# Patient Record
Sex: FEMALE | Race: OTHER | Employment: UNEMPLOYED | URBAN - METROPOLITAN AREA
[De-identification: names, ages, dates, MRNs, and addresses within clinical notes are randomized per-mention and may not be internally consistent; named-entity substitution may affect disease eponyms.]

---

## 2020-10-27 ENCOUNTER — HOSPITAL ENCOUNTER (OUTPATIENT)
Dept: LAB | Age: 40
Discharge: HOME | End: 2020-10-27

## 2020-10-27 LAB
CHOLEST SERPL-MCNC: 185 MG/DL
HDLC SERPL-MCNC: 44 MG/DL
HDLC SERPL: 4.2 {RATIO} (ref 0–5)
LDLC SERPL CALC-MCNC: 112.4 MG/DL (ref 0–100)
LIPID PROFILE,FLP: ABNORMAL
TRIGL SERPL-MCNC: 143 MG/DL (ref ?–150)
TSH SERPL DL<=0.05 MIU/L-ACNC: 0.71 UIU/ML (ref 0.36–3.74)
VLDLC SERPL CALC-MCNC: 28.6 MG/DL

## 2020-10-27 PROCEDURE — 80061 LIPID PANEL: CPT

## 2020-10-27 PROCEDURE — 84443 ASSAY THYROID STIM HORMONE: CPT

## 2021-01-22 ENCOUNTER — HOSPITAL ENCOUNTER (INPATIENT)
Age: 41
LOS: 12 days | Discharge: HOME | DRG: 444 | End: 2021-02-03
Attending: STUDENT IN AN ORGANIZED HEALTH CARE EDUCATION/TRAINING PROGRAM | Admitting: STUDENT IN AN ORGANIZED HEALTH CARE EDUCATION/TRAINING PROGRAM
Payer: SUBSIDIZED

## 2021-01-22 ENCOUNTER — APPOINTMENT (OUTPATIENT)
Dept: ULTRASOUND IMAGING | Age: 41
DRG: 444 | End: 2021-01-22
Attending: STUDENT IN AN ORGANIZED HEALTH CARE EDUCATION/TRAINING PROGRAM

## 2021-01-22 DIAGNOSIS — R10.11 RUQ PAIN: Primary | ICD-10-CM

## 2021-01-22 DIAGNOSIS — K83.1 OBSTRUCTIVE JAUNDICE: ICD-10-CM

## 2021-01-22 LAB
ALBUMIN SERPL-MCNC: 3.4 G/DL (ref 3.5–5)
ALBUMIN/GLOB SERPL: 0.8 {RATIO} (ref 1.1–2.2)
ALP SERPL-CCNC: 878 U/L (ref 45–117)
ALT SERPL-CCNC: 157 U/L (ref 12–78)
ANION GAP SERPL CALC-SCNC: 6 MMOL/L (ref 5–15)
APPEARANCE UR: ABNORMAL
AST SERPL-CCNC: 121 U/L (ref 15–37)
BACTERIA URNS QL MICRO: NEGATIVE /HPF
BASOPHILS # BLD: 0 K/UL (ref 0–0.1)
BASOPHILS NFR BLD: 0 % (ref 0–1)
BILIRUB SERPL-MCNC: 1.6 MG/DL (ref 0.2–1)
BILIRUB UR QL CFM: POSITIVE
BUN SERPL-MCNC: 8 MG/DL (ref 6–20)
BUN/CREAT SERPL: 14 (ref 12–20)
CALCIUM SERPL-MCNC: 8.7 MG/DL (ref 8.5–10.1)
CHLORIDE SERPL-SCNC: 103 MMOL/L (ref 97–108)
CO2 SERPL-SCNC: 29 MMOL/L (ref 21–32)
COLOR UR: ABNORMAL
CREAT SERPL-MCNC: 0.59 MG/DL (ref 0.55–1.02)
DIFFERENTIAL METHOD BLD: ABNORMAL
EOSINOPHIL # BLD: 0.1 K/UL (ref 0–0.4)
EOSINOPHIL NFR BLD: 1 % (ref 0–7)
EPITH CASTS URNS QL MICRO: ABNORMAL /LPF
ERYTHROCYTE [DISTWIDTH] IN BLOOD BY AUTOMATED COUNT: 14.6 % (ref 11.5–14.5)
GLOBULIN SER CALC-MCNC: 4.2 G/DL (ref 2–4)
GLUCOSE SERPL-MCNC: 134 MG/DL (ref 65–100)
GLUCOSE UR STRIP.AUTO-MCNC: NEGATIVE MG/DL
HCG UR QL: NEGATIVE
HCT VFR BLD AUTO: 33.3 % (ref 35–47)
HGB BLD-MCNC: 10.8 G/DL (ref 11.5–16)
HGB UR QL STRIP: ABNORMAL
HYALINE CASTS URNS QL MICRO: ABNORMAL /LPF (ref 0–5)
IMM GRANULOCYTES # BLD AUTO: 0.1 K/UL (ref 0–0.04)
IMM GRANULOCYTES NFR BLD AUTO: 0 % (ref 0–0.5)
KETONES UR QL STRIP.AUTO: 40 MG/DL
LEUKOCYTE ESTERASE UR QL STRIP.AUTO: ABNORMAL
LIPASE SERPL-CCNC: 86 U/L (ref 73–393)
LYMPHOCYTES # BLD: 1.1 K/UL (ref 0.8–3.5)
LYMPHOCYTES NFR BLD: 9 % (ref 12–49)
MCH RBC QN AUTO: 28.2 PG (ref 26–34)
MCHC RBC AUTO-ENTMCNC: 32.4 G/DL (ref 30–36.5)
MCV RBC AUTO: 86.9 FL (ref 80–99)
MONOCYTES # BLD: 0.8 K/UL (ref 0–1)
MONOCYTES NFR BLD: 7 % (ref 5–13)
NEUTS SEG # BLD: 9.7 K/UL (ref 1.8–8)
NEUTS SEG NFR BLD: 83 % (ref 32–75)
NITRITE UR QL STRIP.AUTO: NEGATIVE
NRBC # BLD: 0 K/UL (ref 0–0.01)
NRBC BLD-RTO: 0 PER 100 WBC
PH UR STRIP: 6.5 [PH] (ref 5–8)
PLATELET # BLD AUTO: 284 K/UL (ref 150–400)
PMV BLD AUTO: 12.3 FL (ref 8.9–12.9)
POTASSIUM SERPL-SCNC: 3.7 MMOL/L (ref 3.5–5.1)
PROT SERPL-MCNC: 7.6 G/DL (ref 6.4–8.2)
PROT UR STRIP-MCNC: 30 MG/DL
RBC # BLD AUTO: 3.83 M/UL (ref 3.8–5.2)
RBC #/AREA URNS HPF: >100 /HPF (ref 0–5)
SODIUM SERPL-SCNC: 138 MMOL/L (ref 136–145)
SP GR UR REFRACTOMETRY: 1.02 (ref 1–1.03)
UA: UC IF INDICATED,UAUC: ABNORMAL
UROBILINOGEN UR QL STRIP.AUTO: 2 EU/DL (ref 0.2–1)
WBC # BLD AUTO: 11.8 K/UL (ref 3.6–11)
WBC URNS QL MICRO: ABNORMAL /HPF (ref 0–4)

## 2021-01-22 PROCEDURE — 74011250636 HC RX REV CODE- 250/636: Performed by: STUDENT IN AN ORGANIZED HEALTH CARE EDUCATION/TRAINING PROGRAM

## 2021-01-22 PROCEDURE — 76705 ECHO EXAM OF ABDOMEN: CPT

## 2021-01-22 PROCEDURE — 81001 URINALYSIS AUTO W/SCOPE: CPT

## 2021-01-22 PROCEDURE — 81025 URINE PREGNANCY TEST: CPT

## 2021-01-22 PROCEDURE — 99283 EMERGENCY DEPT VISIT LOW MDM: CPT

## 2021-01-22 PROCEDURE — 36415 COLL VENOUS BLD VENIPUNCTURE: CPT

## 2021-01-22 PROCEDURE — 83690 ASSAY OF LIPASE: CPT

## 2021-01-22 PROCEDURE — 96374 THER/PROPH/DIAG INJ IV PUSH: CPT

## 2021-01-22 PROCEDURE — 96375 TX/PRO/DX INJ NEW DRUG ADDON: CPT

## 2021-01-22 PROCEDURE — 85025 COMPLETE CBC W/AUTO DIFF WBC: CPT

## 2021-01-22 PROCEDURE — 65270000029 HC RM PRIVATE

## 2021-01-22 PROCEDURE — 80053 COMPREHEN METABOLIC PANEL: CPT

## 2021-01-22 PROCEDURE — 87086 URINE CULTURE/COLONY COUNT: CPT

## 2021-01-22 PROCEDURE — 96361 HYDRATE IV INFUSION ADD-ON: CPT

## 2021-01-22 RX ORDER — ONDANSETRON 2 MG/ML
4 INJECTION INTRAMUSCULAR; INTRAVENOUS
Status: DISCONTINUED | OUTPATIENT
Start: 2021-01-22 | End: 2021-02-03 | Stop reason: HOSPADM

## 2021-01-22 RX ORDER — HYDROMORPHONE HYDROCHLORIDE 1 MG/ML
0.2 INJECTION, SOLUTION INTRAMUSCULAR; INTRAVENOUS; SUBCUTANEOUS
Status: DISCONTINUED | OUTPATIENT
Start: 2021-01-23 | End: 2021-01-24

## 2021-01-22 RX ORDER — SODIUM CHLORIDE 0.9 % (FLUSH) 0.9 %
5-40 SYRINGE (ML) INJECTION EVERY 8 HOURS
Status: DISCONTINUED | OUTPATIENT
Start: 2021-01-22 | End: 2021-01-27 | Stop reason: SDUPTHER

## 2021-01-22 RX ORDER — SODIUM CHLORIDE 0.9 % (FLUSH) 0.9 %
5-40 SYRINGE (ML) INJECTION AS NEEDED
Status: DISCONTINUED | OUTPATIENT
Start: 2021-01-22 | End: 2021-02-03 | Stop reason: HOSPADM

## 2021-01-22 RX ORDER — ACETAMINOPHEN 650 MG/1
650 SUPPOSITORY RECTAL
Status: DISCONTINUED | OUTPATIENT
Start: 2021-01-22 | End: 2021-02-03 | Stop reason: HOSPADM

## 2021-01-22 RX ORDER — SODIUM CHLORIDE, SODIUM LACTATE, POTASSIUM CHLORIDE, CALCIUM CHLORIDE 600; 310; 30; 20 MG/100ML; MG/100ML; MG/100ML; MG/100ML
250 INJECTION, SOLUTION INTRAVENOUS CONTINUOUS
Status: DISCONTINUED | OUTPATIENT
Start: 2021-01-22 | End: 2021-01-27

## 2021-01-22 RX ORDER — HYDROMORPHONE HYDROCHLORIDE 1 MG/ML
0.5 INJECTION, SOLUTION INTRAMUSCULAR; INTRAVENOUS; SUBCUTANEOUS EVERY 4 HOURS
Status: DISCONTINUED | OUTPATIENT
Start: 2021-01-22 | End: 2021-01-22

## 2021-01-22 RX ORDER — PROMETHAZINE HYDROCHLORIDE 25 MG/1
12.5 TABLET ORAL
Status: DISCONTINUED | OUTPATIENT
Start: 2021-01-22 | End: 2021-02-03 | Stop reason: HOSPADM

## 2021-01-22 RX ORDER — ACETAMINOPHEN 325 MG/1
650 TABLET ORAL
Status: DISCONTINUED | OUTPATIENT
Start: 2021-01-22 | End: 2021-02-03 | Stop reason: HOSPADM

## 2021-01-22 RX ORDER — ONDANSETRON 2 MG/ML
4 INJECTION INTRAMUSCULAR; INTRAVENOUS
Status: COMPLETED | OUTPATIENT
Start: 2021-01-22 | End: 2021-01-22

## 2021-01-22 RX ORDER — MORPHINE SULFATE 4 MG/ML
4 INJECTION INTRAVENOUS ONCE
Status: COMPLETED | OUTPATIENT
Start: 2021-01-22 | End: 2021-01-22

## 2021-01-22 RX ORDER — POLYETHYLENE GLYCOL 3350 17 G/17G
17 POWDER, FOR SOLUTION ORAL DAILY PRN
Status: DISCONTINUED | OUTPATIENT
Start: 2021-01-22 | End: 2021-02-03 | Stop reason: HOSPADM

## 2021-01-22 RX ADMIN — SODIUM CHLORIDE 1000 ML: 9 INJECTION, SOLUTION INTRAVENOUS at 19:41

## 2021-01-22 RX ADMIN — MORPHINE SULFATE 4 MG: 4 INJECTION INTRAVENOUS at 19:41

## 2021-01-22 RX ADMIN — HYDROMORPHONE HYDROCHLORIDE 0.2 MG: 1 INJECTION, SOLUTION INTRAMUSCULAR; INTRAVENOUS; SUBCUTANEOUS at 23:31

## 2021-01-22 RX ADMIN — Medication 10 ML: at 23:31

## 2021-01-22 RX ADMIN — SODIUM CHLORIDE, POTASSIUM CHLORIDE, SODIUM LACTATE AND CALCIUM CHLORIDE 100 ML/HR: 600; 310; 30; 20 INJECTION, SOLUTION INTRAVENOUS at 23:32

## 2021-01-22 RX ADMIN — ONDANSETRON 4 MG: 2 INJECTION INTRAMUSCULAR; INTRAVENOUS at 19:41

## 2021-01-23 ENCOUNTER — APPOINTMENT (OUTPATIENT)
Dept: MRI IMAGING | Age: 41
DRG: 444 | End: 2021-01-23
Attending: INTERNAL MEDICINE

## 2021-01-23 ENCOUNTER — ANESTHESIA EVENT (OUTPATIENT)
Dept: SURGERY | Age: 41
DRG: 444 | End: 2021-01-23

## 2021-01-23 LAB
BACTERIA SPEC CULT: NORMAL
SERVICE CMNT-IMP: NORMAL

## 2021-01-23 PROCEDURE — 74011250636 HC RX REV CODE- 250/636: Performed by: STUDENT IN AN ORGANIZED HEALTH CARE EDUCATION/TRAINING PROGRAM

## 2021-01-23 PROCEDURE — 74011250636 HC RX REV CODE- 250/636: Performed by: NURSE PRACTITIONER

## 2021-01-23 PROCEDURE — 74011250636 HC RX REV CODE- 250/636: Performed by: INTERNAL MEDICINE

## 2021-01-23 PROCEDURE — 94760 N-INVAS EAR/PLS OXIMETRY 1: CPT

## 2021-01-23 PROCEDURE — 65270000029 HC RM PRIVATE

## 2021-01-23 PROCEDURE — 74011250637 HC RX REV CODE- 250/637: Performed by: INTERNAL MEDICINE

## 2021-01-23 PROCEDURE — 74183 MRI ABD W/O CNTR FLWD CNTR: CPT

## 2021-01-23 PROCEDURE — A9585 GADOBUTROL INJECTION: HCPCS | Performed by: INTERNAL MEDICINE

## 2021-01-23 RX ORDER — DIPHENHYDRAMINE HCL 25 MG
25 CAPSULE ORAL
Status: DISCONTINUED | OUTPATIENT
Start: 2021-01-23 | End: 2021-01-23

## 2021-01-23 RX ORDER — DIPHENHYDRAMINE HYDROCHLORIDE 50 MG/ML
25 INJECTION, SOLUTION INTRAMUSCULAR; INTRAVENOUS ONCE
Status: COMPLETED | OUTPATIENT
Start: 2021-01-23 | End: 2021-01-23

## 2021-01-23 RX ORDER — LEVOFLOXACIN 5 MG/ML
500 INJECTION, SOLUTION INTRAVENOUS EVERY 24 HOURS
Status: DISCONTINUED | OUTPATIENT
Start: 2021-01-23 | End: 2021-01-28

## 2021-01-23 RX ORDER — HYDROXYZINE 25 MG/1
25 TABLET, FILM COATED ORAL
Status: DISCONTINUED | OUTPATIENT
Start: 2021-01-23 | End: 2021-02-03 | Stop reason: HOSPADM

## 2021-01-23 RX ADMIN — Medication 10 ML: at 14:00

## 2021-01-23 RX ADMIN — SODIUM CHLORIDE, POTASSIUM CHLORIDE, SODIUM LACTATE AND CALCIUM CHLORIDE 100 ML/HR: 600; 310; 30; 20 INJECTION, SOLUTION INTRAVENOUS at 08:43

## 2021-01-23 RX ADMIN — HYDROXYZINE HYDROCHLORIDE 25 MG: 25 TABLET, FILM COATED ORAL at 12:41

## 2021-01-23 RX ADMIN — GADOBUTROL 9 ML: 604.72 INJECTION INTRAVENOUS at 12:00

## 2021-01-23 RX ADMIN — DIPHENHYDRAMINE HYDROCHLORIDE 25 MG: 50 INJECTION, SOLUTION INTRAMUSCULAR; INTRAVENOUS at 01:59

## 2021-01-23 RX ADMIN — HYDROMORPHONE HYDROCHLORIDE 0.2 MG: 1 INJECTION, SOLUTION INTRAMUSCULAR; INTRAVENOUS; SUBCUTANEOUS at 05:41

## 2021-01-23 RX ADMIN — HYDROMORPHONE HYDROCHLORIDE 0.2 MG: 1 INJECTION, SOLUTION INTRAMUSCULAR; INTRAVENOUS; SUBCUTANEOUS at 01:59

## 2021-01-23 RX ADMIN — LEVOFLOXACIN 500 MG: 5 INJECTION, SOLUTION INTRAVENOUS at 14:17

## 2021-01-23 NOTE — PROGRESS NOTES
Hospitalist Progress Note    NAME: Evita Gonzáles   :  1980   MRN:  [id number]       Assessment / Plan:  Abdominal pain  Obstructive jaundice  Biliary colic  -We will obtain MRCP today  -Spoke to the GI team and tentatively ERCP scheduled for tomorrow  -Continue IV fluids  -IV analgesia hydromorphone 0.2 mg every 2 hours  -Patient reports itching likely due to hyperbilirubinemia  -We will use hydroxyzine RN  -Patient reports having similar episodes about a year ago  -Appreciated surgical consult        Code Status: Full code  Surrogate Decision Maker:  Farhat Neal     DVT Prophylaxis: SCDs  GI Prophylaxis: not indicated     Baseline: Independent         Subjective:     Chief Complaint / Reason for Physician Visit  \" Patient reports itching, abdominal pain is under control. Denies any nausea or vomiting\". Discussed with RN events overnight. Review of Systems:  Symptom Y/N Comments  Symptom Y/N Comments   Fever/Chills n   Chest Pain n    Poor Appetite n   Edema n    Cough n   Abdominal Pain n    Sputum n   Joint Pain     SOB/COREA n   Pruritis/Rash y/n    Nausea/vomit n   Tolerating PT/OT     Diarrhea n   Tolerating Diet     Constipation n   Other       Could NOT obtain due to:      Objective:     VITALS:   Last 24hrs VS reviewed since prior progress note. Most recent are:  Patient Vitals for the past 24 hrs:   Temp Pulse Resp BP SpO2   21 0801 97.1 °F (36.2 °C) (!) 55 16 (!) 100/53 --   21 0339 98.1 °F (36.7 °C) 75 18 (!) 99/52 98 %   21 0015 98.4 °F (36.9 °C) 83 18 108/60 97 %   21 2231 -- -- -- 114/71 --   21 1913 -- 79 -- (!) 105/56 --   21 1816 98.1 °F (36.7 °C) (!) 58 18 (!) 102/54 98 %       Intake/Output Summary (Last 24 hours) at 2021 1302  Last data filed at 2021 0659  Gross per 24 hour   Intake 1500 ml   Output --   Net 1500 ml        PHYSICAL EXAM:  General: WD, WN. Alert, cooperative, no acute distress    EENT:  EOMI. Anicteric sclerae. MMM  Resp:  CTA bilaterally, no wheezing or rales. No accessory muscle use  CV:  Regular  rhythm,  No edema  GI:  Soft, Non distended, Non tender.  +Bowel sounds  Neurologic:  Alert and oriented X 3, normal speech,   Psych:   Good insight. Not anxious nor agitated  Skin:  No rashes. No jaundice    Reviewed most current lab test results and cultures  YES  Reviewed most current radiology test results   YES  Review and summation of old records today    NO  Reviewed patient's current orders and MAR    YES  PMH/SH reviewed - no change compared to H&P  ________________________________________________________________________  Care Plan discussed with:    Comments   Patient x    Family  x   present at bedside   RN x    Care Manager     Consultant  x  GI                     Multidiciplinary team rounds were held today with , nursing, pharmacist and clinical coordinator. Patient's plan of care was discussed; medications were reviewed and discharge planning was addressed. ________________________________________________________________________  Total NON critical care TIME:  30   Minutes    Total CRITICAL CARE TIME Spent:   Minutes non procedure based      Comments   >50% of visit spent in counseling and coordination of care     ________________________________________________________________________  Maribell Correa MD     Procedures: see electronic medical records for all procedures/Xrays and details which were not copied into this note but were reviewed prior to creation of Plan. LABS:  I reviewed today's most current labs and imaging studies.   Pertinent labs include:  Recent Labs     01/22/21  1821   WBC 11.8*   HGB 10.8*   HCT 33.3*        Recent Labs     01/22/21  1821      K 3.7      CO2 29   *   BUN 8   CREA 0.59   CA 8.7   ALB 3.4*   TBILI 1.6*   *       Signed: Maribell Correa MD

## 2021-01-23 NOTE — PROGRESS NOTES
Pharmacy Automatic Renal Dosing Protocol - Antimicrobials  Indication for Antimicrobials: Intraabdominal infection    Current Regimen of Each Antimicrobial:  Levaquin 500 mg IV Daily (Start Date ; Day # 1)    Previous Antimicrobial Therapy:      Significant Cultures:    Urine = pending    Radiology / Imaging results: (X-ray, CT scan or MRI):   MRCP pending    Paralysis, amputations, malnutrition: NA    Labs:  Recent Labs     21  1821   CREA 0.59   BUN 8   WBC 11.8*     Temp (24hrs), Av.9 °F (36.6 °C), Min:97.1 °F (36.2 °C), Max:98.4 °F (36.9 °C)      Is the Patient on Dialysis? No    Creatinine Clearance (mL/min):   CrCl (Actual Body Weight): 159.9  CrCl (Adjusted Body Weight): 124.0  CrCl (Ideal Body Weight): 100.0    Impression/Plan:   · Continue Levaquin 500 mg IV daily per protocol  · Antimicrobial stop date: TBD     Pharmacy will follow daily and adjust medications as appropriate for renal function and/or serum levels.     Thank you,  Gal Talavera, [facility]

## 2021-01-23 NOTE — ED NOTES
TRANSFER - OUT REPORT:    Verbal report given to JHA Guevara(name) on Lidya Healy  being transferred to [address](unit) for routine progression of care       Report consisted of patients Situation, Background, Assessment and   Recommendations(SBAR). Information from the following report(s) SBAR, Kardex, ED Summary, [facility] and Recent Results was reviewed with the receiving nurse. Lines:   Peripheral IV 01/22/21 Left Antecubital (Active)        Opportunity for questions and clarification was provided.       Patient transported with:   Tech/Transport

## 2021-01-23 NOTE — CONSULTS
Gastroenterology Consult Note  NAME: Roxanne Gutiérrez : 1980 MRN: [id number]   ATTG: [unfilled] PCP: None  Date/Time:  2021 12:58 PM  Subjective:   REASON FOR CONSULT:      Mackenzie Felipe is a 36 y.o.  female who I was asked to see for RUQ with vomiting. She is ordinally from [facility] and had had intermittent RUQ pain x 1 year that she attributed to gastritis. + Fhx of gallstone disease. This  pain got worse and  radiated  to right shoulder, prompting the ER visit. It is worse after eating. She states that she has had this pain or months. She reported emesis x 2. On admission her LFTs were elevated(see below) and on US CBD was found to be prominent. She had an MRCP(Rreviewed w/radiologist: On pre weinberg exam there is a distal CBD stone noted). Surgery was also contacted. US w:    The gallbladder appears unremarkable. The common bile duct is prominent in size at 9 mm. The liver appears unremarkable. Results for Patito Ignacio (MRN [id number]) as of 2021 12:57   Ref. Range 2021 18:21   Bilirubin, total Latest Ref Range: 0.2 - 1.0 MG/DL 1.6 (H)   Protein, total Latest Ref Range: 6.4 - 8.2 g/dL 7.6   Albumin Latest Ref Range: 3.5 - 5.0 g/dL 3.4 (L)   Globulin Latest Ref Range: 2.0 - 4.0 g/dL 4.2 (H)   A-G Ratio Latest Ref Range: 1.1 - 2.2   0.8 (L)   ALT Latest Ref Range: 12 - 78 U/L 157 (H)   AST Latest Ref Range: 15 - 37 U/L 121 (H)   Alk. phosphatase Latest Ref Range: 45 - 117 U/L 878 (H)     No past medical history on file. No past surgical history on file. Social History     Tobacco Use    Smoking status: Never Smoker   Substance Use Topics    Alcohol use: No      No family history on file. Allergies   Allergen Reactions    Pcn [Penicillins] Unknown (comments)      Home Medications:  Prior to Admission Medications   Prescriptions Last Dose Informant Patient Reported?  Taking?   ibuprofen (MOTRIN) 800 mg tablet 2021 at Unknown time  No Yes Sig: Take 1 Tab by mouth every six (6) hours as needed for Pain.       Facility-Administered Medications: None     Hospital medications:  Current Facility-Administered Medications   Medication Dose Route Frequency    hydrOXYzine HCL (ATARAX) tablet 25 mg  25 mg Oral TID PRN    sodium chloride (NS) flush 5-40 mL  5-40 mL IntraVENous Q8H    sodium chloride (NS) flush 5-40 mL  5-40 mL IntraVENous PRN    acetaminophen (TYLENOL) tablet 650 mg  650 mg Oral Q6H PRN    Or    acetaminophen (TYLENOL) suppository 650 mg  650 mg Rectal Q6H PRN    polyethylene glycol (MIRALAX) packet 17 g  17 g Oral DAILY PRN    promethazine (PHENERGAN) tablet 12.5 mg  12.5 mg Oral Q6H PRN    Or    ondansetron (ZOFRAN) injection 4 mg  4 mg IntraVENous Q6H PRN    lactated Ringers infusion  100 mL/hr IntraVENous CONTINUOUS    HYDROmorphone (PF) (DILAUDID) injection 0.2 mg  0.2 mg IntraVENous Q2H     REVIEW OF SYSTEMS:     []     Unable to obtain  ROS due to  []    mental status change  []    sedated   []    intubated   [x]    Total of 11 systems reviewed as follows:  Const:  negative fever, negative chills, negative weight loss  Eyes:   negative diplopia or visual changes, negative eye pain  ENT:   negative coryza, negative sore throat  Resp:   negative cough, hemoptysis, dyspnea  Cards:  negative for chest pain, palpitations, lower extremity edema  :  negative for frequency, dysuria and hematuria  Skin:   negative for rash and pruritus  Heme:  negative for easy bruising and gum/nose bleeding  MS:  negative for myalgias, arthralgias, back pain and muscle weakness  Neurolo:  negative for headaches, dizziness, vertigo, memory problems   Psych:  negative for feelings of anxiety, depression     Pertinent Positives include :    Objective:   VITALS:    Visit Vitals  BP (!) 100/53 (BP 1 Location: Right arm, BP Patient Position: At rest)   Pulse (!) 55   Temp 97.1 °F (36.2 °C)   Resp 16   Ht 5' 6\" (1.676 m)   Wt 94.8 kg (209 lb)   SpO2 98% BMI 33.73 kg/m²     Temp (24hrs), Av.9 °F (36.6 °C), Min:97.1 °F (36.2 °C), Max:98.4 °F (36.9 °C)    PHYSICAL EXAM:     General: No distress;pleasant   Eyes: No icterus; extraocular movements intact,   ENMT: Lips unremarkable. Chest:  breath sounds are normal   Heart: Heart sounds normal, S1,S2  Abdomen: mild RUQ pain on exam bowel sounds present. Neurologic: Alert and oriented   Psyc: Affect is appropriate   Extremities: No edema       LAB DATA REVIEWED:    Recent Results (from the past 48 hour(s))   CBC WITH AUTOMATED DIFF    Collection Time: 21  6:21 PM   Result Value Ref Range    WBC 11.8 (H) 3.6 - 11.0 K/uL    RBC 3.83 3.80 - 5.20 M/uL    HGB 10.8 (L) 11.5 - 16.0 g/dL    HCT 33.3 (L) 35.0 - 47.0 %    MCV 86.9 80.0 - 99.0 FL    MCH 28.2 26.0 - 34.0 PG    MCHC 32.4 30.0 - 36.5 g/dL    RDW 14.6 (H) 11.5 - 14.5 %    PLATELET 642 542 - 874 K/uL    MPV 12.3 8.9 - 12.9 FL    NRBC 0.0 0  WBC    ABSOLUTE NRBC 0.00 0.00 - 0.01 K/uL    NEUTROPHILS 83 (H) 32 - 75 %    LYMPHOCYTES 9 (L) 12 - 49 %    MONOCYTES 7 5 - 13 %    EOSINOPHILS 1 0 - 7 %    BASOPHILS 0 0 - 1 %    IMMATURE GRANULOCYTES 0 0.0 - 0.5 %    ABS. NEUTROPHILS 9.7 (H) 1.8 - 8.0 K/UL    ABS. LYMPHOCYTES 1.1 0.8 - 3.5 K/UL    ABS. MONOCYTES 0.8 0.0 - 1.0 K/UL    ABS. EOSINOPHILS 0.1 0.0 - 0.4 K/UL    ABS. BASOPHILS 0.0 0.0 - 0.1 K/UL    ABS. IMM.  GRANS. 0.1 (H) 0.00 - 0.04 K/UL    DF AUTOMATED     METABOLIC PANEL, COMPREHENSIVE    Collection Time: 21  6:21 PM   Result Value Ref Range    Sodium 138 136 - 145 mmol/L    Potassium 3.7 3.5 - 5.1 mmol/L    Chloride 103 97 - 108 mmol/L    CO2 29 21 - 32 mmol/L    Anion gap 6 5 - 15 mmol/L    Glucose 134 (H) 65 - 100 mg/dL    BUN 8 6 - 20 MG/DL    Creatinine 0.59 0.55 - 1.02 MG/DL    BUN/Creatinine ratio 14 12 - 20      GFR est AA >60 >60 ml/min/1.73m2    GFR est non-AA >60 >60 ml/min/1.73m2    Calcium 8.7 8.5 - 10.1 MG/DL    Bilirubin, total 1.6 (H) 0.2 - 1.0 MG/DL    ALT (SGPT) 157 (H) 12 - 78 U/L    AST (SGOT) 121 (H) 15 - 37 U/L    Alk. phosphatase 878 (H) 45 - 117 U/L    Protein, total 7.6 6.4 - 8.2 g/dL    Albumin 3.4 (L) 3.5 - 5.0 g/dL    Globulin 4.2 (H) 2.0 - 4.0 g/dL    A-G Ratio 0.8 (L) 1.1 - 2.2     LIPASE    Collection Time: 01/22/21  6:21 PM   Result Value Ref Range    Lipase 86 73 - 393 U/L   URINALYSIS W/ REFLEX CULTURE    Collection Time: 01/22/21  7:34 PM    Specimen: Miscellaneous sample; Urine    Urine specimen   Result Value Ref Range    Color DARK YELLOW      Appearance CLOUDY (A) CLEAR      Specific gravity 1.025 1.003 - 1.030      pH (UA) 6.5 5.0 - 8.0      Protein 30 (A) NEG mg/dL    Glucose Negative NEG mg/dL    Ketone 40 (A) NEG mg/dL    Blood LARGE (A) NEG      Urobilinogen 2.0 (H) 0.2 - 1.0 EU/dL    Nitrites Negative NEG      Leukocyte Esterase SMALL (A) NEG      WBC 10-20 0 - 4 /hpf    RBC >100 (H) 0 - 5 /hpf    Epithelial cells FEW FEW /lpf    Bacteria Negative NEG /hpf    UA:UC IF INDICATED URINE CULTURE ORDERED (A) CNI      Hyaline cast 2-5 0 - 5 /lpf   HCG URINE, QL. - POC    Collection Time: 01/22/21  7:34 PM   Result Value Ref Range    Pregnancy test,urine (POC) Negative NEG     BILIRUBIN, CONFIRM    Collection Time: 01/22/21  7:34 PM   Result Value Ref Range    Bilirubin UA, confirm Positive (A) NEG       IMAGING RESULTS:   []      I have personally reviewed the actual   []    CXR  []    CT  []     US    Recommendations/Plan:    Abnormal MRCP  RUQ pain    Active Problems:    Obstructive jaundice (1/22/2021)       ___________________________________________________  RECOMMENDATIONS:      ERCP tomorrow in the OR w/ GA. I discussed with the patient the objectives, risks, consequences and alternatives of ERCP with possible stone removal and/ or stent. The discussion included risks of sedation, heart problem, lung problem, bleeding, injury that might require surgery and pancreatitis. The patient appeared to understand.     1 % bleed/perforation, 7-10 pancreatitis and 5% failure risk d/w pt. She may have clear liquids today. NPO after MN. Pt and her  want to proceed willingly. I also spoke w/ Dr Rhonda Hernandezence before MRCP results were back. She has PCN allergy. Will cover her therefore with IV Levaquin. Thank you for entrusting me with this patient's care. Please do not hesitate to contact me with any questions or if I can be of assistance with this patient or any of your other patients' GI needs. Discussed Code Status:    []    Full Code      [x]    DNR    ___________________________________________________  Care Plan discussed with:    [x]    Patient   [x]    Family   [x]    Nursing   [x]    Attending  Total Time :    minutes   ___________________________________________________  GI: KEHINDE Palacio MD

## 2021-01-23 NOTE — PROGRESS NOTES

## 2021-01-23 NOTE — H&P
.                  Hospitalist Admission Note    NAME: Shameka Basurto   :  1980   MRN:  [id number]     Date/Time:  2021 10:41 PM    Patient PCP: None  ______________________________________________________________________  Given the patient's current clinical presentation, I have a high level of concern for decompensation if discharged from the emergency department. Complex decision making was performed, which includes reviewing the patient's available past medical records, laboratory results, and x-ray films. My assessment of this patient's clinical condition and my plan of care is as follows. Assessment / Plan:  Abdominal pain  Obstructive jaundice  Biliary colic  -We will keep n.p.o. except for ice chips and sips of water  -For gastroenterology evaluation in the morning  -IV fluids  -IV analgesia hydromorphone 0.2 mg every 2 hours          Code Status: Full code  Surrogate Decision Maker:  Farhat Prieto    DVT Prophylaxis: SCDs  GI Prophylaxis: not indicated    Baseline: Independent      Subjective:   CHIEF COMPLAINT: Abdominal pain    HISTORY OF PRESENT ILLNESS:     Shameka Basurto is a 36 y.o.  female who presents with abdominal pain. She had been suffering from what appears to be. Colics for the past year she describes those crisis pain episodes as cramping upper abdominal slightly to the right and with radiation to the right shoulder the pain is associated with nausea and vomiting about 2-3 times per day nonbilious nonbloody. With the pain resolving  Course of the following 2 to 3 days the episodes had become more frequent about every 20 days and more severe and for the last to 3 episodes she has been suffering from nocturnal pruritus that lasts for about 10 to 12 days after each episode. She had been admitted once for similar episode. And had been unable to get health insurance coverage to allow her to have a definite management for it.   Today the pain was so severe that she was about to faint so she came to the hospital.    Work-up in the ED was significant for an ALT of 157, 1 , alkaline phosphatase 878 normal lipase  Ultrasound of the abdomen was done and was showing dilation of the common bile duct at 9 mm. We were asked to admit for work up and evaluation of the above problems. No past medical history on file. No past medical history    No past surgical history on file. No past surgical history    Social History     Tobacco Use    Smoking status: Never Smoker   Substance Use Topics    Alcohol use: No        No family history on file. No family history of heart disease lung disease or cancers  Allergies   Allergen Reactions    Pcn [Penicillins] Unknown (comments)        Prior to Admission medications    Medication Sig Start Date End Date Taking? Authorizing Provider   ibuprofen (MOTRIN) 800 mg tablet Take 1 Tab by mouth every six (6) hours as needed for Pain. 11/11/13   Lucinda Rahman MD       REVIEW OF SYSTEMS:     I am not able to complete the review of systems because:    The patient is intubated and sedated    The patient has altered mental status due to his acute medical problems    The patient has baseline aphasia from prior stroke(s)    The patient has baseline dementia and is not reliable historian    The patient is in acute medical distress and unable to provide information           Total of 12 systems reviewed as follows:       POSITIVE= underlined text  Negative = text not underlined  General:  fever, chills, sweats, generalized weakness, weight loss/gain,      loss of appetite   Eyes:    blurred vision, eye pain, loss of vision, double vision  ENT:    rhinorrhea, pharyngitis   Respiratory:   cough, sputum production, SOB, COREA, wheezing, pleuritic pain   Cardiology:   chest pain, palpitations, orthopnea, PND, edema, syncope   Gastrointestinal:  abdominal pain , N/V, diarrhea, dysphagia, constipation, bleeding Genitourinary:  frequency, urgency, dysuria, hematuria, incontinence   Muskuloskeletal :  arthralgia, myalgia, back pain  Hematology:  easy bruising, nose or gum bleeding, lymphadenopathy   Dermatological: rash, ulceration, pruritis, color change / jaundice  Endocrine:   hot flashes or polydipsia   Neurological:  headache, dizziness, confusion, focal weakness, paresthesia,     Speech difficulties, memory loss, gait difficulty  Psychological: Feelings of anxiety, depression, agitation    Objective:   VITALS:    Visit Vitals  /71   Pulse 79   Temp 98.1 °F (36.7 °C)   Resp 18   Ht 5' 6\" (1.676 m)   Wt 94.8 kg (209 lb)   SpO2 98%   BMI 33.73 kg/m²       PHYSICAL EXAM:    General:    Alert, cooperative, no distress, appears stated age. HEENT: Atraumatic, anicteric sclerae, pink conjunctivae     No oral ulcers, mucosa moist, throat clear, dentition fair  Neck:  Supple, symmetrical,  thyroid: non tender  Lungs:   Clear to auscultation bilaterally. No Wheezing or Rhonchi. No rales. Chest wall:  No tenderness  No Accessory muscle use. Heart:   Regular  rhythm,  No  murmur   No edema  Abdomen:   Soft, tenderness and voluntary guarding over the epigastrium and right hypochondrial area. Bowel sounds normal  Extremities: No cyanosis. No clubbing,      Skin turgor normal, Capillary refill normal, Radial dial pulse 2+  Skin:     Not pale. Not Jaundiced  No rashes   Psych:  Good insight. Not depressed. Not anxious or agitated. Neurologic: EOMs intact. No facial asymmetry. No aphasia or slurred speech. Symmetrical strength, Sensation grossly intact.  Alert and oriented X 4.     _______________________________________________________________________  Care Plan discussed with:    Comments   Patient X    Family  X   at the bedside   RN     Care Manager                    Consultant:      _______________________________________________________________________  Expected  Disposition:   Home with Family X HH/PT/OT/RN    SNF/LTC    JOVANNA    ________________________________________________________________________  TOTAL TIME:  42 Minutes    Critical Care Provided     Minutes non procedure based      Comments    x Reviewed previous records   >50% of visit spent in counseling and coordination of care x Discussion with patient and/or family and questions answered       ________________________________________________________________________  Signed: Daniel Rincon MD    Procedures: see electronic medical records for all procedures/Xrays and details which were not copied into this note but were reviewed prior to creation of Plan. LAB DATA REVIEWED:    Recent Results (from the past 24 hour(s))   CBC WITH AUTOMATED DIFF    Collection Time: 01/22/21  6:21 PM   Result Value Ref Range    WBC 11.8 (H) 3.6 - 11.0 K/uL    RBC 3.83 3.80 - 5.20 M/uL    HGB 10.8 (L) 11.5 - 16.0 g/dL    HCT 33.3 (L) 35.0 - 47.0 %    MCV 86.9 80.0 - 99.0 FL    MCH 28.2 26.0 - 34.0 PG    MCHC 32.4 30.0 - 36.5 g/dL    RDW 14.6 (H) 11.5 - 14.5 %    PLATELET 879 561 - 024 K/uL    MPV 12.3 8.9 - 12.9 FL    NRBC 0.0 0  WBC    ABSOLUTE NRBC 0.00 0.00 - 0.01 K/uL    NEUTROPHILS 83 (H) 32 - 75 %    LYMPHOCYTES 9 (L) 12 - 49 %    MONOCYTES 7 5 - 13 %    EOSINOPHILS 1 0 - 7 %    BASOPHILS 0 0 - 1 %    IMMATURE GRANULOCYTES 0 0.0 - 0.5 %    ABS. NEUTROPHILS 9.7 (H) 1.8 - 8.0 K/UL    ABS. LYMPHOCYTES 1.1 0.8 - 3.5 K/UL    ABS. MONOCYTES 0.8 0.0 - 1.0 K/UL    ABS. EOSINOPHILS 0.1 0.0 - 0.4 K/UL    ABS. BASOPHILS 0.0 0.0 - 0.1 K/UL    ABS. IMM.  GRANS. 0.1 (H) 0.00 - 0.04 K/UL    DF AUTOMATED     METABOLIC PANEL, COMPREHENSIVE    Collection Time: 01/22/21  6:21 PM   Result Value Ref Range    Sodium 138 136 - 145 mmol/L    Potassium 3.7 3.5 - 5.1 mmol/L    Chloride 103 97 - 108 mmol/L    CO2 29 21 - 32 mmol/L    Anion gap 6 5 - 15 mmol/L    Glucose 134 (H) 65 - 100 mg/dL    BUN 8 6 - 20 MG/DL    Creatinine 0.59 0.55 - 1.02 MG/DL    BUN/Creatinine ratio 14 12 - 20      GFR est AA >60 >60 ml/min/1.73m2    GFR est non-AA >60 >60 ml/min/1.73m2    Calcium 8.7 8.5 - 10.1 MG/DL    Bilirubin, total 1.6 (H) 0.2 - 1.0 MG/DL    ALT (SGPT) 157 (H) 12 - 78 U/L    AST (SGOT) 121 (H) 15 - 37 U/L    Alk.  phosphatase 878 (H) 45 - 117 U/L    Protein, total 7.6 6.4 - 8.2 g/dL    Albumin 3.4 (L) 3.5 - 5.0 g/dL    Globulin 4.2 (H) 2.0 - 4.0 g/dL    A-G Ratio 0.8 (L) 1.1 - 2.2     LIPASE    Collection Time: 01/22/21  6:21 PM   Result Value Ref Range    Lipase 86 73 - 393 U/L   URINALYSIS W/ REFLEX CULTURE    Collection Time: 01/22/21  7:34 PM    Specimen: Miscellaneous sample; Urine    Urine specimen   Result Value Ref Range    Color DARK YELLOW      Appearance CLOUDY (A) CLEAR      Specific gravity 1.025 1.003 - 1.030      pH (UA) 6.5 5.0 - 8.0      Protein 30 (A) NEG mg/dL    Glucose Negative NEG mg/dL    Ketone 40 (A) NEG mg/dL    Blood LARGE (A) NEG      Urobilinogen 2.0 (H) 0.2 - 1.0 EU/dL    Nitrites Negative NEG      Leukocyte Esterase SMALL (A) NEG      WBC 10-20 0 - 4 /hpf    RBC >100 (H) 0 - 5 /hpf    Epithelial cells FEW FEW /lpf    Bacteria Negative NEG /hpf    UA:UC IF INDICATED URINE CULTURE ORDERED (A) CNI      Hyaline cast 2-5 0 - 5 /lpf   HCG URINE, QL. - POC    Collection Time: 01/22/21  7:34 PM   Result Value Ref Range    Pregnancy test,urine (POC) Negative NEG     BILIRUBIN, CONFIRM    Collection Time: 01/22/21  7:34 PM   Result Value Ref Range    Bilirubin UA, confirm Positive (A) NEG

## 2021-01-23 NOTE — PROGRESS NOTES
Received notification from bedside RN about patient with regards to: itching in hands and feet, recurrent condition that patient takes Benadryl for PTA, currently NPO  VS: BP 1008/60, HR 83, RR 18, O2 sat 97%    Intervention given: Benadryl IV x 1 dose ordered

## 2021-01-23 NOTE — ED PROVIDER NOTES
EMERGENCY DEPARTMENT HISTORY AND PHYSICAL EXAM      Date: 1/22/2021  Patient Name: Vishal Chung    History of Presenting Illness     Chief Complaint   Patient presents with    Abdominal Pain     Pt wheeled to triage with c/o RUQ pain x 1400 today; hx of gallstones; nausea, vomiting         HPI: Vishal Chung, 36 y.o. female presents to the ED with cc of abdominal pain. This got worse over the past day, she reports right upper quadrant abdominal pain that radiates to her right shoulder. It got much worse after eating. She states that she has had this pain in months past, however attributed to gastritis. She reports 1 episode of nonbloody emesis, no diarrhea, no fevers. No dysuria or hematuria. She is currently on her menstrual cycle. She reports history of tubal ligation, no prior abdominal surgeries. There are no other complaints, changes, or physical findings at this time. PCP: None    No current facility-administered medications on file prior to encounter. Current Outpatient Medications on File Prior to Encounter   Medication Sig Dispense Refill    ibuprofen (MOTRIN) 800 mg tablet Take 1 Tab by mouth every six (6) hours as needed for Pain. 20 Tab 0       Past History     Past Medical History:  No past medical history on file. Past Surgical History:  No past surgical history on file. Family History:  No family history on file. Social History:  Social History     Tobacco Use    Smoking status: Never Smoker   Substance Use Topics    Alcohol use: No    Drug use: No       Allergies:   Allergies   Allergen Reactions    Pcn [Penicillins] Unknown (comments)         Review of Systems   no fever  No eye pain  No ear pain  no shortness of breath  no chest pain  Reports abdominal pain  no dysuria  no leg pain  No rash  No lymphadenopathy  No weight loss    Physical Exam   Physical Exam  Constitutional:       Comments: Uncomfortable appearing   HENT:      Head: Normocephalic and atraumatic. Mouth/Throat:      Mouth: Mucous membranes are moist.   Eyes:      Extraocular Movements: Extraocular movements intact. Cardiovascular:      Rate and Rhythm: Normal rate and regular rhythm. Abdominal:      General: Abdomen is flat. Palpations: Abdomen is soft. Comments: Tender to palpation in the right upper quadrant   Skin:     General: Skin is warm and dry. Neurological:      General: No focal deficit present. Mental Status: She is alert and oriented to person, place, and time. Psychiatric:         Mood and Affect: Mood normal.         Diagnostic Study Results     Labs -     Recent Results (from the past 24 hour(s))   CBC WITH AUTOMATED DIFF    Collection Time: 01/22/21  6:21 PM   Result Value Ref Range    WBC 11.8 (H) 3.6 - 11.0 K/uL    RBC 3.83 3.80 - 5.20 M/uL    HGB 10.8 (L) 11.5 - 16.0 g/dL    HCT 33.3 (L) 35.0 - 47.0 %    MCV 86.9 80.0 - 99.0 FL    MCH 28.2 26.0 - 34.0 PG    MCHC 32.4 30.0 - 36.5 g/dL    RDW 14.6 (H) 11.5 - 14.5 %    PLATELET 304 749 - 782 K/uL    MPV 12.3 8.9 - 12.9 FL    NRBC 0.0 0  WBC    ABSOLUTE NRBC 0.00 0.00 - 0.01 K/uL    NEUTROPHILS 83 (H) 32 - 75 %    LYMPHOCYTES 9 (L) 12 - 49 %    MONOCYTES 7 5 - 13 %    EOSINOPHILS 1 0 - 7 %    BASOPHILS 0 0 - 1 %    IMMATURE GRANULOCYTES 0 0.0 - 0.5 %    ABS. NEUTROPHILS 9.7 (H) 1.8 - 8.0 K/UL    ABS. LYMPHOCYTES 1.1 0.8 - 3.5 K/UL    ABS. MONOCYTES 0.8 0.0 - 1.0 K/UL    ABS. EOSINOPHILS 0.1 0.0 - 0.4 K/UL    ABS. BASOPHILS 0.0 0.0 - 0.1 K/UL    ABS. IMM.  GRANS. 0.1 (H) 0.00 - 0.04 K/UL    DF AUTOMATED     METABOLIC PANEL, COMPREHENSIVE    Collection Time: 01/22/21  6:21 PM   Result Value Ref Range    Sodium 138 136 - 145 mmol/L    Potassium 3.7 3.5 - 5.1 mmol/L    Chloride 103 97 - 108 mmol/L    CO2 29 21 - 32 mmol/L    Anion gap 6 5 - 15 mmol/L    Glucose 134 (H) 65 - 100 mg/dL    BUN 8 6 - 20 MG/DL    Creatinine 0.59 0.55 - 1.02 MG/DL    BUN/Creatinine ratio 14 12 - 20      GFR est AA >60 >60 ml/min/1.73m2    GFR est non-AA >60 >60 ml/min/1.73m2    Calcium 8.7 8.5 - 10.1 MG/DL    Bilirubin, total 1.6 (H) 0.2 - 1.0 MG/DL    ALT (SGPT) 157 (H) 12 - 78 U/L    AST (SGOT) 121 (H) 15 - 37 U/L    Alk. phosphatase 878 (H) 45 - 117 U/L    Protein, total 7.6 6.4 - 8.2 g/dL    Albumin 3.4 (L) 3.5 - 5.0 g/dL    Globulin 4.2 (H) 2.0 - 4.0 g/dL    A-G Ratio 0.8 (L) 1.1 - 2.2     LIPASE    Collection Time: 01/22/21  6:21 PM   Result Value Ref Range    Lipase 86 73 - 393 U/L   URINALYSIS W/ REFLEX CULTURE    Collection Time: 01/22/21  7:34 PM    Specimen: Miscellaneous sample; Urine    Urine specimen   Result Value Ref Range    Color DARK YELLOW      Appearance CLOUDY (A) CLEAR      Specific gravity 1.025 1.003 - 1.030      pH (UA) 6.5 5.0 - 8.0      Protein 30 (A) NEG mg/dL    Glucose Negative NEG mg/dL    Ketone 40 (A) NEG mg/dL    Blood LARGE (A) NEG      Urobilinogen 2.0 (H) 0.2 - 1.0 EU/dL    Nitrites Negative NEG      Leukocyte Esterase SMALL (A) NEG      WBC 10-20 0 - 4 /hpf    RBC >100 (H) 0 - 5 /hpf    Epithelial cells FEW FEW /lpf    Bacteria Negative NEG /hpf    UA:UC IF INDICATED URINE CULTURE ORDERED (A) CNI      Hyaline cast 2-5 0 - 5 /lpf   HCG URINE, QL. - POC    Collection Time: 01/22/21  7:34 PM   Result Value Ref Range    Pregnancy test,urine (POC) Negative NEG     BILIRUBIN, CONFIRM    Collection Time: 01/22/21  7:34 PM   Result Value Ref Range    Bilirubin UA, confirm Positive (A) NEG         Radiologic Studies -   US ABD LTD   Final Result   Prominent size common bile duct, no definite etiology seen by this   technique. CT Results  (Last 48 hours)    None        CXR Results  (Last 48 hours)    None            Medical Decision Making   I am the first provider for this patient. I reviewed the vital signs, available nursing notes, past medical history, past surgical history, family history and social history. Vital Signs-Reviewed the patient's vital signs.   Patient Vitals for the past 24 hrs:   Temp Pulse Resp BP SpO2   01/22/21 1913 -- 79 -- (!) 105/56 --   01/22/21 1816 98.1 °F (36.7 °C) (!) 58 18 (!) 102/54 98 %         Provider Notes (Medical Decision Making):   59-year-old female presenting with abdominal pain. Symptoms are concerning for cholelithiasis, cholecystitis, possible pancreatitis, GERD, gastritis. She is given IV fluids, Zofran and morphine IV. ED Course:     Initial assessment performed. The patients presenting problems have been discussed, and they are in agreement with the care plan formulated and outlined with them. I have encouraged them to ask questions as they arise throughout their visit. Pregnancy test is negative, CBC shows leukocytosis 11.8, anemia with hemoglobin of 10.8.  UA is not suggestive of UTI. Basic metabolic panel shows normal renal function, transaminitis with AST/ALT of 121/157, bilirubin is elevated 1.6. Right upper quadrant ultrasound shows elevated common bile duct at 9 mm. On reevaluation, the patient is resting comfortably, stable vital signs, continues to complain of right upper quadrant abdominal pain. She will be admitted to medicine for further work-up. Critical Care Time:         Disposition:  Admit    PLAN:  1. Current Discharge Medication List        2.    Follow-up Information    None       Return to ED if worse     Diagnosis     Clinical Impression: Acute right upper quadrant abdominal pain with transaminitis and hyperbilirubinemia

## 2021-01-24 ENCOUNTER — APPOINTMENT (OUTPATIENT)
Dept: GENERAL RADIOLOGY | Age: 41
DRG: 444 | End: 2021-01-24
Attending: INTERNAL MEDICINE

## 2021-01-24 ENCOUNTER — ANESTHESIA (OUTPATIENT)
Dept: SURGERY | Age: 41
DRG: 444 | End: 2021-01-24

## 2021-01-24 ENCOUNTER — APPOINTMENT (OUTPATIENT)
Dept: CT IMAGING | Age: 41
DRG: 444 | End: 2021-01-24
Attending: INTERNAL MEDICINE

## 2021-01-24 LAB
ALBUMIN SERPL-MCNC: 2.7 G/DL (ref 3.5–5)
ALBUMIN/GLOB SERPL: 0.8 {RATIO} (ref 1.1–2.2)
ALP SERPL-CCNC: 692 U/L (ref 45–117)
ALT SERPL-CCNC: 123 U/L (ref 12–78)
AMYLASE SERPL-CCNC: >1300 U/L (ref 25–115)
ANION GAP SERPL CALC-SCNC: 5 MMOL/L (ref 5–15)
AST SERPL-CCNC: 88 U/L (ref 15–37)
BILIRUB SERPL-MCNC: 2.9 MG/DL (ref 0.2–1)
BUN SERPL-MCNC: 5 MG/DL (ref 6–20)
BUN/CREAT SERPL: 10 (ref 12–20)
CALCIUM SERPL-MCNC: 8.7 MG/DL (ref 8.5–10.1)
CHLORIDE SERPL-SCNC: 107 MMOL/L (ref 97–108)
CO2 SERPL-SCNC: 28 MMOL/L (ref 21–32)
CREAT SERPL-MCNC: 0.5 MG/DL (ref 0.55–1.02)
ERYTHROCYTE [DISTWIDTH] IN BLOOD BY AUTOMATED COUNT: 14.7 % (ref 11.5–14.5)
GLOBULIN SER CALC-MCNC: 3.6 G/DL (ref 2–4)
GLUCOSE SERPL-MCNC: 81 MG/DL (ref 65–100)
HCT VFR BLD AUTO: 30 % (ref 35–47)
HGB BLD-MCNC: 9.5 G/DL (ref 11.5–16)
LIPASE SERPL-CCNC: >3000 U/L (ref 73–393)
MCH RBC QN AUTO: 27.6 PG (ref 26–34)
MCHC RBC AUTO-ENTMCNC: 31.7 G/DL (ref 30–36.5)
MCV RBC AUTO: 87.2 FL (ref 80–99)
NRBC # BLD: 0 K/UL (ref 0–0.01)
NRBC BLD-RTO: 0 PER 100 WBC
PLATELET # BLD AUTO: 235 K/UL (ref 150–400)
PMV BLD AUTO: 12.1 FL (ref 8.9–12.9)
POTASSIUM SERPL-SCNC: 3.7 MMOL/L (ref 3.5–5.1)
PROT SERPL-MCNC: 6.3 G/DL (ref 6.4–8.2)
RBC # BLD AUTO: 3.44 M/UL (ref 3.8–5.2)
SODIUM SERPL-SCNC: 140 MMOL/L (ref 136–145)
WBC # BLD AUTO: 3.8 K/UL (ref 3.6–11)

## 2021-01-24 PROCEDURE — 74011250636 HC RX REV CODE- 250/636: Performed by: STUDENT IN AN ORGANIZED HEALTH CARE EDUCATION/TRAINING PROGRAM

## 2021-01-24 PROCEDURE — 2709999900 HC NON-CHARGEABLE SUPPLY

## 2021-01-24 PROCEDURE — 74011250636 HC RX REV CODE- 250/636: Performed by: NURSE ANESTHETIST, CERTIFIED REGISTERED

## 2021-01-24 PROCEDURE — 76010000138 HC OR TIME 0.5 TO 1 HR: Performed by: INTERNAL MEDICINE

## 2021-01-24 PROCEDURE — 74011000250 HC RX REV CODE- 250: Performed by: INTERNAL MEDICINE

## 2021-01-24 PROCEDURE — 74330 X-RAY BILE/PANC ENDOSCOPY: CPT

## 2021-01-24 PROCEDURE — 80053 COMPREHEN METABOLIC PANEL: CPT

## 2021-01-24 PROCEDURE — 74011250636 HC RX REV CODE- 250/636: Performed by: INTERNAL MEDICINE

## 2021-01-24 PROCEDURE — 74011000250 HC RX REV CODE- 250: Performed by: NURSE ANESTHETIST, CERTIFIED REGISTERED

## 2021-01-24 PROCEDURE — BF111ZZ FLUOROSCOPY OF BILIARY AND PANCREATIC DUCTS USING LOW OSMOLAR CONTRAST: ICD-10-PCS | Performed by: INTERNAL MEDICINE

## 2021-01-24 PROCEDURE — 74011250637 HC RX REV CODE- 250/637: Performed by: STUDENT IN AN ORGANIZED HEALTH CARE EDUCATION/TRAINING PROGRAM

## 2021-01-24 PROCEDURE — 77030008684 HC TU ET CUF COVD -B: Performed by: ANESTHESIOLOGY

## 2021-01-24 PROCEDURE — 36415 COLL VENOUS BLD VENIPUNCTURE: CPT

## 2021-01-24 PROCEDURE — 2709999900 HC NON-CHARGEABLE SUPPLY: Performed by: INTERNAL MEDICINE

## 2021-01-24 PROCEDURE — 77030040361 HC SLV COMPR DVT MDII -B: Performed by: INTERNAL MEDICINE

## 2021-01-24 PROCEDURE — 74011000636 HC RX REV CODE- 636: Performed by: INTERNAL MEDICINE

## 2021-01-24 PROCEDURE — 77030019908 HC STETH ESOPH SIMS -A: Performed by: ANESTHESIOLOGY

## 2021-01-24 PROCEDURE — C1726 CATH, BAL DIL, NON-VASCULAR: HCPCS | Performed by: INTERNAL MEDICINE

## 2021-01-24 PROCEDURE — 99252 IP/OBS CONSLTJ NEW/EST SF 35: CPT | Performed by: SURGERY

## 2021-01-24 PROCEDURE — 77030009038 HC CATH BILI STN RTVR BSC -C: Performed by: INTERNAL MEDICINE

## 2021-01-24 PROCEDURE — 0FC98ZZ EXTIRPATION OF MATTER FROM COMMON BILE DUCT, VIA NATURAL OR ARTIFICIAL OPENING ENDOSCOPIC: ICD-10-PCS | Performed by: INTERNAL MEDICINE

## 2021-01-24 PROCEDURE — 77030010603 HC BLN DEV INFL BSC -B: Performed by: INTERNAL MEDICINE

## 2021-01-24 PROCEDURE — 74011250637 HC RX REV CODE- 250/637: Performed by: INTERNAL MEDICINE

## 2021-01-24 PROCEDURE — 82150 ASSAY OF AMYLASE: CPT

## 2021-01-24 PROCEDURE — 76060000032 HC ANESTHESIA 0.5 TO 1 HR: Performed by: INTERNAL MEDICINE

## 2021-01-24 PROCEDURE — 77030010104 HC SEAL PRT ENDOSC BYRN -B: Performed by: INTERNAL MEDICINE

## 2021-01-24 PROCEDURE — 65270000029 HC RM PRIVATE

## 2021-01-24 PROCEDURE — 85027 COMPLETE CBC AUTOMATED: CPT

## 2021-01-24 PROCEDURE — 77030040922 HC BLNKT HYPOTHRM STRY -A

## 2021-01-24 PROCEDURE — 74177 CT ABD & PELVIS W/CONTRAST: CPT

## 2021-01-24 PROCEDURE — 83690 ASSAY OF LIPASE: CPT

## 2021-01-24 PROCEDURE — 76210000063 HC OR PH I REC FIRST 0.5 HR: Performed by: INTERNAL MEDICINE

## 2021-01-24 RX ORDER — DEXAMETHASONE SODIUM PHOSPHATE 4 MG/ML
INJECTION, SOLUTION INTRA-ARTICULAR; INTRALESIONAL; INTRAMUSCULAR; INTRAVENOUS; SOFT TISSUE AS NEEDED
Status: DISCONTINUED | OUTPATIENT
Start: 2021-01-24 | End: 2021-01-24 | Stop reason: HOSPADM

## 2021-01-24 RX ORDER — FENTANYL CITRATE 50 UG/ML
INJECTION, SOLUTION INTRAMUSCULAR; INTRAVENOUS AS NEEDED
Status: DISCONTINUED | OUTPATIENT
Start: 2021-01-24 | End: 2021-01-24 | Stop reason: HOSPADM

## 2021-01-24 RX ORDER — NALOXONE HYDROCHLORIDE 0.4 MG/ML
0.4 INJECTION, SOLUTION INTRAMUSCULAR; INTRAVENOUS; SUBCUTANEOUS
Status: DISCONTINUED | OUTPATIENT
Start: 2021-01-24 | End: 2021-01-24 | Stop reason: ALTCHOICE

## 2021-01-24 RX ORDER — MIDAZOLAM HYDROCHLORIDE 1 MG/ML
.25-5 INJECTION, SOLUTION INTRAMUSCULAR; INTRAVENOUS
Status: DISCONTINUED | OUTPATIENT
Start: 2021-01-24 | End: 2021-01-24 | Stop reason: ALTCHOICE

## 2021-01-24 RX ORDER — SUCCINYLCHOLINE CHLORIDE 20 MG/ML
INJECTION INTRAMUSCULAR; INTRAVENOUS AS NEEDED
Status: DISCONTINUED | OUTPATIENT
Start: 2021-01-24 | End: 2021-01-24 | Stop reason: HOSPADM

## 2021-01-24 RX ORDER — DEXTROMETHORPHAN/PSEUDOEPHED 2.5-7.5/.8
1.2 DROPS ORAL
Status: DISCONTINUED | OUTPATIENT
Start: 2021-01-24 | End: 2021-01-24 | Stop reason: ALTCHOICE

## 2021-01-24 RX ORDER — ACETAMINOPHEN 500 MG
1 TABLET ORAL DAILY
COMMUNITY

## 2021-01-24 RX ORDER — ONDANSETRON 2 MG/ML
INJECTION INTRAMUSCULAR; INTRAVENOUS AS NEEDED
Status: DISCONTINUED | OUTPATIENT
Start: 2021-01-24 | End: 2021-01-24 | Stop reason: HOSPADM

## 2021-01-24 RX ORDER — SODIUM CHLORIDE, SODIUM LACTATE, POTASSIUM CHLORIDE, CALCIUM CHLORIDE 600; 310; 30; 20 MG/100ML; MG/100ML; MG/100ML; MG/100ML
125 INJECTION, SOLUTION INTRAVENOUS CONTINUOUS
Status: DISCONTINUED | OUTPATIENT
Start: 2021-01-24 | End: 2021-01-28

## 2021-01-24 RX ORDER — SODIUM CHLORIDE 9 MG/ML
75 INJECTION, SOLUTION INTRAVENOUS CONTINUOUS
Status: DISPENSED | OUTPATIENT
Start: 2021-01-24 | End: 2021-01-24

## 2021-01-24 RX ORDER — LIDOCAINE HYDROCHLORIDE 20 MG/ML
INJECTION, SOLUTION EPIDURAL; INFILTRATION; INTRACAUDAL; PERINEURAL AS NEEDED
Status: DISCONTINUED | OUTPATIENT
Start: 2021-01-24 | End: 2021-01-24 | Stop reason: HOSPADM

## 2021-01-24 RX ORDER — MIDAZOLAM HYDROCHLORIDE 1 MG/ML
INJECTION, SOLUTION INTRAMUSCULAR; INTRAVENOUS AS NEEDED
Status: DISCONTINUED | OUTPATIENT
Start: 2021-01-24 | End: 2021-01-24 | Stop reason: HOSPADM

## 2021-01-24 RX ORDER — EPINEPHRINE 0.1 MG/ML
1 INJECTION INTRACARDIAC; INTRAVENOUS
Status: DISCONTINUED | OUTPATIENT
Start: 2021-01-24 | End: 2021-01-24 | Stop reason: ALTCHOICE

## 2021-01-24 RX ORDER — HYDROMORPHONE HYDROCHLORIDE 1 MG/ML
1 INJECTION, SOLUTION INTRAMUSCULAR; INTRAVENOUS; SUBCUTANEOUS ONCE
Status: COMPLETED | OUTPATIENT
Start: 2021-01-24 | End: 2021-01-24

## 2021-01-24 RX ORDER — SODIUM CHLORIDE 0.9 % (FLUSH) 0.9 %
5-40 SYRINGE (ML) INJECTION EVERY 8 HOURS
Status: DISCONTINUED | OUTPATIENT
Start: 2021-01-24 | End: 2021-01-27 | Stop reason: SDUPTHER

## 2021-01-24 RX ORDER — FENTANYL CITRATE 50 UG/ML
25 INJECTION, SOLUTION INTRAMUSCULAR; INTRAVENOUS
Status: DISCONTINUED | OUTPATIENT
Start: 2021-01-24 | End: 2021-01-24 | Stop reason: ALTCHOICE

## 2021-01-24 RX ORDER — ROCURONIUM BROMIDE 10 MG/ML
INJECTION, SOLUTION INTRAVENOUS AS NEEDED
Status: DISCONTINUED | OUTPATIENT
Start: 2021-01-24 | End: 2021-01-24 | Stop reason: HOSPADM

## 2021-01-24 RX ORDER — HYDROMORPHONE HYDROCHLORIDE 1 MG/ML
1 INJECTION, SOLUTION INTRAMUSCULAR; INTRAVENOUS; SUBCUTANEOUS
Status: DISCONTINUED | OUTPATIENT
Start: 2021-01-24 | End: 2021-01-25

## 2021-01-24 RX ORDER — SODIUM CHLORIDE 0.9 % (FLUSH) 0.9 %
5-40 SYRINGE (ML) INJECTION AS NEEDED
Status: DISCONTINUED | OUTPATIENT
Start: 2021-01-24 | End: 2021-01-26 | Stop reason: SDUPTHER

## 2021-01-24 RX ORDER — HYDROMORPHONE HYDROCHLORIDE 1 MG/ML
0.2 INJECTION, SOLUTION INTRAMUSCULAR; INTRAVENOUS; SUBCUTANEOUS
Status: DISCONTINUED | OUTPATIENT
Start: 2021-01-24 | End: 2021-01-24 | Stop reason: ALTCHOICE

## 2021-01-24 RX ORDER — PROPOFOL 10 MG/ML
INJECTION, EMULSION INTRAVENOUS AS NEEDED
Status: DISCONTINUED | OUTPATIENT
Start: 2021-01-24 | End: 2021-01-24 | Stop reason: HOSPADM

## 2021-01-24 RX ORDER — FENTANYL CITRATE 50 UG/ML
12.5 INJECTION, SOLUTION INTRAMUSCULAR; INTRAVENOUS
Status: DISCONTINUED | OUTPATIENT
Start: 2021-01-24 | End: 2021-02-03 | Stop reason: HOSPADM

## 2021-01-24 RX ORDER — LIDOCAINE HYDROCHLORIDE 10 MG/ML
0.1 INJECTION, SOLUTION EPIDURAL; INFILTRATION; INTRACAUDAL; PERINEURAL AS NEEDED
Status: DISCONTINUED | OUTPATIENT
Start: 2021-01-24 | End: 2021-01-24 | Stop reason: ALTCHOICE

## 2021-01-24 RX ORDER — DIPHENHYDRAMINE HYDROCHLORIDE 50 MG/ML
12.5 INJECTION, SOLUTION INTRAMUSCULAR; INTRAVENOUS AS NEEDED
Status: DISCONTINUED | OUTPATIENT
Start: 2021-01-24 | End: 2021-01-24 | Stop reason: ALTCHOICE

## 2021-01-24 RX ORDER — SODIUM CHLORIDE 0.9 % (FLUSH) 0.9 %
5-40 SYRINGE (ML) INJECTION EVERY 8 HOURS
Status: DISCONTINUED | OUTPATIENT
Start: 2021-01-24 | End: 2021-02-03 | Stop reason: HOSPADM

## 2021-01-24 RX ORDER — FLUMAZENIL 0.1 MG/ML
0.2 INJECTION INTRAVENOUS
Status: DISCONTINUED | OUTPATIENT
Start: 2021-01-24 | End: 2021-01-24 | Stop reason: ALTCHOICE

## 2021-01-24 RX ORDER — ATROPINE SULFATE 0.1 MG/ML
0.5 INJECTION INTRAVENOUS
Status: DISCONTINUED | OUTPATIENT
Start: 2021-01-24 | End: 2021-01-24 | Stop reason: ALTCHOICE

## 2021-01-24 RX ORDER — PHENOL/SODIUM PHENOLATE
20 AEROSOL, SPRAY (ML) MUCOUS MEMBRANE DAILY
COMMUNITY
End: 2021-06-11 | Stop reason: CLARIF

## 2021-01-24 RX ORDER — ACETAMINOPHEN 325 MG/1
650 TABLET ORAL
COMMUNITY
End: 2021-06-16

## 2021-01-24 RX ADMIN — SODIUM CHLORIDE, POTASSIUM CHLORIDE, SODIUM LACTATE AND CALCIUM CHLORIDE 250 ML/HR: 600; 310; 30; 20 INJECTION, SOLUTION INTRAVENOUS at 19:49

## 2021-01-24 RX ADMIN — HYDROMORPHONE HYDROCHLORIDE 1 MG: 1 INJECTION, SOLUTION INTRAMUSCULAR; INTRAVENOUS; SUBCUTANEOUS at 20:03

## 2021-01-24 RX ADMIN — LIDOCAINE HYDROCHLORIDE 100 MG: 20 INJECTION, SOLUTION INTRAVENOUS at 11:58

## 2021-01-24 RX ADMIN — SODIUM CHLORIDE, POTASSIUM CHLORIDE, SODIUM LACTATE AND CALCIUM CHLORIDE 150 ML/HR: 600; 310; 30; 20 INJECTION, SOLUTION INTRAVENOUS at 13:39

## 2021-01-24 RX ADMIN — FENTANYL CITRATE 100 MCG: 50 INJECTION, SOLUTION INTRAMUSCULAR; INTRAVENOUS at 11:58

## 2021-01-24 RX ADMIN — DEXAMETHASONE SODIUM PHOSPHATE 8 MG: 4 INJECTION, SOLUTION INTRAMUSCULAR; INTRAVENOUS at 12:08

## 2021-01-24 RX ADMIN — HYDROMORPHONE HYDROCHLORIDE 1 MG: 1 INJECTION, SOLUTION INTRAMUSCULAR; INTRAVENOUS; SUBCUTANEOUS at 16:46

## 2021-01-24 RX ADMIN — MIDAZOLAM HYDROCHLORIDE 2 MG: 1 INJECTION, SOLUTION INTRAMUSCULAR; INTRAVENOUS at 11:51

## 2021-01-24 RX ADMIN — HYDROMORPHONE HYDROCHLORIDE 0.2 MG: 1 INJECTION, SOLUTION INTRAMUSCULAR; INTRAVENOUS; SUBCUTANEOUS at 16:10

## 2021-01-24 RX ADMIN — HYDROMORPHONE HYDROCHLORIDE 1 MG: 1 INJECTION, SOLUTION INTRAMUSCULAR; INTRAVENOUS; SUBCUTANEOUS at 22:49

## 2021-01-24 RX ADMIN — ACETAMINOPHEN 650 MG: 325 TABLET ORAL at 13:44

## 2021-01-24 RX ADMIN — FAMOTIDINE 20 MG: 10 INJECTION INTRAVENOUS at 20:03

## 2021-01-24 RX ADMIN — PROPOFOL 150 MG: 10 INJECTION, EMULSION INTRAVENOUS at 11:58

## 2021-01-24 RX ADMIN — ROCURONIUM BROMIDE 5 MG: 10 INJECTION INTRAVENOUS at 11:58

## 2021-01-24 RX ADMIN — SODIUM CHLORIDE, POTASSIUM CHLORIDE, SODIUM LACTATE AND CALCIUM CHLORIDE 1000 ML: 600; 310; 30; 20 INJECTION, SOLUTION INTRAVENOUS at 18:52

## 2021-01-24 RX ADMIN — IOPAMIDOL 100 ML: 755 INJECTION, SOLUTION INTRAVENOUS at 17:32

## 2021-01-24 RX ADMIN — ONDANSETRON HYDROCHLORIDE 4 MG: 2 INJECTION, SOLUTION INTRAMUSCULAR; INTRAVENOUS at 12:08

## 2021-01-24 RX ADMIN — SUCCINYLCHOLINE CHLORIDE 140 MG: 20 INJECTION, SOLUTION INTRAMUSCULAR; INTRAVENOUS at 11:58

## 2021-01-24 RX ADMIN — LEVOFLOXACIN 500 MG: 5 INJECTION, SOLUTION INTRAVENOUS at 13:48

## 2021-01-24 RX ADMIN — INDOMETHACIN 50 MG: 50 SUPPOSITORY RECTAL at 18:53

## 2021-01-24 RX ADMIN — Medication 10 ML: at 16:10

## 2021-01-24 RX ADMIN — Medication 10 ML: at 13:49

## 2021-01-24 RX ADMIN — SODIUM CHLORIDE, POTASSIUM CHLORIDE, SODIUM LACTATE AND CALCIUM CHLORIDE 500 ML: 600; 310; 30; 20 INJECTION, SOLUTION INTRAVENOUS at 16:25

## 2021-01-24 RX ADMIN — Medication 10 ML: at 23:00

## 2021-01-24 NOTE — PERIOP NOTES
TRANSFER - IN REPORT:    Verbal report received from JAH SANTANA(name) on Erlin Ready  being received from G/S (unit) for ordered procedure      Report consisted of patients Situation, Background, Assessment and   Recommendations(SBAR). Information from the following report(s) SBAR, Kardex, OR Summary, Procedure Summary, Intake/Output, MAR, Recent Results, Med Rec Status and Cardiac Rhythm NSR was reviewed with the receiving nurse. Opportunity for questions and clarification was provided. Assessment completed upon patients arrival to unit and care assumed.

## 2021-01-24 NOTE — PROGRESS NOTES
End of Shift Note    Bedside shift change report given to Erin Granger RN (oncoming nurse) by Kaiser Osorio RN (offgoing nurse). Report included the following information SBAR, Kardex, Procedure Summary and MAR    Shift worked:  7p-7a     Spift summary and any significant changes:    S/P ERCP with sphincterotomy , Pt developed severe abd pain after procedure and had STAT CT scan and Labs, found to have pancreatitis, lipase > 3000. Pt and  aware she is to remain NPO and will have IV fluids running fairly fast.     Concerns for physician to address:      Zone phone for oncoming shift:  [phone]       Activity:  Activity Level: Up ad herb  Number times ambulated in hallways past shift: 0  Number of times OOB to chair past shift: 0    Cardiac:   Cardiac Monitoring: No      Cardiac Rhythm: Normal sinus rhythm, Sinus bradycardia    Access:   Current line(s): PIV     Genitourinary:   Urinary status: voiding    Respiratory:   O2 Device: Room air  Chronic home O2 use?: NO  Incentive spirometer at bedside: N/A     GI:  Last Bowel Movement Date: 01/22/21  Current diet:  DIET NPO With Ice Chips  Passing flatus: YES  Tolerating current diet: YES       Pain Management:   Patient states pain is manageable on current regimen: YES    Skin:  Daniel Score: 22  Interventions: float heels, increase time out of bed and limit briefs    Patient Safety:  Fall Score:  Total Score: 1  Interventions: assistive device (walker, cane, etc), gripper socks and pt to call before getting OOB       Length of Stay:  Expected LOS: - - -  Actual LOS: 2      Kaiser Osorio RN

## 2021-01-24 NOTE — CONSULTS
Surgery History and Physical    Subjective:      Donny Jameson is a 36 y.o. female who presents for evaluation of RUQ pain off and on for a year. In the ED she was found to have elevated LFTs and bili in the urine and a 9 mm CBD. MRCP confirmed a CBD stone. There are no stones in the gallbladder on imaging. History reviewed. No pertinent past medical history. History reviewed. No pertinent surgical history. History reviewed. No pertinent family history. Social History     Tobacco Use    Smoking status: Never Smoker   Substance Use Topics    Alcohol use: No      Prior to Admission medications    Medication Sig Start Date End Date Taking? Authorizing Provider   ibuprofen (MOTRIN) 800 mg tablet Take 1 Tab by mouth every six (6) hours as needed for Pain. 13  Yes Dearl MD Fernando      Allergies   Allergen Reactions    Pcn [Penicillins] Unknown (comments)       Review of Systems   Constitutional: Negative for chills, diaphoresis and fever. Respiratory: Negative for shortness of breath and wheezing. Cardiovascular: Negative for chest pain and palpitations. Gastrointestinal: Positive for abdominal pain. Negative for diarrhea, nausea and vomiting. Musculoskeletal: Negative for myalgias. Hematological: Does not bruise/bleed easily. Objective:     Patient Vitals for the past 8 hrs:   BP Temp Pulse Resp SpO2   21 0732 (!) 115/59 98.1 °F (36.7 °C) 66 18 96 %   21 0334 (!) 106/48 97.5 °F (36.4 °C) 63 18 98 %       Temp (24hrs), Av °F (36.7 °C), Min:97.5 °F (36.4 °C), Max:98.3 °F (36.8 °C)      Physical Exam  Constitutional:       General: She is not in acute distress. Appearance: She is well-developed. HENT:      Head: Normocephalic and atraumatic. Cardiovascular:      Rate and Rhythm: Normal rate and regular rhythm. Heart sounds: Normal heart sounds. Pulmonary:      Breath sounds: Normal breath sounds. No wheezing or rales.    Abdominal:      General: Bowel sounds are normal. There is no distension. Palpations: Abdomen is soft. There is no mass. Tenderness: There is no abdominal tenderness. There is no guarding or rebound. Musculoskeletal: Normal range of motion. Lymphadenopathy:      Cervical: No cervical adenopathy. Assessment:     Choledocholithiasis without evidence of cholelithiasis or cholecystitis. For ERCP today. Plan:     Unless gallstones visualized on ERCP cholangiography today, pt has no indication for cholecystectomy. Will f/u after procedure. Thanks for the consult for this very pleasant lady.

## 2021-01-24 NOTE — PROGRESS NOTES
Hospitalist Progress Note    NAME: Jie Medrano   :  1980   MRN:  [id number]       Assessment / Plan:  Abdominal pain  Obstructive jaundice  Biliary colic  -MRCP showed CBD stone  -Patient is getting ERCP done today  -Continue IV fluids  -IV analgesia hydromorphone 0.2 mg every 2 hours  -Patient reports itching likely due to hyperbilirubinemia  -We will use hydroxyzine RN  -Spoke to the surgical team and advised since patient's gallbladder does not show any stone, there is no immediate need for cholecystectomy. Patient will get electrical angiography during ERCP and if there is evidence of stones then we will proceed with cholecystectomy       Code Status: Full code  Surrogate Decision Maker:  Farhat Neal     DVT Prophylaxis: SCDs  GI Prophylaxis: not indicated     Baseline: Independent         Subjective:     Chief Complaint / Reason for Physician Visit  \" Patient feeling better, abdominal pain is under control. Denies any nausea or vomiting\". Discussed with RN events overnight. Review of Systems:  Symptom Y/N Comments  Symptom Y/N Comments   Fever/Chills n   Chest Pain n    Poor Appetite n   Edema n    Cough n   Abdominal Pain n    Sputum n   Joint Pain     SOB/COREA n   Pruritis/Rash     Nausea/vomit n   Tolerating PT/OT     Diarrhea n   Tolerating Diet     Constipation n   Other       Could NOT obtain due to:      Objective:     VITALS:   Last 24hrs VS reviewed since prior progress note.  Most recent are:  Patient Vitals for the past 24 hrs:   Temp Pulse Resp BP SpO2   21 1121 98.8 °F (37.1 °C) 61 19 120/67 98 %   21 1044 98.2 °F (36.8 °C) 60 18 116/79 94 %   21 0732 98.1 °F (36.7 °C) 66 18 (!) 115/59 96 %   21 0334 97.5 °F (36.4 °C) 63 18 (!) 106/48 98 %   21 2250 98.3 °F (36.8 °C) 64 18 104/61 99 %   21 2005 98 °F (36.7 °C) 66 18 (!) 97/55 99 %   21 1538 98.3 °F (36.8 °C) 60 16 (!) 100/57 100 %       Intake/Output Summary (Last 24 hours) at 1/24/2021 1145  Last data filed at 1/24/2021 2778  Gross per 24 hour   Intake 3791.67 ml   Output --   Net 3791.67 ml      Face-to-face encounter was provided on January 24, 2021 with the following findings  PHYSICAL EXAM:  General: WD, WN. Alert, cooperative, no acute distress    EENT:  EOMI. Anicteric sclerae. MMM  Resp:  CTA bilaterally, no wheezing or rales. No accessory muscle use  CV:  Regular  rhythm,  No edema  GI:  Soft, Non distended, Non tender.  +Bowel sounds  Neurologic:  Alert and oriented X 3, normal speech,   Psych:   Good insight. Not anxious nor agitated  Skin:  No rashes. No jaundice    Reviewed most current lab test results and cultures  YES  Reviewed most current radiology test results   YES  Review and summation of old records today    NO  Reviewed patient's current orders and MAR    YES  PMH/SH reviewed - no change compared to H&P  ________________________________________________________________________  Care Plan discussed with:    Comments   Patient x    Family      RN x    Care Manager     Consultant  x  GI and surgery                     Multidiciplinary team rounds were held today with , nursing, pharmacist and clinical coordinator. Patient's plan of care was discussed; medications were reviewed and discharge planning was addressed. ________________________________________________________________________  Total NON critical care TIME:  30   Minutes    Total CRITICAL CARE TIME Spent:   Minutes non procedure based      Comments   >50% of visit spent in counseling and coordination of care     ________________________________________________________________________  Ranjit Acevedo MD     Procedures: see electronic medical records for all procedures/Xrays and details which were not copied into this note but were reviewed prior to creation of Plan. LABS:  I reviewed today's most current labs and imaging studies.   Pertinent labs include:  Recent Labs 01/24/21 0312 01/22/21  1821   WBC 3.8 11.8*   HGB 9.5* 10.8*   HCT 30.0* 33.3*    284     Recent Labs     01/24/21 0312 01/22/21  1821    138   K 3.7 3.7    103   CO2 28 29   GLU 81 134*   BUN 5* 8   CREA 0.50* 0.59   CA 8.7 8.7   ALB 2.7* 3.4*   TBILI 2.9* 1.6*   * 157*       Signed: Yahir Weaver MD

## 2021-01-24 NOTE — PROGRESS NOTES
Surgery      ERCP findings reviewed with Dr. Freda Mosquera. No gallstones visualized on retrograde cholangiogram.  No plans for  cholecystectomy at this time. Okay to discharge home once cleared by GI. Basilia Cole.  Neelam Marrufo MD, [facility]

## 2021-01-24 NOTE — PROGRESS NOTES
Problem: Falls - Risk of  Goal: *Absence of Falls  Description: Document Andre Perkins Fall Risk and appropriate interventions in the flowsheet.   Outcome: Progressing Towards Goal  Note: Fall Risk Interventions:            Medication Interventions: Teach patient to arise slowly                   Problem: Pain  Goal: *Control of Pain  Outcome: Progressing Towards Goal

## 2021-01-24 NOTE — PROGRESS NOTES
GI note:    Plan of care and new development also d/w marga Landa's attending hospitalist.    SMA Pia MD

## 2021-01-24 NOTE — PROCEDURES
NAME:  Donny Jameson   :   1980   MRN:   [id number]     Date/Time:  2021 12:36 PM    Procedure Type:   ERCPwith biliary sphincterotomy, biliary stone removal and balloon sphincteroplasty    Indications:   jaundice, abnormal CT/MRCP    Pre-operative Diagnosis: See indication above    Post-operative Diagnosis:  See findings below    : Austin Perea MD    Referring Provider: --None    Sedation:  General Anesthesia    Procedure Details:      After informed consent was obtained with all risks and benefits of procedure explained, the patient was brought to the operating room and given general anesthesia in a supine position on the OR table. The patient was then carefully moved to a modified prone position on a bean bag. All bony prominences were protected. The head and neck were appropriately aligned. Both legs were padded and appropriately positioned. SCDs were placed on both legs. The patient was secured to the table. Upon giving sedation as per above, the Olympus duodenoscope [id number]   was inserted via the mouthpeice and carefully advanced to the second portion of the duodenum. The quality of visualization was good. The duodenoscope was withdrawn into a short position. Findings:       Endoscopic: -normal esophagus, stomach, and duodenum    Ampulla: small     Cholangiogram: -filling defect, 10 mm in size, located in the common bile duct . Pancreatogram: not performed    Procedure in detail:    Using the Σκαφίδια 233 MG86 Dreamtome preloaded with a 0.035 inch Dreamwire, the common bile duct was successfully cannulated. Proper positioning was confirmed with bile aspiration. Limited contrast was injected and revealed medium sized round filling defect/stone located in the  common bile duct. Next, a 8 mm biliary sphincterotomy was performed. Intra mural bile duct is small and does not allow for a large sphincterotomy.     Using a Σκαφίδια 233 9 mm -12 mm biliary extraction balloon, the common bile duct stone  extraction was attempted hoping this would be a soft stone. This did not succeed. I then used a 8 mm Hurricane Biliary Dilating balloon and performed a balloon sphincteroplasty, under flouroscopy. A large, round, 1 cm stone was then removed easily with a 12 mm extraction balloon. Multiple additional sweeps were performed confirming that there were no retained stones or fragments. Finally, an occlusion cholangiogram was performed and there were no additional filling defects (some air bubbles were noted) appreciated. I then lavaged the bile duct x 2 w/ normal saline. There was spontaneous drainage of bile and contrast.     The pancreatic duct was not injected during this procedure. Wire entered it x 1. I performed all immediate radiologic interpretation during this procedure      Complications: None. Recommendations:    -Follow LFTs. -Full liquid diet. -D/c home tomorrow if she has no symptoms. Agueda Cid MD

## 2021-01-24 NOTE — PERIOP NOTES
1300 TRANSFER - OUT REPORT:    Verbal report given to Luciana Denson RN(name) on Rasheeda Rabago  being transferred to 2135(unit) for routine post - op       Report consisted of patients Situation, Background, Assessment and   Recommendations(SBAR). Information from the following report(s) SBAR, Kardex, ED Summary, OR Summary, Procedure Summary, Intake/Output, MAR, Accordion, Recent Results, Med Rec Status, Cardiac Rhythm SB/NSR, Alarm Parameters , Pre Procedure Checklist, Procedure Verification and Quality Measures was reviewed with the receiving nurse. Opportunity for questions and clarification was provided.       Patient transported with:   Registered Nurse

## 2021-01-24 NOTE — PERIOP NOTES
Handoff Report from Operating Room to PACU    Report received from Bernard Bhatt RN and Kevin Hatch CRNA regarding Hailey Rome. Surgeon(s):  Syed Delgado MD  And Procedure(s) (LRB):  ENDOSCOPIC RETROGRADE CHOLANGIOPANCREATOGRAPHY (ERCP), SPHINCTEROTOMY, SPHINCTOPLASTY, AND STONE REMOVAL (N/A)  confirmed   with allergies and dressings discussed. Anesthesia type, drugs, patient history, complications, estimated blood loss, vital signs, intake and output, and lines were reviewed.

## 2021-01-24 NOTE — PROGRESS NOTES
GI f/u note:  CT and lipase c/w post procedure pancreatitis. I spoke w/ Dr Yenifer Hairston will see pt and talk to family) and Pt's RN Prerna Mata. Will keep her NPO, with liberal IVF(Will give her 2-3 litres tonight) and then 250 ml/hr of LR,  Pain and emesis control. IV PPI for gastritis.     SMA Pia MD

## 2021-01-24 NOTE — PROGRESS NOTES
GI note:  I was paged by Vicky Segundo, Ms Kirsten Holliday' RN re: pt c.o 8/10 pain not responding to low dose Dilaudid. I ordered 1 mg of IV Dilaudid, STAT Lipase and CT abdomen/pelvis and to give her 500 ml LR bolus and asked her to increase the IVF rate to 250 ml/hour. I called the pt room first. The number was busy. I then called Vicky Segundo and spoke w/ her who asked pt/family to place the phone on the hook. I then spoke with Mr Kirsten Holliday and told him about the plan. Cell number(mine) given to Vicky Segundo to call me ASAP CT/Lipase/amylase results are available. Also ordered rectal Indomethacin.     Heike

## 2021-01-24 NOTE — PROGRESS NOTES
Post procedure patient was complaining of severe abdominal pain. CT ABD/Pelvis done showing pancreatitis and lipase levels >3000  Spoke to Dr Kishan Perez, patient and her  and explained that pancreatitis is a known complication of ERCP. Explained the course of treatment and will continue to follow.   Discussed with the RN as well

## 2021-01-24 NOTE — PROGRESS NOTES
Call placed to Dr. Freda Mosquera re:  Pt having a lot of pain in abd , has scheduled Dilaudid 0,2 mg, which I gave about 15 minutes ago. Remains with c/o 8-9/10 abd pain in mid abdomen. Pt has only had ice water to drink.

## 2021-01-24 NOTE — PROGRESS NOTES
End of Shift Note    Bedside shift change report given to Princess Washington (oncoming nurse) by Marlin Mohs Finchum (offgoing nurse). Report included the following information SBAR, Kardex, Procedure Summary and MAR    Shift worked:  7p-7a     Shift summary and any significant changes:    NPO since midnight. Up ad herb in room and to bathroom. [place] she did not need pain medication. Concerns for physician to address:      Zone phone for oncoming shift:  [phone]       Activity:  Activity Level: Up ad herb  Number times ambulated in hallways past shift: 0  Number of times OOB to chair past shift: 0    Cardiac:   Cardiac Monitoring: No           Access:   Current line(s): PIV     Genitourinary:   Urinary status: voiding    Respiratory:   O2 Device: Room air  Chronic home O2 use?: NO  Incentive spirometer at bedside: N/A     GI:  Last Bowel Movement Date: 01/22/21  Current diet:  DIET T&A CLEAR LIQUID  Passing flatus: YES  Tolerating current diet: YES       Pain Management:   Patient states pain is manageable on current regimen: YES    Skin:  Daniel Score: 22  Interventions: float heels, increase time out of bed and limit briefs    Patient Safety:  Fall Score:  Total Score: 0  Interventions: assistive device (walker, cane, etc), gripper socks and pt to call before getting OOB       Length of Stay:  Expected LOS: - - -  Actual LOS: [address] [facility]

## 2021-01-24 NOTE — ANESTHESIA POSTPROCEDURE EVALUATION
Procedure(s):  ENDOSCOPIC RETROGRADE CHOLANGIOPANCREATOGRAPHY (ERCP), SPHINCTEROTOMY, SPHINCTOPLASTY, AND STONE REMOVAL. general    Anesthesia Post Evaluation        Patient location during evaluation: PACU  Note status: Adequate. Level of consciousness: responsive to verbal stimuli and sleepy but conscious  Pain management: satisfactory to patient  Airway patency: patent  Anesthetic complications: no  Cardiovascular status: acceptable  Respiratory status: acceptable  Hydration status: acceptable  Comments: +Post-Anesthesia Evaluation and Assessment    Patient: Jossy Thompson MRN: [id number]  SSN: [id number]   YOB: 1980  Age: 36 y.o. Sex: female      Cardiovascular Function/Vital Signs    BP (!) (P) 97/59 (BP 1 Location: Right arm, BP Patient Position: At rest)   Pulse (P) 61   Temp 36.8 °C (98.3 °F)   Resp (P) 17   Ht 5' 6\" (1.676 m)   Wt 94.8 kg (209 lb)   SpO2 (P) 97%   BMI 33.73 kg/m²     Patient is status post Procedure(s):  ENDOSCOPIC RETROGRADE CHOLANGIOPANCREATOGRAPHY (ERCP), SPHINCTEROTOMY, SPHINCTOPLASTY, AND STONE REMOVAL. Nausea/Vomiting: Controlled. Postoperative hydration reviewed and adequate. Pain:  Pain Scale 1: Numeric (0 - 10) (01/24/21 1121)  Pain Intensity 1: 0 (01/24/21 1121)   Managed. Neurological Status:   Neuro (WDL): Within Defined Limits (01/24/21 1121)   At baseline. Mental Status and Level of Consciousness: Arousable. Pulmonary Status:   O2 Device: Nasal cannula (01/24/21 1246)   Adequate oxygenation and airway patent. Complications related to anesthesia: None    Post-anesthesia assessment completed. No concerns.     Signed By: Jose R Can MD    1/24/2021  Post anesthesia nausea and vomiting:  controlled      INITIAL Post-op Vital signs:   Vitals Value Taken Time   BP 95/59 01/24/21 1315   Temp 36.8 °C (98.3 °F) 01/24/21 1246   Pulse 54 01/24/21 1316   Resp 18 01/24/21 1316   SpO2 98 % 01/24/21 1316   Vitals shown include unvalidated device data.

## 2021-01-25 LAB
ALBUMIN SERPL-MCNC: 2.6 G/DL (ref 3.5–5)
ALBUMIN/GLOB SERPL: 0.7 {RATIO} (ref 1.1–2.2)
ALP SERPL-CCNC: 629 U/L (ref 45–117)
ALT SERPL-CCNC: 124 U/L (ref 12–78)
ANION GAP SERPL CALC-SCNC: 7 MMOL/L (ref 5–15)
AST SERPL-CCNC: 92 U/L (ref 15–37)
BILIRUB DIRECT SERPL-MCNC: 1.9 MG/DL (ref 0–0.2)
BILIRUB SERPL-MCNC: 2.7 MG/DL (ref 0.2–1)
BUN SERPL-MCNC: 9 MG/DL (ref 6–20)
BUN/CREAT SERPL: 18 (ref 12–20)
CALCIUM SERPL-MCNC: 8.4 MG/DL (ref 8.5–10.1)
CHLORIDE SERPL-SCNC: 104 MMOL/L (ref 97–108)
CO2 SERPL-SCNC: 26 MMOL/L (ref 21–32)
CREAT SERPL-MCNC: 0.49 MG/DL (ref 0.55–1.02)
ERYTHROCYTE [DISTWIDTH] IN BLOOD BY AUTOMATED COUNT: 14.3 % (ref 11.5–14.5)
GLOBULIN SER CALC-MCNC: 3.5 G/DL (ref 2–4)
GLUCOSE SERPL-MCNC: 111 MG/DL (ref 65–100)
HCT VFR BLD AUTO: 32.4 % (ref 35–47)
HGB BLD-MCNC: 10.3 G/DL (ref 11.5–16)
LIPASE SERPL-CCNC: >3000 U/L (ref 73–393)
MCH RBC QN AUTO: 27.5 PG (ref 26–34)
MCHC RBC AUTO-ENTMCNC: 31.8 G/DL (ref 30–36.5)
MCV RBC AUTO: 86.6 FL (ref 80–99)
NRBC # BLD: 0 K/UL (ref 0–0.01)
NRBC BLD-RTO: 0 PER 100 WBC
PLATELET # BLD AUTO: 282 K/UL (ref 150–400)
PMV BLD AUTO: 12.7 FL (ref 8.9–12.9)
POTASSIUM SERPL-SCNC: 3.7 MMOL/L (ref 3.5–5.1)
PROT SERPL-MCNC: 6.1 G/DL (ref 6.4–8.2)
RBC # BLD AUTO: 3.74 M/UL (ref 3.8–5.2)
SODIUM SERPL-SCNC: 137 MMOL/L (ref 136–145)
WBC # BLD AUTO: 8.2 K/UL (ref 3.6–11)

## 2021-01-25 PROCEDURE — 80053 COMPREHEN METABOLIC PANEL: CPT

## 2021-01-25 PROCEDURE — 74011250636 HC RX REV CODE- 250/636: Performed by: INTERNAL MEDICINE

## 2021-01-25 PROCEDURE — 82248 BILIRUBIN DIRECT: CPT

## 2021-01-25 PROCEDURE — 36415 COLL VENOUS BLD VENIPUNCTURE: CPT

## 2021-01-25 PROCEDURE — C9113 INJ PANTOPRAZOLE SODIUM, VIA: HCPCS | Performed by: PHYSICIAN ASSISTANT

## 2021-01-25 PROCEDURE — 74011000250 HC RX REV CODE- 250: Performed by: INTERNAL MEDICINE

## 2021-01-25 PROCEDURE — 83690 ASSAY OF LIPASE: CPT

## 2021-01-25 PROCEDURE — 74011250636 HC RX REV CODE- 250/636: Performed by: PHYSICIAN ASSISTANT

## 2021-01-25 PROCEDURE — 85027 COMPLETE CBC AUTOMATED: CPT

## 2021-01-25 PROCEDURE — 74011000250 HC RX REV CODE- 250: Performed by: PHYSICIAN ASSISTANT

## 2021-01-25 PROCEDURE — 65270000029 HC RM PRIVATE

## 2021-01-25 PROCEDURE — 2709999900 HC NON-CHARGEABLE SUPPLY

## 2021-01-25 PROCEDURE — 74011250636 HC RX REV CODE- 250/636: Performed by: STUDENT IN AN ORGANIZED HEALTH CARE EDUCATION/TRAINING PROGRAM

## 2021-01-25 RX ORDER — HYDROMORPHONE HYDROCHLORIDE 1 MG/ML
1 INJECTION, SOLUTION INTRAMUSCULAR; INTRAVENOUS; SUBCUTANEOUS
Status: DISCONTINUED | OUTPATIENT
Start: 2021-01-25 | End: 2021-02-03 | Stop reason: HOSPADM

## 2021-01-25 RX ADMIN — LEVOFLOXACIN 500 MG: 5 INJECTION, SOLUTION INTRAVENOUS at 15:10

## 2021-01-25 RX ADMIN — HYDROMORPHONE HYDROCHLORIDE 1 MG: 1 INJECTION, SOLUTION INTRAMUSCULAR; INTRAVENOUS; SUBCUTANEOUS at 08:47

## 2021-01-25 RX ADMIN — HYDROMORPHONE HYDROCHLORIDE 1 MG: 1 INJECTION, SOLUTION INTRAMUSCULAR; INTRAVENOUS; SUBCUTANEOUS at 20:45

## 2021-01-25 RX ADMIN — SODIUM CHLORIDE, POTASSIUM CHLORIDE, SODIUM LACTATE AND CALCIUM CHLORIDE 500 ML: 600; 310; 30; 20 INJECTION, SOLUTION INTRAVENOUS at 07:00

## 2021-01-25 RX ADMIN — SODIUM CHLORIDE, POTASSIUM CHLORIDE, SODIUM LACTATE AND CALCIUM CHLORIDE 250 ML/HR: 600; 310; 30; 20 INJECTION, SOLUTION INTRAVENOUS at 19:31

## 2021-01-25 RX ADMIN — HYDROMORPHONE HYDROCHLORIDE 1 MG: 1 INJECTION, SOLUTION INTRAMUSCULAR; INTRAVENOUS; SUBCUTANEOUS at 16:29

## 2021-01-25 RX ADMIN — HYDROMORPHONE HYDROCHLORIDE 1 MG: 1 INJECTION, SOLUTION INTRAMUSCULAR; INTRAVENOUS; SUBCUTANEOUS at 05:41

## 2021-01-25 RX ADMIN — Medication 10 ML: at 13:47

## 2021-01-25 RX ADMIN — HYDROMORPHONE HYDROCHLORIDE 1 MG: 1 INJECTION, SOLUTION INTRAMUSCULAR; INTRAVENOUS; SUBCUTANEOUS at 10:52

## 2021-01-25 RX ADMIN — SODIUM CHLORIDE 40 MG: 9 INJECTION, SOLUTION INTRAMUSCULAR; INTRAVENOUS; SUBCUTANEOUS at 13:39

## 2021-01-25 RX ADMIN — HYDROMORPHONE HYDROCHLORIDE 1 MG: 1 INJECTION, SOLUTION INTRAMUSCULAR; INTRAVENOUS; SUBCUTANEOUS at 23:08

## 2021-01-25 RX ADMIN — FAMOTIDINE 20 MG: 10 INJECTION INTRAVENOUS at 20:45

## 2021-01-25 RX ADMIN — SODIUM CHLORIDE, POTASSIUM CHLORIDE, SODIUM LACTATE AND CALCIUM CHLORIDE 250 ML/HR: 600; 310; 30; 20 INJECTION, SOLUTION INTRAVENOUS at 05:41

## 2021-01-25 RX ADMIN — HYDROMORPHONE HYDROCHLORIDE 1 MG: 1 INJECTION, SOLUTION INTRAMUSCULAR; INTRAVENOUS; SUBCUTANEOUS at 14:21

## 2021-01-25 RX ADMIN — HYDROMORPHONE HYDROCHLORIDE 1 MG: 1 INJECTION, SOLUTION INTRAMUSCULAR; INTRAVENOUS; SUBCUTANEOUS at 18:47

## 2021-01-25 RX ADMIN — SODIUM CHLORIDE, POTASSIUM CHLORIDE, SODIUM LACTATE AND CALCIUM CHLORIDE 250 ML/HR: 600; 310; 30; 20 INJECTION, SOLUTION INTRAVENOUS at 08:48

## 2021-01-25 RX ADMIN — HYDROMORPHONE HYDROCHLORIDE 1 MG: 1 INJECTION, SOLUTION INTRAMUSCULAR; INTRAVENOUS; SUBCUTANEOUS at 01:58

## 2021-01-25 RX ADMIN — ONDANSETRON 4 MG: 2 INJECTION INTRAMUSCULAR; INTRAVENOUS at 13:38

## 2021-01-25 RX ADMIN — FAMOTIDINE 20 MG: 10 INJECTION INTRAVENOUS at 08:47

## 2021-01-25 RX ADMIN — Medication 10 ML: at 14:21

## 2021-01-25 RX ADMIN — FENTANYL CITRATE 12.5 MCG: 50 INJECTION, SOLUTION INTRAMUSCULAR; INTRAVENOUS at 07:05

## 2021-01-25 RX ADMIN — Medication 10 ML: at 05:48

## 2021-01-25 RX ADMIN — Medication 10 ML: at 23:09

## 2021-01-25 RX ADMIN — Medication 10 ML: at 13:48

## 2021-01-25 NOTE — PROGRESS NOTES
Reason for Admission:   Abdominal pain  Obstructive jaundice  Biliary colic  ERCP pancreatitis                   RUR Score:          7%           Plan for utilizing home health:      No    PCP: First and Last name:  No ne- need New PCP   Name of Practice:    Are you a current patient: Yes/No:    Approximate date of last visit:    Can you participate in a virtual visit with your PCP:                     Current Advanced Directive/Advance Care Plan:                        Kvng Ivan (ACP) Conversation      Date of Conversation: 1/25/21  Conducted with: Patient with Sara 153: Today we CM discussed with pt. pt declined to complete at this time. Content/Action Overview:   Has NO ACP documents/care preferences - information provided, considering goals and options  Reviewed DNR/DNI and patient elects Full Code (Attempt Resuscitation)    Length of Voluntary ACP Conversation in minutes:  16 minutes    Usha Prajapati RN           Transition of Care Plan:          D/C Home  F/U appointment   to provide transportation    CM introduced self and role as d/c planner. CM verified with pt, pt demographics, insurance info and emergency contact. Pt lives with  in one story home with 4 steps to enter. Pt independent in ADL's, ambulating and family provides transportation. No DME. No HH or SNF. Uses [facility] on [address].     CM will continue to follow pt for D/C planning and arrange services as deemed neccessary    CM will continue to follow pt for D/C planning and arrange services as deemed neccessary    [address] [facility]  Phone: [phone]

## 2021-01-25 NOTE — PROGRESS NOTES
ED [facility] Brief GI Progress Note  HEIKE Damon   for Dr. Dev Thornton    Patient seen, reviewed Dr. Gianna Parmar note from earlier today. Her current pain is around 2/10, nausea but no vomiting. No F/C. Passing flatus but no BM. Some belching that worsens pain. Reviewed pancreatitis and it's range of severity with possible multi organ failure and need for ICU transfer. Lipase remains >3,000, we will continue to monitor, treat with IVF and pain management as you are. Keep NPO. Continue Levaquin given possible UTI.      HEIKE Romero  01/25/21  12:30 PM

## 2021-01-25 NOTE — PROGRESS NOTES
Hospitalist Progress Note    NAME: Cali Melton   :  1980   MRN:  [id number]       Assessment / Plan:  Abdominal pain  Obstructive jaundice  Biliary colic  ERCP pancreatitis  -MRCP showed CBD stone  -Status post ERCP and stone removal with sphincterotomy  -Developed post ERCP pancreatitis  -Continue IV fluids  -IV analgesia hydromorphone 1 mg every 3 hours as needed  -Patient had GI consultation  -Monitor lipase levels  -N.p.o. for now       Code Status: Full code  Surrogate Decision Maker:  Farhat Neal     DVT Prophylaxis:  Lovenox subcu  GI Prophylaxis: not indicated     Baseline: Independent         Subjective:     Chief Complaint / Reason for Physician Visit  \" Patient feeling better, abdominal pain is under control with pain medication. Denies any nausea or vomiting\". Discussed with RN events overnight. Review of Systems:  Symptom Y/N Comments  Symptom Y/N Comments   Fever/Chills n   Chest Pain n    Poor Appetite n   Edema n    Cough n   Abdominal Pain n    Sputum n   Joint Pain     SOB/COREA n   Pruritis/Rash     Nausea/vomit n   Tolerating PT/OT     Diarrhea n   Tolerating Diet     Constipation n   Other       Could NOT obtain due to:      Objective:     VITALS:   Last 24hrs VS reviewed since prior progress note.  Most recent are:  Patient Vitals for the past 24 hrs:   Temp Pulse Resp BP SpO2   21 1502 97.9 °F (36.6 °C) 71 18 114/69 90 %   21 0827 98.1 °F (36.7 °C) 85 18 (!) 138/99 95 %   21 0252 97.7 °F (36.5 °C) 71 18 115/69 96 %   21 2345 97.9 °F (36.6 °C) 66 18 126/73 96 %   21 2017 98.1 °F (36.7 °C) 64 18 118/72 96 %   21 1621 98.4 °F (36.9 °C) (!) 55 20 (!) 111/57 99 %       Intake/Output Summary (Last 24 hours) at 2021 1508  Last data filed at 2021 0549  Gross per 24 hour   Intake 4925 ml   Output --   Net 4925 ml      Face-to-face encounter was provided on 2021 with the following findings  PHYSICAL EXAM:  General: WD, WN. Alert, cooperative, no acute distress    EENT:  EOMI. Anicteric sclerae. MMM  Resp:  CTA bilaterally, no wheezing or rales. No accessory muscle use  CV:  Regular  rhythm,  No edema  GI:  Soft, Non distended, tenderness in the right upper quadrant and epigastrium.  +Bowel sounds  Neurologic:  Alert and oriented X 3, normal speech,   Psych:   Good insight. Not anxious nor agitated  Skin:  No rashes. No jaundice    Reviewed most current lab test results and cultures  YES  Reviewed most current radiology test results   YES  Review and summation of old records today    NO  Reviewed patient's current orders and MAR    YES  PMH/ reviewed - no change compared to H&P  ________________________________________________________________________  Care Plan discussed with:    Comments   Patient x    Family      RN x    Care Manager     Consultant  x  GI                      Multidiciplinary team rounds were held today with , nursing, pharmacist and clinical coordinator. Patient's plan of care was discussed; medications were reviewed and discharge planning was addressed. ________________________________________________________________________  Total NON critical care TIME:  30   Minutes    Total CRITICAL CARE TIME Spent:   Minutes non procedure based      Comments   >50% of visit spent in counseling and coordination of care     ________________________________________________________________________  Cholo De La Fuente MD     Procedures: see electronic medical records for all procedures/Xrays and details which were not copied into this note but were reviewed prior to creation of Plan. LABS:  I reviewed today's most current labs and imaging studies.   Pertinent labs include:  Recent Labs     01/25/21  0206 01/24/21  0312 01/22/21  1821   WBC 8.2 3.8 11.8*   HGB 10.3* 9.5* 10.8*   HCT 32.4* 30.0* 33.3*    235 284     Recent Labs     01/25/21  0206 01/24/21  0312 01/22/21  1821   NA 137 140 138   K 3.7 3.7 3.7    107 103   CO2 26 28 29   * 81 134*   BUN 9 5* 8   CREA 0.49* 0.50* 0.59   CA 8.4* 8.7 8.7   ALB 2.6* 2.7* 3.4*   TBILI 2.7* 2.9* 1.6*   * 123* 157*       Signed: Debbie Pink MD

## 2021-01-25 NOTE — PROGRESS NOTES
Oxygen saturation obtained per MD Duenas. Sat at 94% on room air. Patient states she does not feel any shortness of breath, but tries not to breathe deeply because it causes her abdominal pain.

## 2021-01-25 NOTE — PROGRESS NOTES
End of Shift Note    Bedside shift change report given to 535Barbara Obrien Yuly (oncoming nurse) by Patricia Mari (offgoing nurse). Report included the following information SBAR, Kardex, Procedure Summary and MAR    Shift worked:  7a-7p     Shift summary and any significant changes:    Patient rested quietly in bed. Complaining of abdominal pain, medicated per MAR. Visitor at bedside this shift. Ambulating in room and to bathroom. Concerns for physician to address:      Zone phone for oncoming shift:         Activity:  Activity Level: Up with Assistance  Number times ambulated in hallways past shift: 0  Number of times OOB to chair past shift: 0    Cardiac:   Cardiac Monitoring: Yes      Cardiac Rhythm: Normal sinus rhythm, Sinus bradycardia    Access:   Current line(s): PIV     Genitourinary:   Urinary status: voiding    Respiratory:   O2 Device: Room air  Chronic home O2 use?: NO  Incentive spirometer at bedside: YES     GI:  Last Bowel Movement Date: 01/22/21  Current diet:  DIET NPO With Ice Chips  Passing flatus: YES  Tolerating current diet: YES       Pain Management:   Patient states pain is manageable on current regimen: YES    Skin:  Daniel Score: 22  Interventions: float heels, increase time out of bed and limit briefs    Patient Safety:  Fall Score:  Total Score: 2  Interventions: assistive device (walker, cane, etc), gripper socks, pt to call before getting OOB and stay with me (per policy)       Length of Stay:  Expected LOS: - - -  Actual LOS: [facility]

## 2021-01-25 NOTE — PROGRESS NOTES
Gastroenterology Progress Note    1/25/2021    Admit Date: 1/22/2021    Subjective: Follow up for: Obstructive jaundice,RUQ pain, post ERCP pancreatitis      C/o mild 2/10 pain. No nausea or vomiting. Patient was seen in rounds by me today. At this time, the patient is resting comfortably. The patient has no new complaints today. There is no bleeding.         Current Facility-Administered Medications   Medication Dose Route Frequency    HYDROmorphone (PF) (DILAUDID) injection 1 mg  1 mg IntraVENous Q2H PRN    lactated ringers bolus infusion 500 mL  500 mL IntraVENous ONCE    sodium chloride (NS) flush 5-40 mL  5-40 mL IntraVENous Q8H    sodium chloride (NS) flush 5-40 mL  5-40 mL IntraVENous PRN    sodium chloride (NS) flush 5-40 mL  5-40 mL IntraVENous Q8H    sodium chloride (NS) flush 5-40 mL  5-40 mL IntraVENous PRN    lactated Ringers infusion  25 mL/hr IntraVENous CONTINUOUS    sodium chloride (NS) flush 5-40 mL  5-40 mL IntraVENous Q8H    sodium chloride (NS) flush 5-40 mL  5-40 mL IntraVENous PRN    fentaNYL citrate (PF) injection 12.5 mcg  12.5 mcg IntraVENous Q6H PRN    famotidine (PF) (PEPCID) 20 mg in 0.9% sodium chloride 10 mL injection  20 mg IntraVENous Q12H    hydrOXYzine HCL (ATARAX) tablet 25 mg  25 mg Oral TID PRN    levoFLOXacin (LEVAQUIN) 500 mg in D5W IVPB  500 mg IntraVENous Q24H    sodium chloride (NS) flush 5-40 mL  5-40 mL IntraVENous Q8H    sodium chloride (NS) flush 5-40 mL  5-40 mL IntraVENous PRN    acetaminophen (TYLENOL) tablet 650 mg  650 mg Oral Q6H PRN    Or    acetaminophen (TYLENOL) suppository 650 mg  650 mg Rectal Q6H PRN    polyethylene glycol (MIRALAX) packet 17 g  17 g Oral DAILY PRN    promethazine (PHENERGAN) tablet 12.5 mg  12.5 mg Oral Q6H PRN    Or    ondansetron (ZOFRAN) injection 4 mg  4 mg IntraVENous Q6H PRN    lactated Ringers infusion  250 mL/hr IntraVENous CONTINUOUS        Objective:     Blood pressure 115/69, pulse 71, temperature 97.7 °F (36.5 °C), resp. rate 18, height 5' 6\" (1.676 m), weight 94.8 kg (209 lb), last menstrual period 01/21/2021, SpO2 96 %. 01/24 1901 - 01/25 0700  In: 3642.5 [I.V.:3642.5]  Out: -     01/23 0701 - 01/24 1900  In: 5474.2 [P.O.:200; I.V.:5274.2]  Out: -         Physical Examination:       General:AAO x 3, Pleasant  HEENT:  EOMI,   Chest:  CTA,   Heart: S1, S2, RRR  GI: Soft, mild epigastric pain   Extremities: No edema   CNS: CNs grossly normal.    Data Review    Recent Results (from the past 24 hour(s))   LIPASE    Collection Time: 01/24/21  4:51 PM   Result Value Ref Range    Lipase >3,000 (H) 73 - 393 U/L   AMYLASE    Collection Time: 01/24/21  4:51 PM   Result Value Ref Range    Amylase >1,300 (H) 25 - 115 U/L   CBC W/O DIFF    Collection Time: 01/25/21  2:06 AM   Result Value Ref Range    WBC 8.2 3.6 - 11.0 K/uL    RBC 3.74 (L) 3.80 - 5.20 M/uL    HGB 10.3 (L) 11.5 - 16.0 g/dL    HCT 32.4 (L) 35.0 - 47.0 %    MCV 86.6 80.0 - 99.0 FL    MCH 27.5 26.0 - 34.0 PG    MCHC 31.8 30.0 - 36.5 g/dL    RDW 14.3 11.5 - 14.5 %    PLATELET 996 977 - 782 K/uL    MPV 12.7 8.9 - 12.9 FL    NRBC 0.0 0  WBC    ABSOLUTE NRBC 0.00 0.00 - 5.44 K/uL   METABOLIC PANEL, COMPREHENSIVE    Collection Time: 01/25/21  2:06 AM   Result Value Ref Range    Sodium 137 136 - 145 mmol/L    Potassium 3.7 3.5 - 5.1 mmol/L    Chloride 104 97 - 108 mmol/L    CO2 26 21 - 32 mmol/L    Anion gap 7 5 - 15 mmol/L    Glucose 111 (H) 65 - 100 mg/dL    BUN 9 6 - 20 MG/DL    Creatinine 0.49 (L) 0.55 - 1.02 MG/DL    BUN/Creatinine ratio 18 12 - 20      GFR est AA >60 >60 ml/min/1.73m2    GFR est non-AA >60 >60 ml/min/1.73m2    Calcium 8.4 (L) 8.5 - 10.1 MG/DL    Bilirubin, total 2.7 (H) 0.2 - 1.0 MG/DL    ALT (SGPT) 124 (H) 12 - 78 U/L    AST (SGOT) 92 (H) 15 - 37 U/L    Alk.  phosphatase 629 (H) 45 - 117 U/L    Protein, total 6.1 (L) 6.4 - 8.2 g/dL    Albumin 2.6 (L) 3.5 - 5.0 g/dL    Globulin 3.5 2.0 - 4.0 g/dL    A-G Ratio 0.7 (L) 1.1 - 2.2     BILIRUBIN, DIRECT    Collection Time: 01/25/21  2:06 AM   Result Value Ref Range    Bilirubin, direct 1.9 (H) 0.0 - 0.2 MG/DL   LIPASE    Collection Time: 01/25/21  2:06 AM   Result Value Ref Range    Lipase >3,000 (H) 73 - 393 U/L     Recent Labs     01/25/21  0206 01/24/21  0312   WBC 8.2 3.8   HGB 10.3* 9.5*   HCT 32.4* 30.0*    235     Recent Labs     01/25/21  0206 01/24/21  0312 01/22/21  1821    140 138   K 3.7 3.7 3.7    107 103   CO2 26 28 29   BUN 9 5* 8   CREA 0.49* 0.50* 0.59   * 81 134*   CA 8.4* 8.7 8.7     Recent Labs     01/25/21  0206 01/24/21  1651 01/24/21  0312 01/22/21  1821   *  --  692* 878*   TP 6.1*  --  6.3* 7.6   ALB 2.6*  --  2.7* 3.4*   GLOB 3.5  --  3.6 4.2*   AML  --  >1,300*  --   --    LPSE >3,000* >3,000*  --  86     No results for input(s): INR, PTP, APTT, INREXT in the last 72 hours. No results for input(s): FE, TIBC, PSAT, FERR in the last 72 hours. No results found for: FOL, RBCF   No results for input(s): PH, PCO2, PO2 in the last 72 hours. No results for input(s): CPK, CKNDX, TROIQ in the last 72 hours.     No lab exists for component: CPKMB  Lab Results   Component Value Date/Time    Cholesterol, total 185 10/27/2020 10:20 AM    HDL Cholesterol 44 10/27/2020 10:20 AM    LDL, calculated 112.4 (H) 10/27/2020 10:20 AM    Triglyceride 143 10/27/2020 10:20 AM    CHOL/HDL Ratio 4.2 10/27/2020 10:20 AM     No components found for: [place]  Lab Results   Component Value Date/Time    Color DARK YELLOW 01/22/2021 07:34 PM    Appearance CLOUDY (A) 01/22/2021 07:34 PM    Specific gravity 1.025 01/22/2021 07:34 PM    pH (UA) 6.5 01/22/2021 07:34 PM    Protein 30 (A) 01/22/2021 07:34 PM    Glucose Negative 01/22/2021 07:34 PM    Ketone 40 (A) 01/22/2021 07:34 PM    Urobilinogen 2.0 (H) 01/22/2021 07:34 PM    Nitrites Negative 01/22/2021 07:34 PM    Leukocyte Esterase SMALL (A) 01/22/2021 07:34 PM    Epithelial cells FEW 01/22/2021 07:34 PM Bacteria Negative 01/22/2021 07:34 PM    WBC 10-20 01/22/2021 07:34 PM    RBC >100 (H) 01/22/2021 07:34 PM        ROS: -CP, SOB, Dysuria, palpitations, cough. Assessment:    Post ERCP pancreatitis  Elevated LFTS  Abnormal MRCP    Active Problems:    Obstructive jaundice (1/22/2021)             Plan/Discussion:     · I discussed with her in detail and explained in simple terms what post ERCP pancreatitis is. Will give another LR bolus and continue aggressive IVF resuscitation,  · NPO for another day or so,  · She has had no fever, tachycadria or leukocytosis. Continue Levaquin x 3 days as she also has a possible UTI. · I changed her pain meds frequency. · Daily LFTs and lipase,  · Our group will follow along. · Dr Anthony Carmichael and CHRISTOS covering the hospital today. Signed By: Zabrina Hills.  Aguilar Montoya MD    1/25/2021  7:02 AM

## 2021-01-25 NOTE — PROGRESS NOTES
End of Shift Note    Bedside shift change report given to Eldon TYSON (oncoming nurse) by Emily Conn RN (offgoing nurse). Report included the following information SBAR, Kardex, OR Summary, Procedure Summary, Intake/Output, MAR and Recent Results    Shift worked:  [id number]     Shift summary and any significant changes:    Intermittent pian;meds given every 3-4h. Denies nausea. Lipase still >3000     Concerns for physician to address:       Zone phone for oncoming shift:          Activity:  Activity Level: Up with Assistance  Number times ambulated in hallways past shift: 0  Number of times OOB to chair past shift: 0    Cardiac:   Cardiac Monitoring: No      Cardiac Rhythm: Normal sinus rhythm, Sinus bradycardia    Access:   Current line(s): PIV     Genitourinary:   Urinary status: voiding    Respiratory:   O2 Device: Room air  Chronic home O2 use?: NO  Incentive spirometer at bedside: N/A     GI:  Last Bowel Movement Date: 01/22/21  Current diet:  DIET NPO With Ice Chips  Passing flatus: YES  Tolerating current diet: YES       Pain Management:   Patient states pain is manageable on current regimen: YES    Skin:  Daniel Score: 22  Interventions: increase time out of bed and nutritional support     Patient Safety:  Fall Score:  Total Score: 2  Interventions: assistive device (walker, cane, etc), gripper socks, pt to call before getting OOB and stay with me (per policy)       Length of Stay:  Expected LOS: - - -  Actual LOS: 1 [facility], RN

## 2021-01-26 LAB
ALBUMIN SERPL-MCNC: 2.5 G/DL (ref 3.5–5)
ALBUMIN/GLOB SERPL: 0.8 {RATIO} (ref 1.1–2.2)
ALP SERPL-CCNC: 524 U/L (ref 45–117)
ALT SERPL-CCNC: 90 U/L (ref 12–78)
ANION GAP SERPL CALC-SCNC: 7 MMOL/L (ref 5–15)
AST SERPL-CCNC: 63 U/L (ref 15–37)
BILIRUB SERPL-MCNC: 1.8 MG/DL (ref 0.2–1)
BUN SERPL-MCNC: 9 MG/DL (ref 6–20)
BUN/CREAT SERPL: 16 (ref 12–20)
CALCIUM SERPL-MCNC: 8 MG/DL (ref 8.5–10.1)
CHLORIDE SERPL-SCNC: 104 MMOL/L (ref 97–108)
CO2 SERPL-SCNC: 26 MMOL/L (ref 21–32)
CREAT SERPL-MCNC: 0.55 MG/DL (ref 0.55–1.02)
ERYTHROCYTE [DISTWIDTH] IN BLOOD BY AUTOMATED COUNT: 14.6 % (ref 11.5–14.5)
GLOBULIN SER CALC-MCNC: 3.3 G/DL (ref 2–4)
GLUCOSE SERPL-MCNC: 67 MG/DL (ref 65–100)
HCT VFR BLD AUTO: 33.3 % (ref 35–47)
HGB BLD-MCNC: 10.9 G/DL (ref 11.5–16)
LIPASE SERPL-CCNC: >3000 U/L (ref 73–393)
MCH RBC QN AUTO: 28.3 PG (ref 26–34)
MCHC RBC AUTO-ENTMCNC: 32.7 G/DL (ref 30–36.5)
MCV RBC AUTO: 86.5 FL (ref 80–99)
NRBC # BLD: 0 K/UL (ref 0–0.01)
NRBC BLD-RTO: 0 PER 100 WBC
PLATELET # BLD AUTO: 306 K/UL (ref 150–400)
PMV BLD AUTO: 12.8 FL (ref 8.9–12.9)
POTASSIUM SERPL-SCNC: 3.3 MMOL/L (ref 3.5–5.1)
PROT SERPL-MCNC: 5.8 G/DL (ref 6.4–8.2)
RBC # BLD AUTO: 3.85 M/UL (ref 3.8–5.2)
SODIUM SERPL-SCNC: 137 MMOL/L (ref 136–145)
WBC # BLD AUTO: 14.6 K/UL (ref 3.6–11)

## 2021-01-26 PROCEDURE — 94760 N-INVAS EAR/PLS OXIMETRY 1: CPT

## 2021-01-26 PROCEDURE — 74011000250 HC RX REV CODE- 250: Performed by: INTERNAL MEDICINE

## 2021-01-26 PROCEDURE — 65270000029 HC RM PRIVATE

## 2021-01-26 PROCEDURE — 85027 COMPLETE CBC AUTOMATED: CPT

## 2021-01-26 PROCEDURE — 36415 COLL VENOUS BLD VENIPUNCTURE: CPT

## 2021-01-26 PROCEDURE — 74011250636 HC RX REV CODE- 250/636: Performed by: INTERNAL MEDICINE

## 2021-01-26 PROCEDURE — 80053 COMPREHEN METABOLIC PANEL: CPT

## 2021-01-26 PROCEDURE — 74011250636 HC RX REV CODE- 250/636: Performed by: NURSE PRACTITIONER

## 2021-01-26 PROCEDURE — 2709999900 HC NON-CHARGEABLE SUPPLY

## 2021-01-26 PROCEDURE — 83690 ASSAY OF LIPASE: CPT

## 2021-01-26 RX ORDER — POTASSIUM CHLORIDE 7.45 MG/ML
10 INJECTION INTRAVENOUS
Status: COMPLETED | OUTPATIENT
Start: 2021-01-26 | End: 2021-01-26

## 2021-01-26 RX ORDER — ENOXAPARIN SODIUM 100 MG/ML
40 INJECTION SUBCUTANEOUS EVERY 24 HOURS
Status: DISCONTINUED | OUTPATIENT
Start: 2021-01-26 | End: 2021-01-26

## 2021-01-26 RX ADMIN — HYDROMORPHONE HYDROCHLORIDE 1 MG: 1 INJECTION, SOLUTION INTRAMUSCULAR; INTRAVENOUS; SUBCUTANEOUS at 22:35

## 2021-01-26 RX ADMIN — Medication 10 ML: at 14:49

## 2021-01-26 RX ADMIN — Medication 10 ML: at 22:16

## 2021-01-26 RX ADMIN — POTASSIUM CHLORIDE 10 MEQ: 10 INJECTION, SOLUTION INTRAVENOUS at 10:24

## 2021-01-26 RX ADMIN — HYDROMORPHONE HYDROCHLORIDE 1 MG: 1 INJECTION, SOLUTION INTRAMUSCULAR; INTRAVENOUS; SUBCUTANEOUS at 17:26

## 2021-01-26 RX ADMIN — POTASSIUM CHLORIDE 10 MEQ: 10 INJECTION, SOLUTION INTRAVENOUS at 08:15

## 2021-01-26 RX ADMIN — HYDROMORPHONE HYDROCHLORIDE 1 MG: 1 INJECTION, SOLUTION INTRAMUSCULAR; INTRAVENOUS; SUBCUTANEOUS at 14:41

## 2021-01-26 RX ADMIN — SODIUM CHLORIDE, POTASSIUM CHLORIDE, SODIUM LACTATE AND CALCIUM CHLORIDE 250 ML/HR: 600; 310; 30; 20 INJECTION, SOLUTION INTRAVENOUS at 14:41

## 2021-01-26 RX ADMIN — POTASSIUM CHLORIDE 10 MEQ: 10 INJECTION, SOLUTION INTRAVENOUS at 09:40

## 2021-01-26 RX ADMIN — Medication 10 ML: at 06:04

## 2021-01-26 RX ADMIN — Medication 10 ML: at 22:17

## 2021-01-26 RX ADMIN — SODIUM CHLORIDE, POTASSIUM CHLORIDE, SODIUM LACTATE AND CALCIUM CHLORIDE 250 ML/HR: 600; 310; 30; 20 INJECTION, SOLUTION INTRAVENOUS at 06:02

## 2021-01-26 RX ADMIN — POTASSIUM CHLORIDE 10 MEQ: 10 INJECTION, SOLUTION INTRAVENOUS at 11:56

## 2021-01-26 RX ADMIN — LEVOFLOXACIN 500 MG: 5 INJECTION, SOLUTION INTRAVENOUS at 14:41

## 2021-01-26 RX ADMIN — FAMOTIDINE 20 MG: 10 INJECTION INTRAVENOUS at 20:30

## 2021-01-26 RX ADMIN — SODIUM CHLORIDE, POTASSIUM CHLORIDE, SODIUM LACTATE AND CALCIUM CHLORIDE 250 ML/HR: 600; 310; 30; 20 INJECTION, SOLUTION INTRAVENOUS at 01:06

## 2021-01-26 RX ADMIN — FAMOTIDINE 20 MG: 10 INJECTION INTRAVENOUS at 09:40

## 2021-01-26 RX ADMIN — SODIUM CHLORIDE, POTASSIUM CHLORIDE, SODIUM LACTATE AND CALCIUM CHLORIDE 250 ML/HR: 600; 310; 30; 20 INJECTION, SOLUTION INTRAVENOUS at 20:43

## 2021-01-26 RX ADMIN — SODIUM CHLORIDE, POTASSIUM CHLORIDE, SODIUM LACTATE AND CALCIUM CHLORIDE 250 ML/HR: 600; 310; 30; 20 INJECTION, SOLUTION INTRAVENOUS at 10:24

## 2021-01-26 RX ADMIN — HYDROMORPHONE HYDROCHLORIDE 1 MG: 1 INJECTION, SOLUTION INTRAMUSCULAR; INTRAVENOUS; SUBCUTANEOUS at 11:56

## 2021-01-26 RX ADMIN — HYDROMORPHONE HYDROCHLORIDE 1 MG: 1 INJECTION, SOLUTION INTRAMUSCULAR; INTRAVENOUS; SUBCUTANEOUS at 03:17

## 2021-01-26 RX ADMIN — HYDROMORPHONE HYDROCHLORIDE 1 MG: 1 INJECTION, SOLUTION INTRAMUSCULAR; INTRAVENOUS; SUBCUTANEOUS at 08:06

## 2021-01-26 RX ADMIN — Medication 10 ML: at 22:14

## 2021-01-26 RX ADMIN — HYDROMORPHONE HYDROCHLORIDE 1 MG: 1 INJECTION, SOLUTION INTRAMUSCULAR; INTRAVENOUS; SUBCUTANEOUS at 20:27

## 2021-01-26 NOTE — PROGRESS NOTES
Hospitalist Progress Note    NAME: Gypsy Osorio   :  1980   MRN:  [id number]     I reviewed pertinent labs/imaging and discussed plan of care with Dr. Nathen Calloway who is in agreement. Assessment / Plan:  Abdominal pain  Obstructive jaundice s/p ERCP with biliary sphincterotomy, biliary stone removal and balloon sphincteroplasty on   Biliary colic  Post ERCP pancreatitis  Gastritis  CT abdomen/pelvis 21:    Bibasilar subsegmental atelectasis. Gastritis. Fluid surrounding the pancreas and duodenum and biliary system. MRCP 21:  8 mm choledocholithiasis with 8 mm common bile duct dilation. Gallbladder sludge. - GI input appreciated. - Surgery input appreciated. - Lipase remains >3K.  - LFTs/total bili continue to improve. - Keep npo for now. - Continue IVF. - Continue famotidine 20 mg IV q12h. - Continue levofloxacin 500 mg IV q24h x 3 days. - Continue analgesics prn.    - Monitor LFTs and lipase daily. Leucocytosis   Urine culture 21:  <1,000 CFU/ML.  - Low grade fever.  - Check procalcitonin in a.m. Hypokalemia   - Replete. - Monitor. Anemia   - H/H stable over past 48h. - No tx indicated at this time. - monitor. 30.0 - 39.9 Obese / Body mass index is 33.73 kg/m². Code status: Full  Prophylaxis: SCDs, LMWH stopped 2/2 gastritis and anemia   Recommended Disposition: Home w/Family     Subjective:     Chief Complaint / Reason for Physician Visit  Continues to complain of abdominal pain. No N/V. Plan of care and pertinent events reviewed with bedside nurse.       Review of Systems:  Symptom Y/N Comments  Symptom Y/N Comments   Fever/Chills Y Low grade  Chest Pain N    Poor Appetite  NPO  Edema N    Cough N   Abdominal Pain Y    Sputum N   Joint Pain N    SOB/COREA N   Pruritis/Rash N    Nausea/vomit N   Tolerating PT/OT     Diarrhea N   Tolerating Diet  NPO   Constipation N   Other       Could NOT obtain due to:      Objective: VITALS:   Last 24hrs VS reviewed since prior progress note. Most recent are:  Patient Vitals for the past 24 hrs:   Temp Pulse Resp BP SpO2   01/26/21 1519 99.6 °F (37.6 °C) (!) 106 17 113/73 94 %   01/26/21 1206 100.1 °F (37.8 °C) 99 18 116/65 94 %   01/26/21 0836 99.7 °F (37.6 °C) (!) 103 17 110/76 90 %   01/26/21 0429 99.5 °F (37.5 °C) 89 18 109/62 92 %   01/26/21 0017 99.4 °F (37.4 °C) 100 18 127/68 95 %   01/25/21 2053 98.3 °F (36.8 °C) 88 18 139/87 94 %   01/25/21 1932 98.5 °F (36.9 °C) 80 20 121/76 91 %       Intake/Output Summary (Last 24 hours) at 1/26/2021 1843  Last data filed at 1/26/2021 1726  Gross per 24 hour   Intake 3075 ml   Output 1200 ml   Net 1875 ml        I had a face to face encounter and independently examined this patient on 1/26/2021, as outlined below:  PHYSICAL EXAM:  General:  A/A/O X 3. NAD. HEENT:  Normocephalic. Sclera anicteric. EOMI. Mucous membranes moist.    Chest:  Resps even/unlabored with symmetrical CWE. Air entry full. Lungs CTA. No use of accessory muscles. CV:  RRR. Normal S1/S2. No M/C/R appreciated. No JVD. No peripheral edema. GI:  Abdomen soft/distended. Diffusely TTP. No hernia. ABT X 4.    :  Voiding. Neurologic:  Nonfocal.  CN II-XII grossly intact. Face symmetrical.  Speech normal.     Psych:  Cooperative. No anxiety or agitation. No suicidal/homicidal ideation. Skin:  No rashes or jaundice.       Reviewed most current lab test results and cultures  YES  Reviewed most current radiology test results   YES  Review and summation of old records today    NO  Reviewed patient's current orders and MAR    YES  PMH/SH reviewed - no change compared to H&P  ________________________________________________________________________  Care Plan discussed with:    Comments   Patient [address]     Consultant                        Multidiciplinary team rounds were held today with , nursing, pharmacist and clinical coordinator. Patient's plan of care was discussed; medications were reviewed and discharge planning was addressed. ________________________________________________________________________  Freda Ortiz NP     Procedures: see electronic medical records for all procedures/Xrays and details which were not copied into this note but were reviewed prior to creation of Plan. LABS:  I reviewed today's most current labs and imaging studies.   Pertinent labs include:  Recent Labs     01/26/21 0330 01/25/21 0206 01/24/21 0312   WBC 14.6* 8.2 3.8   HGB 10.9* 10.3* 9.5*   HCT 33.3* 32.4* 30.0*    282 235     Recent Labs     01/26/21 0330 01/25/21 0206 01/24/21 0312    137 140   K 3.3* 3.7 3.7    104 107   CO2 26 26 28   GLU 67 111* 81   BUN 9 9 5*   CREA 0.55 0.49* 0.50*   CA 8.0* 8.4* 8.7   ALB 2.5* 2.6* 2.7*   TBILI 1.8* 2.7* 2.9*   ALT 90* 124* 123*       Signed: Freda Ortiz NP

## 2021-01-26 NOTE — PROGRESS NOTES
Gastroenterology Progress Note  HEIKE Swenson   for Dr. Panchito Lee    1/26/2021    Admit Date: 1/22/2021    Subjective: Follow up for: Obstructive jaundice,RUQ pain, post ERCP pancreatitis      Patient was seen in rounds by me today. Sitting in bedside chair and reports doing a little better today. More abdominal bloating. Feeling like she needs to pass gas but not able to do so. Denies any BM however remains NPO. No F/C. Irine output improved from yesterday per pt. Pain is mainly on left side of her abdomen, 4/10. LFTs decreasing, awaiting repeat lipase. WBC increased to 14.6 today  Results for Arturo Blush (MRN [id number]) as of 1/26/2021 08:33   Ref. Range 1/25/2021 02:06 1/26/2021 03:30   ALT Latest Ref Range: 12 - 78 U/L 124 (H) 90 (H)   AST Latest Ref Range: 15 - 37 U/L 92 (H) 63 (H)   Alk. phosphatase Latest Ref Range: 45 - 117 U/L 629 (H) 524 (H)   Results for Arturo Blush (MRN [id number]) as of 1/26/2021 08:33   Ref.  Range 1/25/2021 02:06 1/26/2021 03:30   Bilirubin, total Latest Ref Range: 0.2 - 1.0 MG/DL 2.7 (H) 1.8 (H)       Current Facility-Administered Medications   Medication Dose Route Frequency    potassium chloride 10 mEq in 100 ml IVPB  10 mEq IntraVENous Q1H    HYDROmorphone (PF) (DILAUDID) injection 1 mg  1 mg IntraVENous Q2H PRN    sodium chloride (NS) flush 5-40 mL  5-40 mL IntraVENous Q8H    sodium chloride (NS) flush 5-40 mL  5-40 mL IntraVENous PRN    sodium chloride (NS) flush 5-40 mL  5-40 mL IntraVENous Q8H    sodium chloride (NS) flush 5-40 mL  5-40 mL IntraVENous PRN    lactated Ringers infusion  25 mL/hr IntraVENous CONTINUOUS    sodium chloride (NS) flush 5-40 mL  5-40 mL IntraVENous Q8H    sodium chloride (NS) flush 5-40 mL  5-40 mL IntraVENous PRN    fentaNYL citrate (PF) injection 12.5 mcg  12.5 mcg IntraVENous Q6H PRN    famotidine (PF) (PEPCID) 20 mg in 0.9% sodium chloride 10 mL injection  20 mg IntraVENous Q12H    hydrOXYzine HCL (ATARAX) tablet 25 mg  25 mg Oral TID PRN    levoFLOXacin (LEVAQUIN) 500 mg in D5W IVPB  500 mg IntraVENous Q24H    sodium chloride (NS) flush 5-40 mL  5-40 mL IntraVENous Q8H    sodium chloride (NS) flush 5-40 mL  5-40 mL IntraVENous PRN    acetaminophen (TYLENOL) tablet 650 mg  650 mg Oral Q6H PRN    Or    acetaminophen (TYLENOL) suppository 650 mg  650 mg Rectal Q6H PRN    polyethylene glycol (MIRALAX) packet 17 g  17 g Oral DAILY PRN    promethazine (PHENERGAN) tablet 12.5 mg  12.5 mg Oral Q6H PRN    Or    ondansetron (ZOFRAN) injection 4 mg  4 mg IntraVENous Q6H PRN    lactated Ringers infusion  250 mL/hr IntraVENous CONTINUOUS        Objective:     Blood pressure 109/62, pulse 89, temperature 99.5 °F (37.5 °C), resp. rate 18, height 5' 6\" (1.676 m), weight 94.8 kg (209 lb), last menstrual period 01/21/2021, SpO2 92 %. No intake/output data recorded.     01/24 1901 - 01/26 0700  In: 9175.8 [I.V.:9175.8]  Out: -         Physical Examination:       General:Pleasent female in NAD,  HEENT:  NCAT,  Chest:  CTA,   Heart: S1, S2, RRR  GI: Soft, LUQ and left mid abdominal tenderness without guarding or rebound, normal BS  Extremities: No edema   CNS: CNs grossly normal.    Data Review    Recent Results (from the past 24 hour(s))   CBC W/O DIFF    Collection Time: 01/26/21  3:30 AM   Result Value Ref Range    WBC 14.6 (H) 3.6 - 11.0 K/uL    RBC 3.85 3.80 - 5.20 M/uL    HGB 10.9 (L) 11.5 - 16.0 g/dL    HCT 33.3 (L) 35.0 - 47.0 %    MCV 86.5 80.0 - 99.0 FL    MCH 28.3 26.0 - 34.0 PG    MCHC 32.7 30.0 - 36.5 g/dL    RDW 14.6 (H) 11.5 - 14.5 %    PLATELET 834 726 - 453 K/uL    MPV 12.8 8.9 - 12.9 FL    NRBC 0.0 0  WBC    ABSOLUTE NRBC 0.00 0.00 - 8.16 K/uL   METABOLIC PANEL, COMPREHENSIVE    Collection Time: 01/26/21  3:30 AM   Result Value Ref Range    Sodium 137 136 - 145 mmol/L    Potassium 3.3 (L) 3.5 - 5.1 mmol/L    Chloride 104 97 - 108 mmol/L    CO2 26 21 - 32 mmol/L    Anion gap 7 5 - 15 mmol/L    Glucose 67 65 - 100 mg/dL    BUN 9 6 - 20 MG/DL    Creatinine 0.55 0.55 - 1.02 MG/DL    BUN/Creatinine ratio 16 12 - 20      GFR est AA >60 >60 ml/min/1.73m2    GFR est non-AA >60 >60 ml/min/1.73m2    Calcium 8.0 (L) 8.5 - 10.1 MG/DL    Bilirubin, total 1.8 (H) 0.2 - 1.0 MG/DL    ALT (SGPT) 90 (H) 12 - 78 U/L    AST (SGOT) 63 (H) 15 - 37 U/L    Alk. phosphatase 524 (H) 45 - 117 U/L    Protein, total 5.8 (L) 6.4 - 8.2 g/dL    Albumin 2.5 (L) 3.5 - 5.0 g/dL    Globulin 3.3 2.0 - 4.0 g/dL    A-G Ratio 0.8 (L) 1.1 - 2.2       Recent Labs     01/26/21  0330 01/25/21  0206   WBC 14.6* 8.2   HGB 10.9* 10.3*   HCT 33.3* 32.4*    282     Recent Labs     01/26/21  0330 01/25/21  0206 01/24/21 0312    137 140   K 3.3* 3.7 3.7    104 107   CO2 26 26 28   BUN 9 9 5*   CREA 0.55 0.49* 0.50*   GLU 67 111* 81   CA 8.0* 8.4* 8.7     Recent Labs     01/26/21  0330 01/25/21  0206 01/24/21  1651 01/24/21  0312   * 629*  --  692*   TP 5.8* 6.1*  --  6.3*   ALB 2.5* 2.6*  --  2.7*   GLOB 3.3 3.5  --  3.6   AML  --   --  >1,300*  --    LPSE  --  >3,000* >3,000*  --      No results for input(s): INR, PTP, APTT, INREXT, INREXT in the last 72 hours. No results for input(s): FE, TIBC, PSAT, FERR in the last 72 hours. No results found for: FOL, RBCF   No results for input(s): PH, PCO2, PO2 in the last 72 hours. No results for input(s): CPK, CKNDX, TROIQ in the last 72 hours.     No lab exists for component: CPKMB  Lab Results   Component Value Date/Time    Cholesterol, total 185 10/27/2020 10:20 AM    HDL Cholesterol 44 10/27/2020 10:20 AM    LDL, calculated 112.4 (H) 10/27/2020 10:20 AM    Triglyceride 143 10/27/2020 10:20 AM    CHOL/HDL Ratio 4.2 10/27/2020 10:20 AM     No components found for: [place]  Lab Results   Component Value Date/Time    Color DARK YELLOW 01/22/2021 07:34 PM    Appearance CLOUDY (A) 01/22/2021 07:34 PM    Specific gravity 1.025 01/22/2021 07:34 PM    pH (UA) 6.5 01/22/2021 07:34 PM    Protein 30 (A) 01/22/2021 07:34 PM    Glucose Negative 01/22/2021 07:34 PM    Ketone 40 (A) 01/22/2021 07:34 PM    Urobilinogen 2.0 (H) 01/22/2021 07:34 PM    Nitrites Negative 01/22/2021 07:34 PM    Leukocyte Esterase SMALL (A) 01/22/2021 07:34 PM    Epithelial cells FEW 01/22/2021 07:34 PM    Bacteria Negative 01/22/2021 07:34 PM    WBC 10-20 01/22/2021 07:34 PM    RBC >100 (H) 01/22/2021 07:34 PM        ROS: -CP, SOB, Dysuria, palpitations, cough. Assessment:    Post ERCP pancreatitis  Elevated LFTS  Abnormal MRCP    Active Problems:    Obstructive jaundice (1/22/2021)             Plan/Discussion:     · Continue aggressive IVF resuscitation,  · Keep NPO fpr now, awaiting repeat lipase. May see how she is doing later today as she still has abdominal pain. · Development of leukocytosis. Continue Levaquin, if WBC continues to increase consider more broad spectrum abx.    · Continue pain management   · Daily LFTs and lipase  · Encouraged ambulation as tolerated        HEIKE Schultz  01/26/21  8:37 AM

## 2021-01-26 NOTE — PROGRESS NOTES
End of Shift Note    Bedside shift change report given to Jose Reddy (oncoming nurse) by Cynthia Watson (offgoing nurse). Report included the following information SBAR, Kardex, Intake/Output, MAR and Recent Results    Shift worked:  7p-7a     Shift summary and any significant changes:     Pt rested in bed and recliner. Pain medicine given PRN throughout night. Ambulated to bathroom. Concerns for physician to address:  none     Zone phone for oncoming shift:   [phone]       Activity:  Activity Level: Up with Assistance  Number times ambulated in hallways past shift: 0  Number of times OOB to chair past shift: 1    Cardiac:   Cardiac Monitoring: No      Cardiac Rhythm: Normal sinus rhythm, Sinus bradycardia    Access:   Current line(s): PIV     Genitourinary:   Urinary status: voiding    Respiratory:   O2 Device: Room air  Chronic home O2 use?: NO  Incentive spirometer at bedside: NO     GI:  Last Bowel Movement Date: 01/22/21  Current diet:  DIET NPO With Ice Chips  Passing flatus: YES  Tolerating current diet: YES       Pain Management:   Patient states pain is manageable on current regimen: YES    Skin:  Daniel Score: (P) 22  Interventions: increase time out of bed    Patient Safety:  Fall Score:  Total Score: 2  Interventions: gripper socks       Length of Stay:  Expected LOS: 4d 9h  Actual LOS: [address]

## 2021-01-26 NOTE — PROGRESS NOTES
0800- Spoke to [employer] in the lab. States she can add on lipase to sample already in lab. End of Shift Note    Bedside shift change report given to Divine  (oncoming nurse) by Gris Graff (offgoing nurse). Report included the following information SBAR, Kardex, Procedure Summary, Intake/Output, MAR and Recent Results    Shift worked: 7a-7p   Shift summary and any significant changes:     Pt medicated for pain throughout shift. Ambulated in room frequently and up to recliner. Voiding thor urine. Remains NPO. Concerns for physician to address:       Zone phone for oncoming shift:          Activity:  Activity Level: Up ad herb  Number times ambulated in hallways past shift: 0  Number of times OOB to chair past shift: 4    Cardiac:   Cardiac Monitoring: No      Cardiac Rhythm: Normal sinus rhythm    Access:   Current line(s): PIV     Genitourinary:   Urinary status: voiding    Respiratory:   O2 Device: Room air  Chronic home O2 use?: NO  Incentive spirometer at bedside: N/A     GI:  Last Bowel Movement Date: 01/22/21  Current diet:  DIET NPO With Ice Chips  Passing flatus: YES  Tolerating current diet: YES  % Diet Eaten: 0 %    Pain Management:   Patient states pain is manageable on current regimen: YES    Skin:  Daniel Score: (P) 22  Interventions: increase time out of bed    Patient Safety:  Fall Score:  Total Score: 2  Interventions: gripper socks       Length of Stay:  Expected LOS: 4d 9h  Actual LOS: [address] [facility]

## 2021-01-26 NOTE — PROGRESS NOTES
GI note(Courtesy visit):    Plan and labs/vitals d/w pt and  in detail. Rolo Hurtado a diagram to explain in detail what possibly may have happened and how pancreatitis can develop post ERCP despite no pancreatic manipulation. All questions answered. They remain grateful and optimistic. Agree with current aggressive treatment. Discussed mild ,moderate and severe pancreatitis in detail. Dr Harsha Avila roundjeri for our group.       SMA Pia MD

## 2021-01-27 ENCOUNTER — APPOINTMENT (OUTPATIENT)
Dept: GENERAL RADIOLOGY | Age: 41
DRG: 444 | End: 2021-01-27
Attending: NURSE PRACTITIONER

## 2021-01-27 LAB
ALBUMIN SERPL-MCNC: 2.1 G/DL (ref 3.5–5)
ALBUMIN/GLOB SERPL: 0.7 {RATIO} (ref 1.1–2.2)
ALP SERPL-CCNC: 386 U/L (ref 45–117)
ALT SERPL-CCNC: 56 U/L (ref 12–78)
ANION GAP SERPL CALC-SCNC: 7 MMOL/L (ref 5–15)
AST SERPL-CCNC: 39 U/L (ref 15–37)
BASOPHILS # BLD: 0 K/UL (ref 0–0.1)
BASOPHILS NFR BLD: 0 % (ref 0–1)
BILIRUB SERPL-MCNC: 1.7 MG/DL (ref 0.2–1)
BUN SERPL-MCNC: 4 MG/DL (ref 6–20)
BUN/CREAT SERPL: 13 (ref 12–20)
CALCIUM SERPL-MCNC: 7.6 MG/DL (ref 8.5–10.1)
CHLORIDE SERPL-SCNC: 100 MMOL/L (ref 97–108)
CO2 SERPL-SCNC: 27 MMOL/L (ref 21–32)
CREAT SERPL-MCNC: 0.32 MG/DL (ref 0.55–1.02)
DIFFERENTIAL METHOD BLD: ABNORMAL
EOSINOPHIL # BLD: 0 K/UL (ref 0–0.4)
EOSINOPHIL NFR BLD: 0 % (ref 0–7)
ERYTHROCYTE [DISTWIDTH] IN BLOOD BY AUTOMATED COUNT: 14.6 % (ref 11.5–14.5)
GLOBULIN SER CALC-MCNC: 3 G/DL (ref 2–4)
GLUCOSE SERPL-MCNC: 52 MG/DL (ref 65–100)
HCT VFR BLD AUTO: 29.8 % (ref 35–47)
HGB BLD-MCNC: 9.8 G/DL (ref 11.5–16)
IMM GRANULOCYTES # BLD AUTO: 0.1 K/UL (ref 0–0.04)
IMM GRANULOCYTES NFR BLD AUTO: 1 % (ref 0–0.5)
LIPASE SERPL-CCNC: 2156 U/L (ref 73–393)
LYMPHOCYTES # BLD: 1 K/UL (ref 0.8–3.5)
LYMPHOCYTES NFR BLD: 6 % (ref 12–49)
MAGNESIUM SERPL-MCNC: 1.4 MG/DL (ref 1.6–2.4)
MCH RBC QN AUTO: 27.9 PG (ref 26–34)
MCHC RBC AUTO-ENTMCNC: 32.9 G/DL (ref 30–36.5)
MCV RBC AUTO: 84.9 FL (ref 80–99)
MONOCYTES # BLD: 1.2 K/UL (ref 0–1)
MONOCYTES NFR BLD: 7 % (ref 5–13)
NEUTS SEG # BLD: 13.9 K/UL (ref 1.8–8)
NEUTS SEG NFR BLD: 86 % (ref 32–75)
NRBC # BLD: 0 K/UL (ref 0–0.01)
NRBC BLD-RTO: 0 PER 100 WBC
PHOSPHATE SERPL-MCNC: 2.7 MG/DL (ref 2.6–4.7)
PLATELET # BLD AUTO: 248 K/UL (ref 150–400)
PMV BLD AUTO: 11.5 FL (ref 8.9–12.9)
POTASSIUM SERPL-SCNC: 3.2 MMOL/L (ref 3.5–5.1)
PROCALCITONIN SERPL-MCNC: 0.13 NG/ML
PROT SERPL-MCNC: 5.1 G/DL (ref 6.4–8.2)
RBC # BLD AUTO: 3.51 M/UL (ref 3.8–5.2)
SODIUM SERPL-SCNC: 134 MMOL/L (ref 136–145)
WBC # BLD AUTO: 16.2 K/UL (ref 3.6–11)

## 2021-01-27 PROCEDURE — 74011250636 HC RX REV CODE- 250/636: Performed by: INTERNAL MEDICINE

## 2021-01-27 PROCEDURE — 74011000250 HC RX REV CODE- 250: Performed by: INTERNAL MEDICINE

## 2021-01-27 PROCEDURE — 77010033678 HC OXYGEN DAILY

## 2021-01-27 PROCEDURE — 84100 ASSAY OF PHOSPHORUS: CPT

## 2021-01-27 PROCEDURE — 74011250637 HC RX REV CODE- 250/637: Performed by: STUDENT IN AN ORGANIZED HEALTH CARE EDUCATION/TRAINING PROGRAM

## 2021-01-27 PROCEDURE — 85025 COMPLETE CBC W/AUTO DIFF WBC: CPT

## 2021-01-27 PROCEDURE — 94760 N-INVAS EAR/PLS OXIMETRY 1: CPT

## 2021-01-27 PROCEDURE — 77030027138 HC INCENT SPIROMETER -A

## 2021-01-27 PROCEDURE — 2709999900 HC NON-CHARGEABLE SUPPLY

## 2021-01-27 PROCEDURE — 71045 X-RAY EXAM CHEST 1 VIEW: CPT

## 2021-01-27 PROCEDURE — 74011250636 HC RX REV CODE- 250/636: Performed by: NURSE PRACTITIONER

## 2021-01-27 PROCEDURE — 36415 COLL VENOUS BLD VENIPUNCTURE: CPT

## 2021-01-27 PROCEDURE — 80053 COMPREHEN METABOLIC PANEL: CPT

## 2021-01-27 PROCEDURE — 83690 ASSAY OF LIPASE: CPT

## 2021-01-27 PROCEDURE — 65270000029 HC RM PRIVATE

## 2021-01-27 PROCEDURE — 84145 PROCALCITONIN (PCT): CPT

## 2021-01-27 PROCEDURE — 83735 ASSAY OF MAGNESIUM: CPT

## 2021-01-27 RX ORDER — POTASSIUM CHLORIDE 7.45 MG/ML
10 INJECTION INTRAVENOUS
Status: COMPLETED | OUTPATIENT
Start: 2021-01-27 | End: 2021-01-27

## 2021-01-27 RX ORDER — MAGNESIUM SULFATE HEPTAHYDRATE 40 MG/ML
2 INJECTION, SOLUTION INTRAVENOUS ONCE
Status: COMPLETED | OUTPATIENT
Start: 2021-01-27 | End: 2021-01-27

## 2021-01-27 RX ADMIN — Medication 10 ML: at 22:02

## 2021-01-27 RX ADMIN — POTASSIUM CHLORIDE 10 MEQ: 10 INJECTION, SOLUTION INTRAVENOUS at 08:20

## 2021-01-27 RX ADMIN — HYDROMORPHONE HYDROCHLORIDE 1 MG: 1 INJECTION, SOLUTION INTRAMUSCULAR; INTRAVENOUS; SUBCUTANEOUS at 15:05

## 2021-01-27 RX ADMIN — HYDROMORPHONE HYDROCHLORIDE 1 MG: 1 INJECTION, SOLUTION INTRAMUSCULAR; INTRAVENOUS; SUBCUTANEOUS at 17:44

## 2021-01-27 RX ADMIN — HYDROMORPHONE HYDROCHLORIDE 1 MG: 1 INJECTION, SOLUTION INTRAMUSCULAR; INTRAVENOUS; SUBCUTANEOUS at 01:48

## 2021-01-27 RX ADMIN — SODIUM CHLORIDE, POTASSIUM CHLORIDE, SODIUM LACTATE AND CALCIUM CHLORIDE 125 ML/HR: 600; 310; 30; 20 INJECTION, SOLUTION INTRAVENOUS at 20:28

## 2021-01-27 RX ADMIN — HYDROMORPHONE HYDROCHLORIDE 1 MG: 1 INJECTION, SOLUTION INTRAMUSCULAR; INTRAVENOUS; SUBCUTANEOUS at 08:14

## 2021-01-27 RX ADMIN — HYDROMORPHONE HYDROCHLORIDE 1 MG: 1 INJECTION, SOLUTION INTRAMUSCULAR; INTRAVENOUS; SUBCUTANEOUS at 11:33

## 2021-01-27 RX ADMIN — SODIUM CHLORIDE, POTASSIUM CHLORIDE, SODIUM LACTATE AND CALCIUM CHLORIDE 250 ML/HR: 600; 310; 30; 20 INJECTION, SOLUTION INTRAVENOUS at 07:15

## 2021-01-27 RX ADMIN — HYDROMORPHONE HYDROCHLORIDE 1 MG: 1 INJECTION, SOLUTION INTRAMUSCULAR; INTRAVENOUS; SUBCUTANEOUS at 04:50

## 2021-01-27 RX ADMIN — LEVOFLOXACIN 500 MG: 5 INJECTION, SOLUTION INTRAVENOUS at 15:08

## 2021-01-27 RX ADMIN — POTASSIUM CHLORIDE 10 MEQ: 10 INJECTION, SOLUTION INTRAVENOUS at 11:33

## 2021-01-27 RX ADMIN — Medication 10 ML: at 15:05

## 2021-01-27 RX ADMIN — POTASSIUM CHLORIDE 10 MEQ: 10 INJECTION, SOLUTION INTRAVENOUS at 09:55

## 2021-01-27 RX ADMIN — POTASSIUM CHLORIDE 10 MEQ: 10 INJECTION, SOLUTION INTRAVENOUS at 07:16

## 2021-01-27 RX ADMIN — MAGNESIUM SULFATE HEPTAHYDRATE 2 G: 40 INJECTION, SOLUTION INTRAVENOUS at 12:10

## 2021-01-27 RX ADMIN — SODIUM CHLORIDE, POTASSIUM CHLORIDE, SODIUM LACTATE AND CALCIUM CHLORIDE 250 ML/HR: 600; 310; 30; 20 INJECTION, SOLUTION INTRAVENOUS at 12:10

## 2021-01-27 RX ADMIN — FAMOTIDINE 20 MG: 10 INJECTION INTRAVENOUS at 21:56

## 2021-01-27 RX ADMIN — HYDROMORPHONE HYDROCHLORIDE 1 MG: 1 INJECTION, SOLUTION INTRAMUSCULAR; INTRAVENOUS; SUBCUTANEOUS at 22:49

## 2021-01-27 RX ADMIN — FAMOTIDINE 20 MG: 10 INJECTION INTRAVENOUS at 11:33

## 2021-01-27 RX ADMIN — SODIUM CHLORIDE, POTASSIUM CHLORIDE, SODIUM LACTATE AND CALCIUM CHLORIDE 250 ML/HR: 600; 310; 30; 20 INJECTION, SOLUTION INTRAVENOUS at 01:48

## 2021-01-27 RX ADMIN — Medication 10 ML: at 06:13

## 2021-01-27 RX ADMIN — ACETAMINOPHEN 650 MG: 325 TABLET ORAL at 16:46

## 2021-01-27 NOTE — PROGRESS NOTES
Hospitalist Progress Note    NAME: Kesha Ball   :  1980   MRN:  [id number]     I reviewed pertinent labs/imaging and discussed plan of care with Dr. Reggie Ferrer who is in agreement. Assessment / Plan:  Abdominal pain  Obstructive jaundice s/p ERCP with biliary sphincterotomy, biliary stone removal and balloon sphincteroplasty on   Biliary colic  Post ERCP pancreatitis  Gastritis  CT abdomen/pelvis 21:    Bibasilar subsegmental atelectasis. Gastritis. Fluid surrounding the pancreas and duodenum and biliary system. MRCP 21:  8 mm choledocholithiasis with 8 mm common bile duct dilation. Gallbladder sludge. - GI input appreciated. - Surgery input appreciated. - Lipase down today to 2156.  - LFTs/total bili continue to improve. - Keep npo for now. - Continue IVF, d/c conflicting rates - change LR at 125 cc/hr. - Continue famotidine 20 mg IV q12h. - Continue levofloxacin 500 mg IV q24h. - Continue analgesics prn.    - Monitor LFTs and lipase daily. Leucocytosis   Urine culture 21:  <1,000 CFU/ML. CXR 21:  Slightly diminished lung volumes and left basilar haziness may represent effusion or atelectasis. No focal consolidation.  - Continues to have low grade fever 24h Tmax 100  - Procalcitonin low at 0.13  - Continue IS.  - Encourage ambulation.  - Abx as noted above. Mild asymptomatic hyponatremia   Hypokalemia   Hypomagnesemia   - Replete K+ with KCl 40 mEq IV.  - Replete Mg++ with magnesium sulfate 2 gm IV.  - Monitor. Anemia   - H/H overall stable. - No tx indicated at this time. - monitor. 30.0 - 39.9 Obese / Body mass index is 33.73 kg/m². Code status: Full  Prophylaxis: SCDs, LMWH stopped 2/2 gastritis and anemia   Recommended Disposition: Home w/Family     Subjective:     Chief Complaint / Reason for Physician Visit  Continues to complain of abdominal pain. No N/V.   Plan of care and pertinent events reviewed with bedside nurse. Review of Systems:  Symptom Y/N Comments  Symptom Y/N Comments   Fever/Chills Y Low grade  Chest Pain N    Poor Appetite  NPO  Edema N    Cough N   Abdominal Pain Y    Sputum N   Joint Pain N    SOB/COREA N   Pruritis/Rash N    Nausea/vomit N   Tolerating PT/OT     Diarrhea N   Tolerating Diet  NPO   Constipation N   Other       Could NOT obtain due to:      Objective:     VITALS:   Last 24hrs VS reviewed since prior progress note. Most recent are:  Patient Vitals for the past 24 hrs:   Temp Pulse Resp BP SpO2   01/27/21 1136 100 °F (37.8 °C) (!) 102 18 103/65 97 %   01/27/21 0758 99.3 °F (37.4 °C) (!) 104 19 (!) 122/92 95 %   01/27/21 0315 99.8 °F (37.7 °C) (!) 104 17 115/74 95 %   01/26/21 2341 -- (!) 101 15 -- 94 %   01/26/21 2340 99.8 °F (37.7 °C) (!) 110 17 109/68 (!) 85 %   01/26/21 2024 100 °F (37.8 °C) (!) 103 17 114/63 93 %   01/26/21 1519 99.6 °F (37.6 °C) (!) 106 17 113/73 94 %       Intake/Output Summary (Last 24 hours) at 1/27/2021 1336  Last data filed at 1/27/2021 1140  Gross per 24 hour   Intake 3100 ml   Output 4325 ml   Net -1225 ml        I had a face to face encounter and independently examined this patient on 1/27/2021, as outlined below:  PHYSICAL EXAM:  General:  A/A/O X 3. NAD. HEENT:  Normocephalic. Sclera anicteric. EOMI. Mucous membranes moist.    Chest:  Resps even/unlabored with symmetrical CWE. Air entry full. Lungs CTA. No use of accessory muscles. CV:  RRR. Normal S1/S2. No M/C/R appreciated. No JVD. No peripheral edema. GI:  Abdomen soft/distended. Diffusely TTP with increased TTP over RUQ. No hernia. ABT X 4.    :  Voiding. Neurologic:  Nonfocal.  CN II-XII grossly intact. Face symmetrical.  Speech normal.     Psych:  Cooperative. No anxiety or agitation. No suicidal/homicidal ideation. Skin:  No rashes or jaundice.       Reviewed most current lab test results and cultures  YES  Reviewed most current radiology test results YES  Review and summation of old records today    NO  Reviewed patient's current orders and MAR    YES  PMH/SH reviewed - no change compared to H&P  ________________________________________________________________________  Care Plan discussed with:    Comments   Patient [address]     Consultant                        Multidiciplinary team rounds were held today with , nursing, pharmacist and clinical coordinator. Patient's plan of care was discussed; medications were reviewed and discharge planning was addressed. ________________________________________________________________________  Thierry Yao NP     Procedures: see electronic medical records for all procedures/Xrays and details which were not copied into this note but were reviewed prior to creation of Plan. LABS:  I reviewed today's most current labs and imaging studies.   Pertinent labs include:  Recent Labs     01/27/21  0226 01/26/21  0330 01/25/21  0206   WBC 16.2* 14.6* 8.2   HGB 9.8* 10.9* 10.3*   HCT 29.8* 33.3* 32.4*    306 282     Recent Labs     01/27/21  0226 01/26/21  0330 01/25/21  0206   * 137 137   K 3.2* 3.3* 3.7    104 104   CO2 27 26 26   GLU 52* 67 111*   BUN 4* 9 9   CREA 0.32* 0.55 0.49*   CA 7.6* 8.0* 8.4*   MG 1.4*  --   --    PHOS 2.7  --   --    ALB 2.1* 2.5* 2.6*   TBILI 1.7* 1.8* 2.7*   ALT 56 90* 124*       Signed: Thierry Yao NP

## 2021-01-27 NOTE — PROGRESS NOTES
Received message from patient's nurse Loki Aquino stating :    Patients vitals signs just now show low O2 ranging from 85-90 on RA, patient now placed on 2LNC, O2 is now 94%. Discussion / orders:    · Chest x-ray  · Respiratory care consult         Please note that this note was dictated using Dragon computer voice recognition software. Quite often unanticipated grammatical, syntax, homophones, and other interpretive errors are inadvertently transcribed by the computer software. Please disregard these errors. Please excuse any errors that have escaped final proofreading.

## 2021-01-27 NOTE — PROGRESS NOTES
Gastroenterology Progress Note  HEIKE Calvo   for Dr. Jasmine Dominguez    1/27/2021    Admit Date: 1/22/2021    Subjective: Follow up for: Obstructive jaundice, RUQ pain, post ERCP pancreatitis      Patient was seen in rounds by me today. Had a rough night last night with pain, LUQ radiating to the back, but a little better this AM. 6/10. Remains NPO. Tm 100. LFTs and lipase improving but  WBC increased       Current Facility-Administered Medications   Medication Dose Route Frequency    potassium chloride 10 mEq in 100 ml IVPB  10 mEq IntraVENous Q1H    magnesium sulfate 2 g/50 ml IVPB (premix or compounded)  2 g IntraVENous ONCE    HYDROmorphone (PF) (DILAUDID) injection 1 mg  1 mg IntraVENous Q2H PRN    sodium chloride (NS) flush 5-40 mL  5-40 mL IntraVENous Q8H    sodium chloride (NS) flush 5-40 mL  5-40 mL IntraVENous Q8H    lactated Ringers infusion  25 mL/hr IntraVENous CONTINUOUS    sodium chloride (NS) flush 5-40 mL  5-40 mL IntraVENous Q8H    fentaNYL citrate (PF) injection 12.5 mcg  12.5 mcg IntraVENous Q6H PRN    famotidine (PF) (PEPCID) 20 mg in 0.9% sodium chloride 10 mL injection  20 mg IntraVENous Q12H    hydrOXYzine HCL (ATARAX) tablet 25 mg  25 mg Oral TID PRN    levoFLOXacin (LEVAQUIN) 500 mg in D5W IVPB  500 mg IntraVENous Q24H    sodium chloride (NS) flush 5-40 mL  5-40 mL IntraVENous Q8H    sodium chloride (NS) flush 5-40 mL  5-40 mL IntraVENous PRN    acetaminophen (TYLENOL) tablet 650 mg  650 mg Oral Q6H PRN    Or    acetaminophen (TYLENOL) suppository 650 mg  650 mg Rectal Q6H PRN    polyethylene glycol (MIRALAX) packet 17 g  17 g Oral DAILY PRN    promethazine (PHENERGAN) tablet 12.5 mg  12.5 mg Oral Q6H PRN    Or    ondansetron (ZOFRAN) injection 4 mg  4 mg IntraVENous Q6H PRN    lactated Ringers infusion  250 mL/hr IntraVENous CONTINUOUS        Objective:     Blood pressure (!) 122/92, pulse (!) 104, temperature 99.3 °F (37.4 °C), resp.  rate 19, height 5' 6\" (1.676 m), weight 94.8 kg (209 lb), last menstrual period 01/21/2021, SpO2 95 %. No intake/output data recorded. 01/25 1901 - 01/27 0700  In: 6175 [I. C.:8733]  Out: 3725 [UNPRL:5591]        Physical Examination:       General: Pleasent female in NAD,  HEENT:  NCAT, no scleral icterus  Chest:  CTA,   Heart: S1, S2, RRR  GI: Soft, LUQ epigastric tenderness without guarding or rebound, normal BS  Extremities: No edema   CNS: CNs grossly normal.    Data Review    Recent Results (from the past 24 hour(s))   PROCALCITONIN    Collection Time: 01/27/21  2:26 AM   Result Value Ref Range    Procalcitonin 0.13 ng/mL   CBC WITH AUTOMATED DIFF    Collection Time: 01/27/21  2:26 AM   Result Value Ref Range    WBC 16.2 (H) 3.6 - 11.0 K/uL    RBC 3.51 (L) 3.80 - 5.20 M/uL    HGB 9.8 (L) 11.5 - 16.0 g/dL    HCT 29.8 (L) 35.0 - 47.0 %    MCV 84.9 80.0 - 99.0 FL    MCH 27.9 26.0 - 34.0 PG    MCHC 32.9 30.0 - 36.5 g/dL    RDW 14.6 (H) 11.5 - 14.5 %    PLATELET 895 433 - 845 K/uL    MPV 11.5 8.9 - 12.9 FL    NRBC 0.0 0  WBC    ABSOLUTE NRBC 0.00 0.00 - 0.01 K/uL    NEUTROPHILS 86 (H) 32 - 75 %    LYMPHOCYTES 6 (L) 12 - 49 %    MONOCYTES 7 5 - 13 %    EOSINOPHILS 0 0 - 7 %    BASOPHILS 0 0 - 1 %    IMMATURE GRANULOCYTES 1 (H) 0.0 - 0.5 %    ABS. NEUTROPHILS 13.9 (H) 1.8 - 8.0 K/UL    ABS. LYMPHOCYTES 1.0 0.8 - 3.5 K/UL    ABS. MONOCYTES 1.2 (H) 0.0 - 1.0 K/UL    ABS. EOSINOPHILS 0.0 0.0 - 0.4 K/UL    ABS. BASOPHILS 0.0 0.0 - 0.1 K/UL    ABS. IMM.  GRANS. 0.1 (H) 0.00 - 0.04 K/UL    DF AUTOMATED     MAGNESIUM    Collection Time: 01/27/21  2:26 AM   Result Value Ref Range    Magnesium 1.4 (L) 1.6 - 2.4 mg/dL   PHOSPHORUS    Collection Time: 01/27/21  2:26 AM   Result Value Ref Range    Phosphorus 2.7 2.6 - 4.7 MG/DL   METABOLIC PANEL, COMPREHENSIVE    Collection Time: 01/27/21  2:26 AM   Result Value Ref Range    Sodium 134 (L) 136 - 145 mmol/L    Potassium 3.2 (L) 3.5 - 5.1 mmol/L    Chloride 100 97 - 108 mmol/L    CO2 27 21 - 32 mmol/L    Anion gap 7 5 - 15 mmol/L    Glucose 52 (L) 65 - 100 mg/dL    BUN 4 (L) 6 - 20 MG/DL    Creatinine 0.32 (L) 0.55 - 1.02 MG/DL    BUN/Creatinine ratio 13 12 - 20      GFR est AA >60 >60 ml/min/1.73m2    GFR est non-AA >60 >60 ml/min/1.73m2    Calcium 7.6 (L) 8.5 - 10.1 MG/DL    Bilirubin, total 1.7 (H) 0.2 - 1.0 MG/DL    ALT (SGPT) 56 12 - 78 U/L    AST (SGOT) 39 (H) 15 - 37 U/L    Alk. phosphatase 386 (H) 45 - 117 U/L    Protein, total 5.1 (L) 6.4 - 8.2 g/dL    Albumin 2.1 (L) 3.5 - 5.0 g/dL    Globulin 3.0 2.0 - 4.0 g/dL    A-G Ratio 0.7 (L) 1.1 - 2.2     LIPASE    Collection Time: 01/27/21  2:26 AM   Result Value Ref Range    Lipase 2,156 (H) 73 - 393 U/L     Recent Labs     01/27/21 0226 01/26/21 0330   WBC 16.2* 14.6*   HGB 9.8* 10.9*   HCT 29.8* 33.3*    306     Recent Labs     01/27/21 0226 01/26/21 0330 01/25/21 0206   * 137 137   K 3.2* 3.3* 3.7    104 104   CO2 27 26 26   BUN 4* 9 9   CREA 0.32* 0.55 0.49*   GLU 52* 67 111*   CA 7.6* 8.0* 8.4*   MG 1.4*  --   --    PHOS 2.7  --   --      Recent Labs     01/27/21 0226 01/26/21 0330 01/25/21 0206 01/24/21  1651   * 524* 629*  --    TP 5.1* 5.8* 6.1*  --    ALB 2.1* 2.5* 2.6*  --    GLOB 3.0 3.3 3.5  --    AML  --   --   --  >1,300*   LPSE 2,156* >3,000* >3,000* >3,000*     No results for input(s): INR, PTP, APTT, INREXT, INREXT in the last 72 hours. No results for input(s): FE, TIBC, PSAT, FERR in the last 72 hours. No results found for: FOL, RBCF   No results for input(s): PH, PCO2, PO2 in the last 72 hours. No results for input(s): CPK, CKNDX, TROIQ in the last 72 hours.     No lab exists for component: CPKMB  Lab Results   Component Value Date/Time    Cholesterol, total 185 10/27/2020 10:20 AM    HDL Cholesterol 44 10/27/2020 10:20 AM    LDL, calculated 112.4 (H) 10/27/2020 10:20 AM    Triglyceride 143 10/27/2020 10:20 AM    CHOL/HDL Ratio 4.2 10/27/2020 10:20 AM     No components found for: [place]  Lab Results   Component Value Date/Time    Color DARK YELLOW 01/22/2021 07:34 PM    Appearance CLOUDY (A) 01/22/2021 07:34 PM    Specific gravity 1.025 01/22/2021 07:34 PM    pH (UA) 6.5 01/22/2021 07:34 PM    Protein 30 (A) 01/22/2021 07:34 PM    Glucose Negative 01/22/2021 07:34 PM    Ketone 40 (A) 01/22/2021 07:34 PM    Urobilinogen 2.0 (H) 01/22/2021 07:34 PM    Nitrites Negative 01/22/2021 07:34 PM    Leukocyte Esterase SMALL (A) 01/22/2021 07:34 PM    Epithelial cells FEW 01/22/2021 07:34 PM    Bacteria Negative 01/22/2021 07:34 PM    WBC 10-20 01/22/2021 07:34 PM    RBC >100 (H) 01/22/2021 07:34 PM        ROS: -CP, SOB, Dysuria, palpitations, cough. Assessment:    Post ERCP pancreatitis  Elevated LFTS  Abnormal MRCP    Active Problems:    Obstructive jaundice (1/22/2021)             Plan/Discussion:     · Continue aggressive IVF resuscitation,  · Keep NPO for now due to worsening leukocytosis. If WBC continues to increase consider more broad spectrum abx.    · Continue pain management   · Daily LFTs and lipase   · Encouraged ambulation as tolerated        HEIKE River  01/27/21  10:32 AM

## 2021-01-27 NOTE — PROGRESS NOTES
End of Shift Note    Bedside shift change report given to [address] (oncoming nurse) by Morteza Feliciano RN (offgoing nurse). Report included the following information SBAR    Shift worked:  7p-7a     Shift summary and any significant changes:     Pt ambulating well with no assistance, urine output greater than 2500 mls this shift. Around 0000, pt suddenly with O2 around 85%, on RA. NP consulted, and patient placed on 2LNC, with improving SpO2 to 95%. Pt tolerating O2 well for remainder of shift, but SOB with minimal exertion and shallow breathing despite pain regimen of Q2-3hr Dilaudid 1mg IV. Concerns for physician to address:  Pt O2 saturation dropped to 85% on RA, placed on 2LNC, chest xray portable completed, and respiratory consulted. Pt tolerating 2LNC, but breathing shallow due to pain, IV 1mg Dilaudid being given regularly, Q2-3hrs. Zone phone for oncoming shift:   [id number]       Activity:  Activity Level: Up ad herb  Number times ambulated in hallways past shift: 0  Number of times OOB to chair past shift: 5    Cardiac:   Cardiac Monitoring: No      Cardiac Rhythm: Normal sinus rhythm    Access:   Current line(s): PIV     Genitourinary:   Urinary status: voiding    Respiratory:   O2 Device: Nasal cannula  Chronic home O2 use?: NO  Incentive spirometer at bedside: NO     GI:  Last Bowel Movement Date: 01/22/21  Current diet:  DIET NPO With Ice Chips  Passing flatus: YES  Tolerating current diet: YES  % Diet Eaten: 0 %    Pain Management:   Patient states pain is manageable on current regimen: YES    Skin:  Daniel Score: 22  Interventions: increase time out of bed    Patient Safety:  Fall Score:  Total Score: 1  Interventions: gripper socks       Length of Stay:  Expected LOS: 4d 9h  Actual LOS: [address] [address] [address], RN

## 2021-01-27 NOTE — PROGRESS NOTES
1700- Perfect serve message to Nimesh Read NP. Made aware that pts last temp was 101.2. I have given her Tylenol. End of Shift Note    Bedside shift change report given to Divine (oncoming nurse) by Gris Graff (offgoing nurse). Report included the following information SBAR, Kardex, Procedure Summary, Intake/Output, MAR and Recent Results    Shift worked:  7a-7p     Shift summary and any significant changes:     Pt medicated for pain throughout shift. Pt ambulating to the bathroom. Educated and encouraged to use incentive spirometer. Pt started on clear liquid diet which she is tolerating. Medicated once with tylenol for temp of 101.2. Concerns for physician to address:       Zone phone for oncoming shift:   2110       Activity:  Activity Level: Up ad herb  Number times ambulated in hallways past shift: 0  Number of times OOB to chair past shift: 3    Cardiac:   Cardiac Monitoring: No      Cardiac Rhythm: Normal sinus rhythm    Access:   Current line(s): PIV     Genitourinary:   Urinary status: voiding    Respiratory:   O2 Device: Nasal cannula  Chronic home O2 use?: NO  Incentive spirometer at bedside: YES     GI:  Last Bowel Movement Date: 01/22/21  Current diet:  DIET CLEAR LIQUID  Passing flatus: YES  Tolerating current diet: YES  % Diet Eaten: 10 %    Pain Management:   Patient states pain is manageable on current regimen: YES    Skin:  Daniel Score: 22  Interventions: increase time out of bed    Patient Safety:  Fall Score:  Total Score: 1  Interventions: gripper socks       Length of Stay:  Expected LOS: 4d 9h  Actual LOS: [address] [address]

## 2021-01-28 ENCOUNTER — APPOINTMENT (OUTPATIENT)
Dept: CT IMAGING | Age: 41
DRG: 444 | End: 2021-01-28
Attending: PHYSICIAN ASSISTANT

## 2021-01-28 LAB
ALBUMIN SERPL-MCNC: 2.1 G/DL (ref 3.5–5)
ALBUMIN/GLOB SERPL: 0.6 {RATIO} (ref 1.1–2.2)
ALP SERPL-CCNC: 358 U/L (ref 45–117)
ALT SERPL-CCNC: 44 U/L (ref 12–78)
ANION GAP SERPL CALC-SCNC: 5 MMOL/L (ref 5–15)
AST SERPL-CCNC: 28 U/L (ref 15–37)
BASOPHILS # BLD: 0 K/UL (ref 0–0.1)
BASOPHILS NFR BLD: 0 % (ref 0–1)
BILIRUB SERPL-MCNC: 1.8 MG/DL (ref 0.2–1)
BUN SERPL-MCNC: 3 MG/DL (ref 6–20)
BUN/CREAT SERPL: 10 (ref 12–20)
CALCIUM SERPL-MCNC: 7.8 MG/DL (ref 8.5–10.1)
CHLORIDE SERPL-SCNC: 102 MMOL/L (ref 97–108)
CO2 SERPL-SCNC: 27 MMOL/L (ref 21–32)
CREAT SERPL-MCNC: 0.29 MG/DL (ref 0.55–1.02)
DIFFERENTIAL METHOD BLD: ABNORMAL
EOSINOPHIL # BLD: 0.1 K/UL (ref 0–0.4)
EOSINOPHIL NFR BLD: 0 % (ref 0–7)
ERYTHROCYTE [DISTWIDTH] IN BLOOD BY AUTOMATED COUNT: 14.6 % (ref 11.5–14.5)
GLOBULIN SER CALC-MCNC: 3.3 G/DL (ref 2–4)
GLUCOSE SERPL-MCNC: 59 MG/DL (ref 65–100)
HCT VFR BLD AUTO: 28 % (ref 35–47)
HEMOCCULT STL QL: NEGATIVE
HGB BLD-MCNC: 9.3 G/DL (ref 11.5–16)
IMM GRANULOCYTES # BLD AUTO: 0.1 K/UL (ref 0–0.04)
IMM GRANULOCYTES NFR BLD AUTO: 1 % (ref 0–0.5)
LIPASE SERPL-CCNC: 369 U/L (ref 73–393)
LYMPHOCYTES # BLD: 0.8 K/UL (ref 0.8–3.5)
LYMPHOCYTES NFR BLD: 4 % (ref 12–49)
MAGNESIUM SERPL-MCNC: 2 MG/DL (ref 1.6–2.4)
MCH RBC QN AUTO: 28.1 PG (ref 26–34)
MCHC RBC AUTO-ENTMCNC: 33.2 G/DL (ref 30–36.5)
MCV RBC AUTO: 84.6 FL (ref 80–99)
MONOCYTES # BLD: 1.4 K/UL (ref 0–1)
MONOCYTES NFR BLD: 7 % (ref 5–13)
NEUTS SEG # BLD: 17 K/UL (ref 1.8–8)
NEUTS SEG NFR BLD: 88 % (ref 32–75)
NRBC # BLD: 0 K/UL (ref 0–0.01)
NRBC BLD-RTO: 0 PER 100 WBC
PLATELET # BLD AUTO: 244 K/UL (ref 150–400)
PMV BLD AUTO: 12.2 FL (ref 8.9–12.9)
POTASSIUM SERPL-SCNC: 3.2 MMOL/L (ref 3.5–5.1)
PROT SERPL-MCNC: 5.4 G/DL (ref 6.4–8.2)
RBC # BLD AUTO: 3.31 M/UL (ref 3.8–5.2)
SODIUM SERPL-SCNC: 134 MMOL/L (ref 136–145)
WBC # BLD AUTO: 19.4 K/UL (ref 3.6–11)

## 2021-01-28 PROCEDURE — 82272 OCCULT BLD FECES 1-3 TESTS: CPT

## 2021-01-28 PROCEDURE — 87040 BLOOD CULTURE FOR BACTERIA: CPT

## 2021-01-28 PROCEDURE — 94760 N-INVAS EAR/PLS OXIMETRY 1: CPT

## 2021-01-28 PROCEDURE — 74011000258 HC RX REV CODE- 258: Performed by: NURSE PRACTITIONER

## 2021-01-28 PROCEDURE — 74011250636 HC RX REV CODE- 250/636: Performed by: INTERNAL MEDICINE

## 2021-01-28 PROCEDURE — 77010033678 HC OXYGEN DAILY

## 2021-01-28 PROCEDURE — 74011250636 HC RX REV CODE- 250/636: Performed by: NURSE PRACTITIONER

## 2021-01-28 PROCEDURE — 83735 ASSAY OF MAGNESIUM: CPT

## 2021-01-28 PROCEDURE — 65270000029 HC RM PRIVATE

## 2021-01-28 PROCEDURE — 74011000636 HC RX REV CODE- 636: Performed by: INTERNAL MEDICINE

## 2021-01-28 PROCEDURE — 74177 CT ABD & PELVIS W/CONTRAST: CPT

## 2021-01-28 PROCEDURE — 36415 COLL VENOUS BLD VENIPUNCTURE: CPT

## 2021-01-28 PROCEDURE — 85025 COMPLETE CBC W/AUTO DIFF WBC: CPT

## 2021-01-28 PROCEDURE — 83690 ASSAY OF LIPASE: CPT

## 2021-01-28 PROCEDURE — 74011000250 HC RX REV CODE- 250: Performed by: INTERNAL MEDICINE

## 2021-01-28 PROCEDURE — 80053 COMPREHEN METABOLIC PANEL: CPT

## 2021-01-28 PROCEDURE — 74011250637 HC RX REV CODE- 250/637: Performed by: STUDENT IN AN ORGANIZED HEALTH CARE EDUCATION/TRAINING PROGRAM

## 2021-01-28 PROCEDURE — 74011250636 HC RX REV CODE- 250/636: Performed by: STUDENT IN AN ORGANIZED HEALTH CARE EDUCATION/TRAINING PROGRAM

## 2021-01-28 RX ORDER — POTASSIUM CHLORIDE 7.45 MG/ML
10 INJECTION INTRAVENOUS
Status: COMPLETED | OUTPATIENT
Start: 2021-01-28 | End: 2021-01-28

## 2021-01-28 RX ORDER — DEXTROSE, SODIUM CHLORIDE, AND POTASSIUM CHLORIDE 5; .9; .15 G/100ML; G/100ML; G/100ML
125 INJECTION INTRAVENOUS CONTINUOUS
Status: DISCONTINUED | OUTPATIENT
Start: 2021-01-28 | End: 2021-01-28

## 2021-01-28 RX ORDER — METRONIDAZOLE 500 MG/100ML
500 INJECTION, SOLUTION INTRAVENOUS EVERY 8 HOURS
Status: DISCONTINUED | OUTPATIENT
Start: 2021-01-28 | End: 2021-01-28 | Stop reason: DRUGHIGH

## 2021-01-28 RX ORDER — DEXTROSE, SODIUM CHLORIDE, AND POTASSIUM CHLORIDE 5; .9; .15 G/100ML; G/100ML; G/100ML
125 INJECTION INTRAVENOUS CONTINUOUS
Status: DISCONTINUED | OUTPATIENT
Start: 2021-01-28 | End: 2021-01-29

## 2021-01-28 RX ORDER — METRONIDAZOLE 500 MG/100ML
500 INJECTION, SOLUTION INTRAVENOUS EVERY 12 HOURS
Status: DISCONTINUED | OUTPATIENT
Start: 2021-01-28 | End: 2021-02-03 | Stop reason: HOSPADM

## 2021-01-28 RX ADMIN — HYDROMORPHONE HYDROCHLORIDE 1 MG: 1 INJECTION, SOLUTION INTRAMUSCULAR; INTRAVENOUS; SUBCUTANEOUS at 09:51

## 2021-01-28 RX ADMIN — LEVOFLOXACIN 500 MG: 5 INJECTION, SOLUTION INTRAVENOUS at 15:00

## 2021-01-28 RX ADMIN — POTASSIUM CHLORIDE 10 MEQ: 10 INJECTION, SOLUTION INTRAVENOUS at 07:34

## 2021-01-28 RX ADMIN — POTASSIUM CHLORIDE, DEXTROSE MONOHYDRATE AND SODIUM CHLORIDE 125 ML/HR: 150; 5; 900 INJECTION, SOLUTION INTRAVENOUS at 18:28

## 2021-01-28 RX ADMIN — CEFEPIME 2 G: 2 INJECTION, POWDER, FOR SOLUTION INTRAVENOUS at 23:52

## 2021-01-28 RX ADMIN — SODIUM CHLORIDE, POTASSIUM CHLORIDE, SODIUM LACTATE AND CALCIUM CHLORIDE 125 ML/HR: 600; 310; 30; 20 INJECTION, SOLUTION INTRAVENOUS at 03:41

## 2021-01-28 RX ADMIN — POTASSIUM CHLORIDE 10 MEQ: 10 INJECTION, SOLUTION INTRAVENOUS at 09:51

## 2021-01-28 RX ADMIN — HYDROMORPHONE HYDROCHLORIDE 1 MG: 1 INJECTION, SOLUTION INTRAMUSCULAR; INTRAVENOUS; SUBCUTANEOUS at 12:55

## 2021-01-28 RX ADMIN — ONDANSETRON 4 MG: 2 INJECTION INTRAMUSCULAR; INTRAVENOUS at 23:33

## 2021-01-28 RX ADMIN — Medication 10 ML: at 06:28

## 2021-01-28 RX ADMIN — POTASSIUM CHLORIDE, DEXTROSE MONOHYDRATE AND SODIUM CHLORIDE 125 ML/HR: 150; 5; 900 INJECTION, SOLUTION INTRAVENOUS at 07:32

## 2021-01-28 RX ADMIN — HYDROMORPHONE HYDROCHLORIDE 1 MG: 1 INJECTION, SOLUTION INTRAMUSCULAR; INTRAVENOUS; SUBCUTANEOUS at 02:10

## 2021-01-28 RX ADMIN — HYDROMORPHONE HYDROCHLORIDE 1 MG: 1 INJECTION, SOLUTION INTRAMUSCULAR; INTRAVENOUS; SUBCUTANEOUS at 06:37

## 2021-01-28 RX ADMIN — HYDROMORPHONE HYDROCHLORIDE 1 MG: 1 INJECTION, SOLUTION INTRAMUSCULAR; INTRAVENOUS; SUBCUTANEOUS at 23:28

## 2021-01-28 RX ADMIN — METRONIDAZOLE 500 MG: 500 INJECTION, SOLUTION INTRAVENOUS at 18:28

## 2021-01-28 RX ADMIN — Medication 10 ML: at 21:37

## 2021-01-28 RX ADMIN — POLYETHYLENE GLYCOL 3350 17 G: 17 POWDER, FOR SOLUTION ORAL at 11:57

## 2021-01-28 RX ADMIN — HYDROMORPHONE HYDROCHLORIDE 1 MG: 1 INJECTION, SOLUTION INTRAMUSCULAR; INTRAVENOUS; SUBCUTANEOUS at 17:20

## 2021-01-28 RX ADMIN — IOPAMIDOL 100 ML: 755 INJECTION, SOLUTION INTRAVENOUS at 09:27

## 2021-01-28 RX ADMIN — CEFEPIME 2 G: 2 INJECTION, POWDER, FOR SOLUTION INTRAVENOUS at 17:20

## 2021-01-28 RX ADMIN — FAMOTIDINE 20 MG: 10 INJECTION INTRAVENOUS at 09:51

## 2021-01-28 RX ADMIN — FAMOTIDINE 20 MG: 10 INJECTION INTRAVENOUS at 21:36

## 2021-01-28 RX ADMIN — Medication 10 ML: at 15:00

## 2021-01-28 NOTE — PROGRESS NOTES
Nakul Chgd to Cefepime and Metronidazole   Post ERCP pancreatitis  Gastritis     Dose of Cefepime adjusted to 2 gm IV q 8h for current Crcl >100  Latrice Rossi, [address]

## 2021-01-28 NOTE — PROGRESS NOTES
Hospitalist Progress Note    NAME: Sharon Hardy   :  1980   MRN:  [id number]     I reviewed pertinent labs/imaging and discussed plan of care with Dr. Paul who is in agreement.    Assessment / Plan:  Abdominal pain  Obstructive jaundice s/p ERCP with biliary sphincterotomy, biliary stone removal and balloon sphincteroplasty on 21  Biliary colic  Post ERCP pancreatitis  Gastritis  CT abdomen/pelvis 21:    Bibasilar subsegmental atelectasis.  Gastritis.  Fluid surrounding the pancreas and duodenum and biliary system.  CT abdomen/pelvis 21:  Imaging findings consistent with worsening changes related to pancreatitis.  Increased ascites.  Increased inflammatory change adjacent to the pancreas.  Persistent gastritis Increased bibasilar atelectasis and pleural effusions.  MRCP 21:  8 mm choledocholithiasis with 8 mm common bile duct dilation.  Gallbladder sludge.  - GI input appreciated.  - Surgery input appreciated.   - Lipase down to 369 today.   - LFTs continue to improve, total bili slightly worse today.  - Continue clear liquids.    - Continue IVF, changed to D5NS with 20 mEq KCl at 125 cc/hr.   - Continue famotidine 20 mg IV q12h.    - Stop levofloxacin.  - Start cefepime/metronidazole (day #1).  - Continue analgesics prn.    - Monitor LFTs and lipase daily.     Leucocytosis   Urine culture 21:  <1,000 CFU/ML.  CXR 21:  Slightly diminished lung volumes and left basilar haziness may represent effusion or atelectasis.  No focal consolidation.  - WBC continues to rise.   - Continues to have low grade fever 24h Tmax 100.1  - Procalcitonin low at 0.13  - Continue IS.  - Encourage ambulation.  - Abx as noted above.       Mild asymptomatic hyponatremia   Hypokalemia   Hypomagnesemia   - Na+ stable.    - Add KCl to IVF.  - Give additional KCl 20 mEq IV.  - Monitor.      Hypoglycemia   - Add dextrose to IVF.    Anemia   - H/H trending down.  - Guaiac stool.    -  Consider transfusion for Hgb <7.0  - monitor. Protein calorie malnutrition  - Advance diet as tolerated. 30.0 - 39.9 Obese / Body mass index is 33.73 kg/m². Code status: Full  Prophylaxis: SCDs, LMWH stopped 2/2 gastritis and anemia   Recommended Disposition: Home w/Family     Subjective:     Chief Complaint / Reason for Physician Visit  Patient had a bad night with abdominal pain. Somewhat better this p.m. Plan of care and pertinent events reviewed with bedside nurse. Review of Systems:  Symptom Y/N Comments  Symptom Y/N Comments   Fever/Chills Y Low grade  Chest Pain N    Poor Appetite Y   Edema N    Cough N   Abdominal Pain Y    Sputum N   Joint Pain N    SOB/COREA N   Pruritis/Rash N    Nausea/vomit N   Tolerating PT/OT     Diarrhea N   Tolerating Diet Y Clears   Constipation N   Other       Could NOT obtain due to:      Objective:     VITALS:   Last 24hrs VS reviewed since prior progress note. Most recent are:  Patient Vitals for the past 24 hrs:   Temp Pulse Resp BP SpO2   01/28/21 1110 99.6 °F (37.6 °C) (!) 103 16 128/83 95 %   01/28/21 0812 98.8 °F (37.1 °C) (!) 103 18 120/83 95 %   01/28/21 0449 99 °F (37.2 °C) (!) 105 18 112/60 95 %   01/27/21 2022 98.1 °F (36.7 °C) 98 16 112/75 96 %   01/27/21 1737 99.8 °F (37.7 °C) -- -- -- --   01/27/21 1611 (!) 101.2 °F (38.4 °C) 93 17 117/73 93 %       Intake/Output Summary (Last 24 hours) at 1/28/2021 1507  Last data filed at 1/28/2021 0732  Gross per 24 hour   Intake 1503.33 ml   Output 3600 ml   Net -2096.67 ml        I had a face to face encounter and independently examined this patient on 1/28/2021, as outlined below:  PHYSICAL EXAM:  General:  A/A/O X 3. NAD. HEENT:  Normocephalic. Sclera anicteric. EOMI. Mucous membranes moist.    Chest:  Resps even/unlabored with symmetrical CWE. Air entry full. Lungs CTA. No use of accessory muscles. CV:  RRR. Normal S1/S2. No M/C/R appreciated. No JVD. No peripheral edema.     GI:  Abdomen soft/distended. Diffusely TTP with increased TTP over epigastric area. No hernia. ABT X 4.    :  Voiding. Neurologic:  Nonfocal.  CN II-XII grossly intact. Face symmetrical.  Speech normal.     Psych:  Cooperative. No anxiety or agitation. No suicidal/homicidal ideation. Skin:  No rashes or jaundice. Reviewed most current lab test results and cultures  YES  Reviewed most current radiology test results   YES  Review and summation of old records today    NO  Reviewed patient's current orders and MAR    YES  PMH/SH reviewed - no change compared to H&P  ________________________________________________________________________  Care Plan discussed with:    Comments   Patient [address]     Consultant                        Multidiciplinary team rounds were held today with , nursing, pharmacist and clinical coordinator. Patient's plan of care was discussed; medications were reviewed and discharge planning was addressed. ________________________________________________________________________  Serafin Persaud NP     Procedures: see electronic medical records for all procedures/Xrays and details which were not copied into this note but were reviewed prior to creation of Plan. LABS:  I reviewed today's most current labs and imaging studies.   Pertinent labs include:  Recent Labs     01/28/21 0332 01/27/21 0226 01/26/21 0330   WBC 19.4* 16.2* 14.6*   HGB 9.3* 9.8* 10.9*   HCT 28.0* 29.8* 33.3*    248 306     Recent Labs     01/28/21 0332 01/27/21 0226 01/26/21 0330   * 134* 137   K 3.2* 3.2* 3.3*    100 104   CO2 27 27 26   GLU 59* 52* 67   BUN 3* 4* 9   CREA 0.29* 0.32* 0.55   CA 7.8* 7.6* 8.0*   MG 2.0 1.4*  --    PHOS  --  2.7  --    ALB 2.1* 2.1* 2.5*   TBILI 1.8* 1.7* 1.8*   ALT 44 56 90*       Signed: Serafin Persaud, NP

## 2021-01-28 NOTE — PROGRESS NOTES
End of Shift Note    Bedside shift change report given to [address] (oncoming nurse) by Justine Salomon RN (offgoing nurse). Report included the following information SBAR    Shift worked:  7p-7a     Shift summary and any significant changes:     Pt had no significant changes this shift, up OOB to bathroom multiple times, voiding well, thor urine remains, drinking fair amount, encouraged to increase. Pain manageable with PRN Dilaudid 1mg. Managing IS well independently. Concerns for physician to address:  none     Zone phone for oncoming shift:   [phone]       Activity:  Activity Level: Up ad herb  Number times ambulated in hallways past shift: 0  Number of times OOB to chair past shift: 4    Cardiac:   Cardiac Monitoring: No      Cardiac Rhythm: Normal sinus rhythm    Access:   Current line(s): PIV     Genitourinary:   Urinary status: voiding    Respiratory:   O2 Device: Nasal cannula  Chronic home O2 use?: NO  Incentive spirometer at bedside: YES     GI:  Last Bowel Movement Date: 01/22/21  Current diet:  DIET CLEAR LIQUID  Passing flatus: YES  Tolerating current diet: YES  % Diet Eaten: 10 %    Pain Management:   Patient states pain is manageable on current regimen: YES    Skin:  Daniel Score: 22  Interventions: increase time out of bed    Patient Safety:  Fall Score:  Total Score: 1  Interventions: gripper socks       Length of Stay:  Expected LOS: 4d 9h  Actual LOS: Jose Luis Patel RN

## 2021-01-28 NOTE — PROGRESS NOTES
Spiritual Care Assessment/Progress Note  [facility]      NAME: Elmer Adan      MRN: [id number]  AGE: 36 y.o. SEX: female  Yazidism Affiliation: Reena   Language: Nigerien     1/28/2021     Total Time (in minutes): 20     Spiritual Assessment begun in [facility] through conversation with:         [x]Patient        [x] Family    [] Friend(s)        Reason for Consult: Initial/Spiritual assessment, patient floor     Spiritual beliefs: (Please include comment if needed)     [x] Identifies with a sharon tradition: [employer]        [x] Supported by a sharon community: [address] [place] [address]           [] Claims no spiritual orientation:           [] Seeking spiritual identity:                [] Adheres to an individual form of spirituality:           [] Not able to assess:                           Identified resources for coping:      [x] Prayer                               [] Music                  [] Guided Imagery     [x] Family/friends                 [] Pet visits     [x] Devotional reading                         [] Unknown     [] Other:                                               Interventions offered during this visit: (See comments for more details)    Patient Interventions: Initial/Spiritual assessment, patient floor, Initial visit, Affirmation of emotions/emotional suffering, Affirmation of sharon, Iconic (affirming the presence of God/Higher Power), Life review/legacy, Prayer (assurance of), Prayer (actual), Normalization of emotional/spiritual concerns     Family/Friend(s):  Affirmation of emotions/emotional suffering, Affirmation of sharon, Coping skills reviewed/reinforced, Catharsis/review of pertinent events in supportive environment, Prayer (actual), Prayer (assurance of), Life review/legacy, Normalization of emotional/spiritual concerns, Iconic (affirming the presence of God/Higher Power)     Plan of Care:     [] Support spiritual and/or cultural needs    [] Support AMD and/or advance care planning process      [] Support grieving process   [] Coordinate Rites and/or Rituals    [] Coordination with community clergy   [] No spiritual needs identified at this time   [] Detailed Plan of Care below (See Comments)  [] Make referral to Music Therapy  [] Make referral to Pet Therapy     [] Make referral to Addiction services  [] Make referral to [facility]  [] Make referral to Spiritual Care Partner  [] No future visits requested        [x] Follow up upon further referrals     Comments:     Initial Spiritual Care Assessment visit in 2135. Patient and her  Caitlin Fisher were present during the visit. Patient shared about her sharon Roman Catholic as her coping skill during her difficult time with her  Caitlin Fisher. They said they are from [facility]. Per pt, she has gall bladder issue and pancreas issue and it feels it is a complicated   medical situation.  empathized with her and explored their support, they are closely connected their Denominational members.  prayed for their healing and recovery by their request. They were thankful for 's ministry. Advised of  availability.       [address] [facility], MStarla., M.S., Th.M.  Spiritual Care Provider   Paging Service [phone])

## 2021-01-28 NOTE — PROGRESS NOTES
Gastroenterology Progress Note  [place]   for Dr. Gregorio Benton    1/28/2021    Admit Date: 1/22/2021    Subjective: Follow up for: Obstructive jaundice, RUQ pain, post ERCP pancreatitis      Patient was seen in rounds by me today. Had a rough night again last night with upper abd pain radiating to flanks and back  Mild nausea and bloating.  +flatus  Tried small sips of liquids    Bili stable at 1.8, other LFT's improved  Lipase now normal at 369  WBC up to  19.4 and Tm 101.2      Current Facility-Administered Medications   Medication Dose Route Frequency    potassium chloride 10 mEq in 100 ml IVPB  10 mEq IntraVENous Q1H    dextrose 5% - 0.9% NaCl with KCl 20 mEq/L infusion  125 mL/hr IntraVENous CONTINUOUS    HYDROmorphone (PF) (DILAUDID) injection 1 mg  1 mg IntraVENous Q2H PRN    sodium chloride (NS) flush 5-40 mL  5-40 mL IntraVENous Q8H    fentaNYL citrate (PF) injection 12.5 mcg  12.5 mcg IntraVENous Q6H PRN    famotidine (PF) (PEPCID) 20 mg in 0.9% sodium chloride 10 mL injection  20 mg IntraVENous Q12H    hydrOXYzine HCL (ATARAX) tablet 25 mg  25 mg Oral TID PRN    levoFLOXacin (LEVAQUIN) 500 mg in D5W IVPB  500 mg IntraVENous Q24H    sodium chloride (NS) flush 5-40 mL  5-40 mL IntraVENous PRN    acetaminophen (TYLENOL) tablet 650 mg  650 mg Oral Q6H PRN    Or    acetaminophen (TYLENOL) suppository 650 mg  650 mg Rectal Q6H PRN    polyethylene glycol (MIRALAX) packet 17 g  17 g Oral DAILY PRN    promethazine (PHENERGAN) tablet 12.5 mg  12.5 mg Oral Q6H PRN    Or    ondansetron (ZOFRAN) injection 4 mg  4 mg IntraVENous Q6H PRN        Objective:     Blood pressure 120/83, pulse (!) 103, temperature 98.8 °F (37.1 °C), resp. rate 18, height 5' 6\" (1.676 m), weight 94.8 kg (209 lb), last menstrual period 01/21/2021, SpO2 95 %. 01/28 0701 - 01/28 1900  In: 195.8 [I.V.:195.8]  Out: -     01/26 1901 - 01/28 0700  In: 4407.5 [P.O.:120;  I.V.:4287.5]  Out: 7025 [YBZY]        Physical Examination:       General: Pleasent female in NAD,  HEENT:  NCAT, no scleral icterus  Chest:  CTA,   Heart: S1, S2, RRR  GI: Soft, upper abd tenderness most notable in RUQ without guarding or rebound, normal BS  Extremities: No edema   CNS: CNs grossly normal.    Data Review    Recent Results (from the past 24 hour(s))   METABOLIC PANEL, COMPREHENSIVE    Collection Time: 21  3:32 AM   Result Value Ref Range    Sodium 134 (L) 136 - 145 mmol/L    Potassium 3.2 (L) 3.5 - 5.1 mmol/L    Chloride 102 97 - 108 mmol/L    CO2 27 21 - 32 mmol/L    Anion gap 5 5 - 15 mmol/L    Glucose 59 (L) 65 - 100 mg/dL    BUN 3 (L) 6 - 20 MG/DL    Creatinine 0.29 (L) 0.55 - 1.02 MG/DL    BUN/Creatinine ratio 10 (L) 12 - 20      GFR est AA >60 >60 ml/min/1.73m2    GFR est non-AA >60 >60 ml/min/1.73m2    Calcium 7.8 (L) 8.5 - 10.1 MG/DL    Bilirubin, total 1.8 (H) 0.2 - 1.0 MG/DL    ALT (SGPT) 44 12 - 78 U/L    AST (SGOT) 28 15 - 37 U/L    Alk.  phosphatase 358 (H) 45 - 117 U/L    Protein, total 5.4 (L) 6.4 - 8.2 g/dL    Albumin 2.1 (L) 3.5 - 5.0 g/dL    Globulin 3.3 2.0 - 4.0 g/dL    A-G Ratio 0.6 (L) 1.1 - 2.2     LIPASE    Collection Time: 21  3:32 AM   Result Value Ref Range    Lipase 369 73 - 393 U/L   MAGNESIUM    Collection Time: 21  3:32 AM   Result Value Ref Range    Magnesium 2.0 1.6 - 2.4 mg/dL   CBC WITH AUTOMATED DIFF    Collection Time: 21  3:32 AM   Result Value Ref Range    WBC 19.4 (H) 3.6 - 11.0 K/uL    RBC 3.31 (L) 3.80 - 5.20 M/uL    HGB 9.3 (L) 11.5 - 16.0 g/dL    HCT 28.0 (L) 35.0 - 47.0 %    MCV 84.6 80.0 - 99.0 FL    MCH 28.1 26.0 - 34.0 PG    MCHC 33.2 30.0 - 36.5 g/dL    RDW 14.6 (H) 11.5 - 14.5 %    PLATELET 002 766 - 863 K/uL    MPV 12.2 8.9 - 12.9 FL    NRBC 0.0 0  WBC    ABSOLUTE NRBC 0.00 0.00 - 0.01 K/uL    NEUTROPHILS 88 (H) 32 - 75 %    LYMPHOCYTES 4 (L) 12 - 49 %    MONOCYTES 7 5 - 13 %    EOSINOPHILS 0 0 - 7 %    BASOPHILS 0 0 - 1 %    IMMATURE GRANULOCYTES 1 (H) 0.0 - 0.5 %    ABS. NEUTROPHILS 17.0 (H) 1.8 - 8.0 K/UL    ABS. LYMPHOCYTES 0.8 0.8 - 3.5 K/UL    ABS. MONOCYTES 1.4 (H) 0.0 - 1.0 K/UL    ABS. EOSINOPHILS 0.1 0.0 - 0.4 K/UL    ABS. BASOPHILS 0.0 0.0 - 0.1 K/UL    ABS. IMM. GRANS. 0.1 (H) 0.00 - 0.04 K/UL    DF AUTOMATED       Recent Labs     01/28/21 0332 01/27/21 0226   WBC 19.4* 16.2*   HGB 9.3* 9.8*   HCT 28.0* 29.8*    248     Recent Labs     01/28/21 0332 01/27/21 0226 01/26/21 0330   * 134* 137   K 3.2* 3.2* 3.3*    100 104   CO2 27 27 26   BUN 3* 4* 9   CREA 0.29* 0.32* 0.55   GLU 59* 52* 67   CA 7.8* 7.6* 8.0*   MG 2.0 1.4*  --    PHOS  --  2.7  --      Recent Labs     01/28/21 0332 01/27/21 0226 01/26/21 0330   * 386* 524*   TP 5.4* 5.1* 5.8*   ALB 2.1* 2.1* 2.5*   GLOB 3.3 3.0 3.3   LPSE 369 2,156* >3,000*     No results for input(s): INR, PTP, APTT, INREXT, INREXT in the last 72 hours. No results for input(s): FE, TIBC, PSAT, FERR in the last 72 hours. No results found for: FOL, RBCF   No results for input(s): PH, PCO2, PO2 in the last 72 hours. No results for input(s): CPK, CKNDX, TROIQ in the last 72 hours.     No lab exists for component: CPKMB  Lab Results   Component Value Date/Time    Cholesterol, total 185 10/27/2020 10:20 AM    HDL Cholesterol 44 10/27/2020 10:20 AM    LDL, calculated 112.4 (H) 10/27/2020 10:20 AM    Triglyceride 143 10/27/2020 10:20 AM    CHOL/HDL Ratio 4.2 10/27/2020 10:20 AM     No components found for: [place]  Lab Results   Component Value Date/Time    Color DARK YELLOW 01/22/2021 07:34 PM    Appearance CLOUDY (A) 01/22/2021 07:34 PM    Specific gravity 1.025 01/22/2021 07:34 PM    pH (UA) 6.5 01/22/2021 07:34 PM    Protein 30 (A) 01/22/2021 07:34 PM    Glucose Negative 01/22/2021 07:34 PM    Ketone 40 (A) 01/22/2021 07:34 PM    Urobilinogen 2.0 (H) 01/22/2021 07:34 PM    Nitrites Negative 01/22/2021 07:34 PM    Leukocyte Esterase SMALL (A) 01/22/2021 07:34 PM Epithelial cells FEW 01/22/2021 07:34 PM    Bacteria Negative 01/22/2021 07:34 PM    WBC 10-20 01/22/2021 07:34 PM    RBC >100 (H) 01/22/2021 07:34 PM        ROS: -CP, SOB, Dysuria, palpitations, cough. Assessment:    Post ERCP pancreatitis  Elevated LFTS  Abnormal MRCP    Active Problems:    Obstructive jaundice (1/22/2021)             Plan/Discussion:     · Due to worsening WBC, I ordered repeat CT with contrast.  Consider more broad spectrum abx. · Continue pain management and IVF.     · Daily LFTs, lipase and CBC  · Encouraged ambulation as tolerated        Bebeto Myers, [address]  01/28/21  8:54 AM

## 2021-01-29 LAB
ALBUMIN SERPL-MCNC: 2 G/DL (ref 3.5–5)
ALBUMIN/GLOB SERPL: 0.6 {RATIO} (ref 1.1–2.2)
ALP SERPL-CCNC: 313 U/L (ref 45–117)
ALT SERPL-CCNC: 34 U/L (ref 12–78)
ANION GAP SERPL CALC-SCNC: 5 MMOL/L (ref 5–15)
AST SERPL-CCNC: 24 U/L (ref 15–37)
BASOPHILS # BLD: 0 K/UL (ref 0–0.1)
BASOPHILS NFR BLD: 0 % (ref 0–1)
BILIRUB SERPL-MCNC: 1 MG/DL (ref 0.2–1)
BUN SERPL-MCNC: 3 MG/DL (ref 6–20)
BUN/CREAT SERPL: 8 (ref 12–20)
CALCIUM SERPL-MCNC: 7.6 MG/DL (ref 8.5–10.1)
CHLORIDE SERPL-SCNC: 107 MMOL/L (ref 97–108)
CO2 SERPL-SCNC: 27 MMOL/L (ref 21–32)
CREAT SERPL-MCNC: 0.39 MG/DL (ref 0.55–1.02)
DIFFERENTIAL METHOD BLD: ABNORMAL
EOSINOPHIL # BLD: 0.1 K/UL (ref 0–0.4)
EOSINOPHIL NFR BLD: 1 % (ref 0–7)
ERYTHROCYTE [DISTWIDTH] IN BLOOD BY AUTOMATED COUNT: 14.6 % (ref 11.5–14.5)
GLOBULIN SER CALC-MCNC: 3.4 G/DL (ref 2–4)
GLUCOSE SERPL-MCNC: 135 MG/DL (ref 65–100)
HCT VFR BLD AUTO: 26.4 % (ref 35–47)
HGB BLD-MCNC: 8.7 G/DL (ref 11.5–16)
IMM GRANULOCYTES # BLD AUTO: 0.1 K/UL (ref 0–0.04)
IMM GRANULOCYTES NFR BLD AUTO: 1 % (ref 0–0.5)
LIPASE SERPL-CCNC: 169 U/L (ref 73–393)
LYMPHOCYTES # BLD: 1 K/UL (ref 0.8–3.5)
LYMPHOCYTES NFR BLD: 6 % (ref 12–49)
MAGNESIUM SERPL-MCNC: 2 MG/DL (ref 1.6–2.4)
MCH RBC QN AUTO: 27.8 PG (ref 26–34)
MCHC RBC AUTO-ENTMCNC: 33 G/DL (ref 30–36.5)
MCV RBC AUTO: 84.3 FL (ref 80–99)
MONOCYTES # BLD: 1.5 K/UL (ref 0–1)
MONOCYTES NFR BLD: 9 % (ref 5–13)
NEUTS SEG # BLD: 13.4 K/UL (ref 1.8–8)
NEUTS SEG NFR BLD: 83 % (ref 32–75)
NRBC # BLD: 0 K/UL (ref 0–0.01)
NRBC BLD-RTO: 0 PER 100 WBC
PLATELET # BLD AUTO: 293 K/UL (ref 150–400)
PMV BLD AUTO: 11.6 FL (ref 8.9–12.9)
POTASSIUM SERPL-SCNC: 3.3 MMOL/L (ref 3.5–5.1)
PROT SERPL-MCNC: 5.4 G/DL (ref 6.4–8.2)
RBC # BLD AUTO: 3.13 M/UL (ref 3.8–5.2)
SODIUM SERPL-SCNC: 139 MMOL/L (ref 136–145)
WBC # BLD AUTO: 16.1 K/UL (ref 3.6–11)

## 2021-01-29 PROCEDURE — 36415 COLL VENOUS BLD VENIPUNCTURE: CPT

## 2021-01-29 PROCEDURE — 85025 COMPLETE CBC W/AUTO DIFF WBC: CPT

## 2021-01-29 PROCEDURE — 74011000258 HC RX REV CODE- 258: Performed by: NURSE PRACTITIONER

## 2021-01-29 PROCEDURE — 74011250636 HC RX REV CODE- 250/636: Performed by: INTERNAL MEDICINE

## 2021-01-29 PROCEDURE — 83735 ASSAY OF MAGNESIUM: CPT

## 2021-01-29 PROCEDURE — 65270000029 HC RM PRIVATE

## 2021-01-29 PROCEDURE — 74011250637 HC RX REV CODE- 250/637: Performed by: STUDENT IN AN ORGANIZED HEALTH CARE EDUCATION/TRAINING PROGRAM

## 2021-01-29 PROCEDURE — 80053 COMPREHEN METABOLIC PANEL: CPT

## 2021-01-29 PROCEDURE — 74011000250 HC RX REV CODE- 250: Performed by: INTERNAL MEDICINE

## 2021-01-29 PROCEDURE — 74011250636 HC RX REV CODE- 250/636: Performed by: NURSE PRACTITIONER

## 2021-01-29 PROCEDURE — 83690 ASSAY OF LIPASE: CPT

## 2021-01-29 RX ORDER — POTASSIUM CHLORIDE 7.45 MG/ML
10 INJECTION INTRAVENOUS
Status: DISPENSED | OUTPATIENT
Start: 2021-01-29 | End: 2021-01-29

## 2021-01-29 RX ORDER — POTASSIUM CHLORIDE 7.45 MG/ML
10 INJECTION INTRAVENOUS
Status: COMPLETED | OUTPATIENT
Start: 2021-01-29 | End: 2021-01-29

## 2021-01-29 RX ORDER — DEXTROSE, SODIUM CHLORIDE, AND POTASSIUM CHLORIDE 5; .45; .3 G/100ML; G/100ML; G/100ML
INJECTION INTRAVENOUS CONTINUOUS
Status: DISCONTINUED | OUTPATIENT
Start: 2021-01-29 | End: 2021-01-31

## 2021-01-29 RX ADMIN — Medication 10 ML: at 05:30

## 2021-01-29 RX ADMIN — POTASSIUM CHLORIDE 10 MEQ: 10 INJECTION, SOLUTION INTRAVENOUS at 09:16

## 2021-01-29 RX ADMIN — HYDROMORPHONE HYDROCHLORIDE 1 MG: 1 INJECTION, SOLUTION INTRAMUSCULAR; INTRAVENOUS; SUBCUTANEOUS at 17:28

## 2021-01-29 RX ADMIN — FAMOTIDINE 20 MG: 10 INJECTION INTRAVENOUS at 20:35

## 2021-01-29 RX ADMIN — ACETAMINOPHEN 650 MG: 325 TABLET ORAL at 00:08

## 2021-01-29 RX ADMIN — POTASSIUM CHLORIDE 10 MEQ: 10 INJECTION, SOLUTION INTRAVENOUS at 12:05

## 2021-01-29 RX ADMIN — Medication 10 ML: at 14:00

## 2021-01-29 RX ADMIN — FAMOTIDINE 20 MG: 10 INJECTION INTRAVENOUS at 09:16

## 2021-01-29 RX ADMIN — HYDROMORPHONE HYDROCHLORIDE 1 MG: 1 INJECTION, SOLUTION INTRAMUSCULAR; INTRAVENOUS; SUBCUTANEOUS at 13:22

## 2021-01-29 RX ADMIN — METRONIDAZOLE 500 MG: 500 INJECTION, SOLUTION INTRAVENOUS at 06:29

## 2021-01-29 RX ADMIN — POTASSIUM CHLORIDE, DEXTROSE MONOHYDRATE AND SODIUM CHLORIDE 125 ML/HR: 150; 5; 900 INJECTION, SOLUTION INTRAVENOUS at 17:22

## 2021-01-29 RX ADMIN — Medication 10 ML: at 21:00

## 2021-01-29 RX ADMIN — POTASSIUM CHLORIDE, DEXTROSE MONOHYDRATE AND SODIUM CHLORIDE 125 ML/HR: 150; 5; 900 INJECTION, SOLUTION INTRAVENOUS at 06:29

## 2021-01-29 RX ADMIN — HYDROMORPHONE HYDROCHLORIDE 1 MG: 1 INJECTION, SOLUTION INTRAMUSCULAR; INTRAVENOUS; SUBCUTANEOUS at 09:16

## 2021-01-29 RX ADMIN — POTASSIUM CHLORIDE, DEXTROSE MONOHYDRATE AND SODIUM CHLORIDE: 300; 5; 450 INJECTION, SOLUTION INTRAVENOUS at 20:34

## 2021-01-29 RX ADMIN — CEFEPIME 2 G: 2 INJECTION, POWDER, FOR SOLUTION INTRAVENOUS at 09:16

## 2021-01-29 RX ADMIN — CEFEPIME 2 G: 2 INJECTION, POWDER, FOR SOLUTION INTRAVENOUS at 15:38

## 2021-01-29 RX ADMIN — METRONIDAZOLE 500 MG: 500 INJECTION, SOLUTION INTRAVENOUS at 17:21

## 2021-01-29 RX ADMIN — HYDROMORPHONE HYDROCHLORIDE 1 MG: 1 INJECTION, SOLUTION INTRAMUSCULAR; INTRAVENOUS; SUBCUTANEOUS at 21:00

## 2021-01-29 NOTE — PROGRESS NOTES
Gastroenterology Daily Progress Note (Dr. Henna Nichole)   [facility]    Admit Date: 1/22/2021       Subjective:       Pain is slightly better. Last dilaudid injection was 1 am.  Slight fever overnight. Had some loose BMs with miralax  Current Facility-Administered Medications   Medication Dose Route Frequency    potassium chloride 10 mEq in 100 ml IVPB  10 mEq IntraVENous Q1H    dextrose 5% - 0.9% NaCl with KCl 20 mEq/L infusion  125 mL/hr IntraVENous CONTINUOUS    metroNIDAZOLE (FLAGYL) IVPB premix 500 mg  500 mg IntraVENous Q12H    cefepime (MAXIPIME) 2 g in 0.9% sodium chloride (MBP/ADV) 100 mL MBP  2 g IntraVENous Q8H    HYDROmorphone (PF) (DILAUDID) injection 1 mg  1 mg IntraVENous Q2H PRN    sodium chloride (NS) flush 5-40 mL  5-40 mL IntraVENous Q8H    fentaNYL citrate (PF) injection 12.5 mcg  12.5 mcg IntraVENous Q6H PRN    famotidine (PF) (PEPCID) 20 mg in 0.9% sodium chloride 10 mL injection  20 mg IntraVENous Q12H    hydrOXYzine HCL (ATARAX) tablet 25 mg  25 mg Oral TID PRN    sodium chloride (NS) flush 5-40 mL  5-40 mL IntraVENous PRN    acetaminophen (TYLENOL) tablet 650 mg  650 mg Oral Q6H PRN    Or    acetaminophen (TYLENOL) suppository 650 mg  650 mg Rectal Q6H PRN    polyethylene glycol (MIRALAX) packet 17 g  17 g Oral DAILY PRN    promethazine (PHENERGAN) tablet 12.5 mg  12.5 mg Oral Q6H PRN    Or    ondansetron (ZOFRAN) injection 4 mg  4 mg IntraVENous Q6H PRN        Objective:     Visit Vitals  /67   Pulse 77   Temp 97.8 °F (36.6 °C)   Resp 17   Ht 5' 6\" (1.676 m)   Wt 94.8 kg (209 lb)   SpO2 97%   BMI 33.73 kg/m²   Blood pressure 100/67, pulse 77, temperature 97.8 °F (36.6 °C), resp. rate 17, height 5' 6\" (1.676 m), weight 94.8 kg (209 lb), last menstrual period 01/21/2021, SpO2 97 %. No intake/output data recorded. 01/27 1901 - 01/29 0700  In: 4303.3 [P.O.:720;  I.V.:3583.3]  Out: 6500 [Urine:5900]      Intake/Output Summary (Last 24 hours) at 1/29/2021 0724  Last data filed at 1/29/2021 0330  Gross per 24 hour   Intake 3115.83 ml   Output 3900 ml   Net -784.17 ml       Physical Exam:     General: comfortable  F in NAD  Chest:  CTA, No rhonchi, rales or rubs. Heart: S1, S2, RRR  GI: Soft, tender in lower abdomen and epigastrim, no guarding or rebound. Labs:       Recent Results (from the past 24 hour(s))   OCCULT BLOOD, STOOL    Collection Time: 01/28/21  9:49 PM   Result Value Ref Range    Occult blood, stool Negative NEG     METABOLIC PANEL, COMPREHENSIVE    Collection Time: 01/29/21  2:40 AM   Result Value Ref Range    Sodium 139 136 - 145 mmol/L    Potassium 3.3 (L) 3.5 - 5.1 mmol/L    Chloride 107 97 - 108 mmol/L    CO2 27 21 - 32 mmol/L    Anion gap 5 5 - 15 mmol/L    Glucose 135 (H) 65 - 100 mg/dL    BUN 3 (L) 6 - 20 MG/DL    Creatinine 0.39 (L) 0.55 - 1.02 MG/DL    BUN/Creatinine ratio 8 (L) 12 - 20      GFR est AA >60 >60 ml/min/1.73m2    GFR est non-AA >60 >60 ml/min/1.73m2    Calcium 7.6 (L) 8.5 - 10.1 MG/DL    Bilirubin, total 1.0 0.2 - 1.0 MG/DL    ALT (SGPT) 34 12 - 78 U/L    AST (SGOT) 24 15 - 37 U/L    Alk.  phosphatase 313 (H) 45 - 117 U/L    Protein, total 5.4 (L) 6.4 - 8.2 g/dL    Albumin 2.0 (L) 3.5 - 5.0 g/dL    Globulin 3.4 2.0 - 4.0 g/dL    A-G Ratio 0.6 (L) 1.1 - 2.2     LIPASE    Collection Time: 01/29/21  2:40 AM   Result Value Ref Range    Lipase 169 73 - 393 U/L   CBC WITH AUTOMATED DIFF    Collection Time: 01/29/21  2:40 AM   Result Value Ref Range    WBC 16.1 (H) 3.6 - 11.0 K/uL    RBC 3.13 (L) 3.80 - 5.20 M/uL    HGB 8.7 (L) 11.5 - 16.0 g/dL    HCT 26.4 (L) 35.0 - 47.0 %    MCV 84.3 80.0 - 99.0 FL    MCH 27.8 26.0 - 34.0 PG    MCHC 33.0 30.0 - 36.5 g/dL    RDW 14.6 (H) 11.5 - 14.5 %    PLATELET 828 356 - 023 K/uL    MPV 11.6 8.9 - 12.9 FL    NRBC 0.0 0  WBC    ABSOLUTE NRBC 0.00 0.00 - 0.01 K/uL    NEUTROPHILS 83 (H) 32 - 75 %    LYMPHOCYTES 6 (L) 12 - 49 %    MONOCYTES 9 5 - 13 %    EOSINOPHILS 1 0 - 7 % BASOPHILS 0 0 - 1 %    IMMATURE GRANULOCYTES 1 (H) 0.0 - 0.5 %    ABS. NEUTROPHILS 13.4 (H) 1.8 - 8.0 K/UL    ABS. LYMPHOCYTES 1.0 0.8 - 3.5 K/UL    ABS. MONOCYTES 1.5 (H) 0.0 - 1.0 K/UL    ABS. EOSINOPHILS 0.1 0.0 - 0.4 K/UL    ABS. BASOPHILS 0.0 0.0 - 0.1 K/UL    ABS. IMM. GRANS. 0.1 (H) 0.00 - 0.04 K/UL    DF AUTOMATED     MAGNESIUM    Collection Time: 01/29/21  2:40 AM   Result Value Ref Range    Magnesium 2.0 1.6 - 2.4 mg/dL   LABRCNT(wbc:2,hgb:2,hct:2,plt:2,)  Recent Labs     01/29/21 0240 01/28/21 0332 01/27/21 0226    134* 134*   K 3.3* 3.2* 3.2*    102 100   CO2 27 27 27   BUN 3* 3* 4*   CREA 0.39* 0.29* 0.32*   * 59* 52*   CA 7.6* 7.8* 7.6*   MG 2.0 2.0 1.4*   PHOS  --   --  2.7     Recent Labs     01/29/21 0240 01/28/21 0332 01/27/21 0226   * 358* 386*   TP 5.4* 5.4* 5.1*   ALB 2.0* 2.1* 2.1*   GLOB 3.4 3.3 3.0   LPSE 169 369 2,156*       Impression/Plan:    Post ERCP Pancreatitis: moderately severe    Leukocytosis     --> CT repeated with ongoing smoldering acute pancreatitis. No necrosis seen. Pt's lipase has normalized and this may be an encouraging sign. She had a decrease in her WBC count to 16 today from 19 so will keep on clears for today and consider advancing tomorrow as tolerated.   On call GI to see tomorrow         Felipe Kirkpatrick MD    1/29/2021  3500  [address] [place] [address], [address] [place] [address]  [phone] [address] [address]: [phone]

## 2021-01-29 NOTE — PROGRESS NOTES
End of Shift Note    Bedside shift change report given to Dav Olivarez (oncoming nurse) by Sj Carvalho RN (offgoing nurse). Report included the following information SBAR    Shift worked:  7p-7a     Shift summary and any significant changes:     Pt had BM on 1/28, mixed loose and formed stool, occult negative. Morning labs reflect decrease in WBC. Tolerating clear diet. Nausea meds x 1 last night, no vomiting. Voiding well. Pain managed well. Tylenol given x 1 for low grade fever that was resolved. Concerns for physician to address:  na   Zone phone for oncoming shift:  [phone]       Activity:  Activity Level: Chair  Number times ambulated in hallways past shift: 0  Number of times OOB to chair past shift: 1    Cardiac:   Cardiac Monitoring: No      Cardiac Rhythm: Normal sinus rhythm    Access:   Current line(s): PIV     Genitourinary:   Urinary status: voiding    Respiratory:   O2 Device: Room air  Chronic home O2 use?: NO  Incentive spirometer at bedside: YES     GI:  Last Bowel Movement Date: (P) 01/28/21  Current diet:  DIET CLEAR LIQUID  Passing flatus: YES  Tolerating current diet: YES  % Diet Eaten: 20 %    Pain Management:   Patient states pain is manageable on current regimen: YES    Skin:  Daniel Score: 21  Interventions: increase time out of bed    Patient Safety:  Fall Score:  Total Score: 1  Interventions: gripper socks       Length of Stay:  Expected LOS: 4d 9h  Actual LOS: Ned Bravo RN

## 2021-01-29 NOTE — PROGRESS NOTES
End of Shift Note    Bedside shift change report given to Divine (oncoming nurse) by Emilie Andrade (offgoing nurse). Report included the following information SBAR, Kardex, Procedure Summary, Intake/Output, MAR and Recent Results    Shift worked:  7a-7p     Shift summary and any significant changes:    Pt medicated for pain throughout shift. Pt had CT scan. Ambulating to the bathroom. Tolerating clear liquid diet. Pt given prn miralax for constipation. No bowel movement this shift. Was unable to obtain occult stool but pt aware sample is needed. Paired blood cultures collected. Concerns for physician to address:  Constipation      Zone phone for oncoming shift:   [phone]       Activity:  Activity Level: Up ad herb  Number times ambulated in hallways past shift: 0  Number of times OOB to chair past shift: 5    Cardiac:   Cardiac Monitoring: No      Cardiac Rhythm: Normal sinus rhythm    Access:   Current line(s): PIV     Genitourinary:   Urinary status: voiding    Respiratory:   O2 Device: Nasal cannula  Chronic home O2 use?: NO  Incentive spirometer at bedside: YES  GI:  Last Bowel Movement Date: 01/22/21  Current diet:  DIET CLEAR LIQUID  Passing flatus: YES  Tolerating current diet: YES  % Diet Eaten: 20 %    Pain Management:   Patient states pain is manageable on current regimen: YES    Skin:  Daniel Score: 21  Interventions: increase time out of bed    Patient Safety:  Fall Score:  Total Score: 1  Interventions: gripper socks       Length of Stay:  Expected LOS: 4d 9h  Actual LOS: [facility]

## 2021-01-29 NOTE — PROGRESS NOTES
Hospitalist Progress Note    NAME: Gypsy Osorio   :  1980   MRN:  [id number]     I reviewed pertinent labs/imaging and discussed plan of care with Dr. Megan Elam who is in agreement. Assessment / Plan:  Abdominal pain  Obstructive jaundice s/p ERCP with biliary sphincterotomy, biliary stone removal and balloon sphincteroplasty on   Biliary colic  Post ERCP pancreatitis  Gastritis  CT abdomen/pelvis 21:    Bibasilar subsegmental atelectasis. Gastritis. Fluid surrounding the pancreas and duodenum and biliary system. CT abdomen/pelvis 21:  Imaging findings consistent with worsening changes related to pancreatitis. Increased ascites. Increased inflammatory change adjacent to the pancreas. Persistent gastritis Increased bibasilar atelectasis and pleural effusions. MRCP 21:  8 mm choledocholithiasis with 8 mm common bile duct dilation. Gallbladder sludge. - GI input appreciated. - Surgery input appreciated. - Lipase down to 169.   - Transaminase now WNL. - Continue clear liquids. - Continue IVF  - Continue famotidine 20 mg IV q12h. - Continue cefepime/metronidazole (day #2). - Continue analgesics prn. Leucocytosis   Urine culture 21:  <1,000 CFU/ML. CXR 21:  Slightly diminished lung volumes and left basilar haziness may represent effusion or atelectasis. No focal consolidation.  - Slight improvement in WBC today. - Continues to have low grade fever 24h Tmax 100.1  - Procalcitonin low at 0.13  - Continue IS.  - Encourage ambulation.  - Abx as noted above. Mild asymptomatic hyponatremia, improved   Hypokalemia   Hypomagnesemia   - Supplement K+ with KCl 20 mEq IV.  - Change IVF to D5NS with 40 mEq KCL at 125 cc/hr. - Monitor. Hypoglycemia   - Continue dextrose in IVF. Anemia   - H/H trending down, possible dilutional component. - Stool guaiac negative.      - Check anemia labs in a.m.  - Consider transfusion for Hgb <7.0  - monitor. Protein calorie malnutrition  - Advance diet as tolerated. 30.0 - 39.9 Obese / Body mass index is 33.73 kg/m². Code status: Full  Prophylaxis: SCDs, LMWH stopped 2/2 gastritis and anemia   Recommended Disposition: Home w/Family     Subjective:     Chief Complaint / Reason for Physician Visit  Continues to complain of burning pain in abdomen. Plan of care and pertinent events reviewed with bedside nurse. Review of Systems:  Symptom Y/N Comments  Symptom Y/N Comments   Fever/Chills Y Low grade  Chest Pain N    Poor Appetite Y   Edema N    Cough N   Abdominal Pain Y    Sputum N   Joint Pain N    SOB/COREA N   Pruritis/Rash N    Nausea/vomit N   Tolerating PT/OT     Diarrhea N   Tolerating Diet Y Clears   Constipation N   Other       Could NOT obtain due to:      Objective:     VITALS:   Last 24hrs VS reviewed since prior progress note. Most recent are:  Patient Vitals for the past 24 hrs:   Temp Pulse Resp BP SpO2   01/29/21 1509 100.1 °F (37.8 °C) 87 16 121/88 94 %   01/29/21 1320 -- -- 18 -- --   01/29/21 1200 99.2 °F (37.3 °C) 85 17 107/68 96 %   01/29/21 0833 99.2 °F (37.3 °C) 91 16 108/71 95 %   01/29/21 0351 97.8 °F (36.6 °C) 77 17 100/67 97 %   01/28/21 2334 (!) 100.7 °F (38.2 °C) (!) 105 18 120/64 98 %   01/28/21 2022 99.4 °F (37.4 °C) (!) 102 17 115/67 94 %       Intake/Output Summary (Last 24 hours) at 1/29/2021 1845  Last data filed at 1/29/2021 5955  Gross per 24 hour   Intake 2200 ml   Output 2400 ml   Net -200 ml        I had a face to face encounter and independently examined this patient on 1/29/2021, as outlined below:  PHYSICAL EXAM:  General:  A/A/O X 3. NAD. HEENT:  Normocephalic. Sclera anicteric. EOMI. Mucous membranes moist.    Chest:  Resps even/unlabored with symmetrical CWE. Air entry full. Lungs CTA. No use of accessory muscles. CV:  RRR. Normal S1/S2. No M/C/R appreciated. No JVD. No peripheral edema. GI:  Abdomen soft/distended. Diffusely TTP with increased TTP over epigastric area. No hernia. ABT X 4.    :  Voiding. Neurologic:  Nonfocal.  CN II-XII grossly intact. Face symmetrical.  Speech normal.     Psych:  Cooperative. No anxiety or agitation. No suicidal/homicidal ideation. Skin:  No rashes or jaundice. Reviewed most current lab test results and cultures  YES  Reviewed most current radiology test results   YES  Review and summation of old records today    NO  Reviewed patient's current orders and MAR    YES  PMH/SH reviewed - no change compared to H&P  ________________________________________________________________________  Care Plan discussed with:    Comments   Patient [address]     Consultant                        Multidiciplinary team rounds were held today with , nursing, pharmacist and clinical coordinator. Patient's plan of care was discussed; medications were reviewed and discharge planning was addressed. ________________________________________________________________________  Mariza Ast, NP     Procedures: see electronic medical records for all procedures/Xrays and details which were not copied into this note but were reviewed prior to creation of Plan. LABS:  I reviewed today's most current labs and imaging studies.   Pertinent labs include:  Recent Labs     01/29/21 0240 01/28/21 0332 01/27/21 0226   WBC 16.1* 19.4* 16.2*   HGB 8.7* 9.3* 9.8*   HCT 26.4* 28.0* 29.8*    244 248     Recent Labs     01/29/21 0240 01/28/21 0332 01/27/21 0226    134* 134*   K 3.3* 3.2* 3.2*    102 100   CO2 27 27 27   * 59* 52*   BUN 3* 3* 4*   CREA 0.39* 0.29* 0.32*   CA 7.6* 7.8* 7.6*   MG 2.0 2.0 1.4*   PHOS  --   --  2.7   ALB 2.0* 2.1* 2.1*   TBILI 1.0 1.8* 1.7*   ALT 34 44 56       Signed: Mariza Ast, NP

## 2021-01-30 LAB
ANION GAP SERPL CALC-SCNC: 5 MMOL/L (ref 5–15)
BASOPHILS # BLD: 0 K/UL (ref 0–0.1)
BASOPHILS NFR BLD: 0 % (ref 0–1)
BUN SERPL-MCNC: 3 MG/DL (ref 6–20)
BUN/CREAT SERPL: 8 (ref 12–20)
CALCIUM SERPL-MCNC: 8 MG/DL (ref 8.5–10.1)
CHLORIDE SERPL-SCNC: 106 MMOL/L (ref 97–108)
CO2 SERPL-SCNC: 25 MMOL/L (ref 21–32)
CREAT SERPL-MCNC: 0.36 MG/DL (ref 0.55–1.02)
DIFFERENTIAL METHOD BLD: ABNORMAL
EOSINOPHIL # BLD: 0.2 K/UL (ref 0–0.4)
EOSINOPHIL NFR BLD: 1 % (ref 0–7)
ERYTHROCYTE [DISTWIDTH] IN BLOOD BY AUTOMATED COUNT: 14.7 % (ref 11.5–14.5)
FERRITIN SERPL-MCNC: 211 NG/ML (ref 8–252)
FOLATE SERPL-MCNC: 6.1 NG/ML (ref 5–21)
GLUCOSE SERPL-MCNC: 113 MG/DL (ref 65–100)
HCT VFR BLD AUTO: 27.1 % (ref 35–47)
HGB BLD-MCNC: 8.8 G/DL (ref 11.5–16)
IMM GRANULOCYTES # BLD AUTO: 0.1 K/UL (ref 0–0.04)
IMM GRANULOCYTES NFR BLD AUTO: 1 % (ref 0–0.5)
IRON SATN MFR SERPL: 6 % (ref 20–50)
IRON SERPL-MCNC: 12 UG/DL (ref 35–150)
LIPASE SERPL-CCNC: 216 U/L (ref 73–393)
LYMPHOCYTES # BLD: 1.2 K/UL (ref 0.8–3.5)
LYMPHOCYTES NFR BLD: 8 % (ref 12–49)
MCH RBC QN AUTO: 27.8 PG (ref 26–34)
MCHC RBC AUTO-ENTMCNC: 32.5 G/DL (ref 30–36.5)
MCV RBC AUTO: 85.8 FL (ref 80–99)
MONOCYTES # BLD: 1.5 K/UL (ref 0–1)
MONOCYTES NFR BLD: 10 % (ref 5–13)
NEUTS SEG # BLD: 12 K/UL (ref 1.8–8)
NEUTS SEG NFR BLD: 80 % (ref 32–75)
NRBC # BLD: 0 K/UL (ref 0–0.01)
NRBC BLD-RTO: 0 PER 100 WBC
PLATELET # BLD AUTO: 330 K/UL (ref 150–400)
PMV BLD AUTO: 11.6 FL (ref 8.9–12.9)
POTASSIUM SERPL-SCNC: 3.5 MMOL/L (ref 3.5–5.1)
RBC # BLD AUTO: 3.16 M/UL (ref 3.8–5.2)
SODIUM SERPL-SCNC: 136 MMOL/L (ref 136–145)
TIBC SERPL-MCNC: 188 UG/DL (ref 250–450)
VIT B12 SERPL-MCNC: 1463 PG/ML (ref 193–986)
WBC # BLD AUTO: 15 K/UL (ref 3.6–11)

## 2021-01-30 PROCEDURE — 85025 COMPLETE CBC W/AUTO DIFF WBC: CPT

## 2021-01-30 PROCEDURE — 83540 ASSAY OF IRON: CPT

## 2021-01-30 PROCEDURE — 83690 ASSAY OF LIPASE: CPT

## 2021-01-30 PROCEDURE — 74011000250 HC RX REV CODE- 250: Performed by: INTERNAL MEDICINE

## 2021-01-30 PROCEDURE — 74011250636 HC RX REV CODE- 250/636: Performed by: STUDENT IN AN ORGANIZED HEALTH CARE EDUCATION/TRAINING PROGRAM

## 2021-01-30 PROCEDURE — 82746 ASSAY OF FOLIC ACID SERUM: CPT

## 2021-01-30 PROCEDURE — 36415 COLL VENOUS BLD VENIPUNCTURE: CPT

## 2021-01-30 PROCEDURE — 82728 ASSAY OF FERRITIN: CPT

## 2021-01-30 PROCEDURE — 74011250636 HC RX REV CODE- 250/636: Performed by: INTERNAL MEDICINE

## 2021-01-30 PROCEDURE — 74011250636 HC RX REV CODE- 250/636: Performed by: NURSE PRACTITIONER

## 2021-01-30 PROCEDURE — 80048 BASIC METABOLIC PNL TOTAL CA: CPT

## 2021-01-30 PROCEDURE — 74011250637 HC RX REV CODE- 250/637: Performed by: STUDENT IN AN ORGANIZED HEALTH CARE EDUCATION/TRAINING PROGRAM

## 2021-01-30 PROCEDURE — 94760 N-INVAS EAR/PLS OXIMETRY 1: CPT

## 2021-01-30 PROCEDURE — 65270000029 HC RM PRIVATE

## 2021-01-30 PROCEDURE — 74011000258 HC RX REV CODE- 258: Performed by: NURSE PRACTITIONER

## 2021-01-30 PROCEDURE — 82607 VITAMIN B-12: CPT

## 2021-01-30 RX ADMIN — METRONIDAZOLE 500 MG: 500 INJECTION, SOLUTION INTRAVENOUS at 05:23

## 2021-01-30 RX ADMIN — METRONIDAZOLE 500 MG: 500 INJECTION, SOLUTION INTRAVENOUS at 18:00

## 2021-01-30 RX ADMIN — HYDROMORPHONE HYDROCHLORIDE 1 MG: 1 INJECTION, SOLUTION INTRAMUSCULAR; INTRAVENOUS; SUBCUTANEOUS at 19:32

## 2021-01-30 RX ADMIN — Medication 10 ML: at 05:23

## 2021-01-30 RX ADMIN — CEFEPIME 2 G: 2 INJECTION, POWDER, FOR SOLUTION INTRAVENOUS at 23:29

## 2021-01-30 RX ADMIN — FAMOTIDINE 20 MG: 10 INJECTION INTRAVENOUS at 21:05

## 2021-01-30 RX ADMIN — Medication 10 ML: at 21:05

## 2021-01-30 RX ADMIN — HYDROMORPHONE HYDROCHLORIDE 1 MG: 1 INJECTION, SOLUTION INTRAMUSCULAR; INTRAVENOUS; SUBCUTANEOUS at 16:07

## 2021-01-30 RX ADMIN — Medication 10 ML: at 14:00

## 2021-01-30 RX ADMIN — FAMOTIDINE 20 MG: 10 INJECTION INTRAVENOUS at 08:08

## 2021-01-30 RX ADMIN — CEFEPIME 2 G: 2 INJECTION, POWDER, FOR SOLUTION INTRAVENOUS at 07:52

## 2021-01-30 RX ADMIN — HYDROMORPHONE HYDROCHLORIDE 1 MG: 1 INJECTION, SOLUTION INTRAMUSCULAR; INTRAVENOUS; SUBCUTANEOUS at 07:52

## 2021-01-30 RX ADMIN — ONDANSETRON 4 MG: 2 INJECTION INTRAMUSCULAR; INTRAVENOUS at 07:52

## 2021-01-30 RX ADMIN — HYDROMORPHONE HYDROCHLORIDE 1 MG: 1 INJECTION, SOLUTION INTRAMUSCULAR; INTRAVENOUS; SUBCUTANEOUS at 23:29

## 2021-01-30 RX ADMIN — CEFEPIME 2 G: 2 INJECTION, POWDER, FOR SOLUTION INTRAVENOUS at 16:06

## 2021-01-30 RX ADMIN — HYDROMORPHONE HYDROCHLORIDE 1 MG: 1 INJECTION, SOLUTION INTRAMUSCULAR; INTRAVENOUS; SUBCUTANEOUS at 02:31

## 2021-01-30 RX ADMIN — HYDROMORPHONE HYDROCHLORIDE 1 MG: 1 INJECTION, SOLUTION INTRAMUSCULAR; INTRAVENOUS; SUBCUTANEOUS at 12:04

## 2021-01-30 RX ADMIN — CEFEPIME 2 G: 2 INJECTION, POWDER, FOR SOLUTION INTRAVENOUS at 00:11

## 2021-01-30 RX ADMIN — ACETAMINOPHEN 650 MG: 325 TABLET ORAL at 12:04

## 2021-01-30 NOTE — PROGRESS NOTES
End of Shift Note    Bedside shift change report given to Chaparrita Estrada (oncoming nurse) by Figueroa Tenorio (offgoing nurse). Report included the following information SBAR, Kardex and MAR    Shift worked:  7a-7p     Shift summary and any significant changes:     pt slept most of day, pain meds q 3-4h. Per pt \" I eating clears after while I feel like I gonna throw up. \" This writer educated on eating very slow and backing way off if feeling nauseous when eating. Pt up to the chair this evening and awake and talking with  who was in room. Concerns for physician to address:  nauseous after eating. Zone phone for oncoming shift:   [phone]       Activity:  Activity Level: Up ad herb  Number times ambulated in hallways past shift: 0  Number of times OOB to chair past shift: 1    Cardiac:   Cardiac Monitoring: No      Cardiac Rhythm: Normal sinus rhythm    Access:   Current line(s): PIV     Genitourinary:   Urinary status: voiding    Respiratory:   O2 Device: Room air  Chronic home O2 use?: NO  Incentive spirometer at bedside: YES     GI:  Last Bowel Movement Date: 01/28/21  Current diet:  DIET CLEAR LIQUID  Passing flatus: YES  Tolerating current diet: YES  % Diet Eaten: 20 %    Pain Management:   Patient states pain is manageable on current regimen: YES    Skin:  Daniel Score: 21  Interventions: increase time out of bed    Patient Safety:  Fall Score:  Total Score: 1  Interventions: gripper socks       Length of Stay:  Expected LOS: 4d 9h  Actual LOS: [facility]

## 2021-01-30 NOTE — PROGRESS NOTES
Hospitalist Progress Note    NAME: Gypsy Osorio   :  1980   MRN:  [id number]     I reviewed pertinent labs/imaging and discussed plan of care with Dr. Megan Elam who is in agreement. Assessment / Plan:  Abdominal pain  Obstructive jaundice s/p ERCP with biliary sphincterotomy, biliary stone removal and balloon sphincteroplasty on   Biliary colic  Post ERCP pancreatitis  Gastritis  CT abdomen/pelvis 21:    Bibasilar subsegmental atelectasis. Gastritis. Fluid surrounding the pancreas and duodenum and biliary system. CT abdomen/pelvis 21:  Imaging findings consistent with worsening changes related to pancreatitis. Increased ascites. Increased inflammatory change adjacent to the pancreas. Persistent gastritis Increased bibasilar atelectasis and pleural effusions. MRCP 21:  8 mm choledocholithiasis with 8 mm common bile duct dilation. Gallbladder sludge. - GI input appreciated. - Surgery input appreciated. - Lipase WNL for past 3 days.     - Transaminase now WNL. - Continue clear liquids, plan per GI is to advance to GI lite tomorrow. - Continue IVF  - Continue famotidine 20 mg IV q12h. - Continue cefepime/metronidazole (day #3). - Continue analgesics prn. Leucocytosis   Urine culture 21:  <1,000 CFU/ML. CXR 21:  Slightly diminished lung volumes and left basilar haziness may represent effusion or atelectasis. No focal consolidation.  - WBC trending down slowly. - Continues to have low grade fever 24h Tmax 100.1  - Procalcitonin low at 0.13  - Continue IS.  - Encourage ambulation.  - Abx as noted above. Mild asymptomatic hyponatremia, resolved  Hypokalemia, improved  Hypomagnesemia   - Continue D5NS with 40 mEq KCL at 125 cc/hr. - Monitor. Hypoglycemia, resolved   - Continue dextrose in IVF. Anemia   - H/H stable over past 24h. - Stool guaiac negative. - Anemia labs reviewed.   - Will hold starting iron until abdominal pain improved and taking solid foods.   - Consider transfusion for Hgb <7.0  - monitor. Protein calorie malnutrition  - Advance diet as tolerated, plan for GI lite tomorrow. 30.0 - 39.9 Obese / Body mass index is 33.73 kg/m². Code status: Full  Prophylaxis: SCDs, LMWH stopped 2/2 gastritis and anemia   Recommended Disposition: Home w/Family     Subjective:     Chief Complaint / Reason for Physician Visit  Continues to complain of burning pain in abdomen. Plan of care and pertinent events reviewed with bedside nurse. Review of Systems:  Symptom Y/N Comments  Symptom Y/N Comments   Fever/Chills Y Low grade  Chest Pain N    Poor Appetite Y   Edema N    Cough N   Abdominal Pain Y    Sputum N   Joint Pain N    SOB/COREA N   Pruritis/Rash N    Nausea/vomit N   Tolerating PT/OT     Diarrhea N   Tolerating Diet Y Clears   Constipation N   Other       Could NOT obtain due to:      Objective:     VITALS:   Last 24hrs VS reviewed since prior progress note. Most recent are:  Patient Vitals for the past 24 hrs:   Temp Pulse Resp BP SpO2   01/30/21 1521 98.6 °F (37 °C) 84 17 123/76 100 %   01/30/21 1416 98.6 °F (37 °C) -- -- -- --   01/30/21 1150 100.3 °F (37.9 °C) 87 18 116/73 99 %   01/30/21 0850 99 °F (37.2 °C) 84 18 118/65 96 %   01/30/21 0247 99.5 °F (37.5 °C) 90 17 119/70 98 %   01/29/21 2347 99 °F (37.2 °C) 88 17 120/69 98 %   01/29/21 2014 99.1 °F (37.3 °C) 94 16 118/68 95 %       Intake/Output Summary (Last 24 hours) at 1/30/2021 1702  Last data filed at 1/30/2021 0813  Gross per 24 hour   Intake 383.33 ml   Output 2900 ml   Net -2516.67 ml        I had a face to face encounter and independently examined this patient on 1/30/2021, as outlined below:  PHYSICAL EXAM:  General:  A/A/O X 3. NAD. HEENT:  Normocephalic. Sclera anicteric. EOMI. Mucous membranes moist.    Chest:  Resps even/unlabored with symmetrical CWE. Air entry full. Lungs CTA. No use of accessory muscles. CV:  RRR. Normal S1/S2. No M/C/R appreciated. No JVD. Trace edema of hands. GI:  Abdomen soft/distended. Diffusely TTP with increased TTP over epigastric area. No hernia. ABT X 4.    :  Voiding. Neurologic:  Nonfocal.  CN II-XII grossly intact. Face symmetrical.  Speech normal.     Psych:  Cooperative. No anxiety or agitation. No suicidal/homicidal ideation. Skin:  No rashes or jaundice. Reviewed most current lab test results and cultures  YES  Reviewed most current radiology test results   YES  Review and summation of old records today    NO  Reviewed patient's current orders and MAR    YES  PMH/SH reviewed - no change compared to H&P  ________________________________________________________________________  Care Plan discussed with:    Comments   Patient [address]     Consultant                        Multidiciplinary team rounds were held today with , nursing, pharmacist and clinical coordinator. Patient's plan of care was discussed; medications were reviewed and discharge planning was addressed. ________________________________________________________________________  Syble Fat, NP     Procedures: see electronic medical records for all procedures/Xrays and details which were not copied into this note but were reviewed prior to creation of Plan. LABS:  I reviewed today's most current labs and imaging studies.   Pertinent labs include:  Recent Labs     01/30/21  0359 01/29/21  0240 01/28/21  0332   WBC 15.0* 16.1* 19.4*   HGB 8.8* 8.7* 9.3*   HCT 27.1* 26.4* 28.0*    293 244     Recent Labs     01/30/21  0359 01/29/21  0240 01/28/21  0332    139 134*   K 3.5 3.3* 3.2*    107 102   CO2 25 27 27   * 135* 59*   BUN 3* 3* 3*   CREA 0.36* 0.39* 0.29*   CA 8.0* 7.6* 7.8*   MG  --  2.0 2.0   ALB  --  2.0* 2.1*   TBILI  --  1.0 1.8*   ALT  --  34 44       Signed: Syble Fat, NP

## 2021-01-30 NOTE — PROGRESS NOTES
IV fluids running 125mL pt voiding 500-1000 q2h. Can we reduce fluids? 1150 temp 100.3 pt given tylenol. Pt ambulating around in room    Pt pain controlled, temp normal.    End of Shift Note    Bedside shift change report given to Michael Wang (oncoming nurse) by Eric Russell (offgoing nurse). Report included the following information SBAR, Kardex and MAR    Shift worked:  7a-7p     Shift summary and any significant changes:     see above     Concerns for physician to address:  none     Zone phone for oncoming shift:   [phone]       Activity:  Activity Level: Up ad herb  Number times ambulated in hallways past shift: 0  Number of times OOB to chair past shift: 2    Cardiac:   Cardiac Monitoring: No      Cardiac Rhythm: Normal sinus rhythm    Access:   Current line(s): PIV     Genitourinary:   Urinary status: voiding    Respiratory:   O2 Device: Room air  Chronic home O2 use?: NO  Incentive spirometer at bedside: YES  Actual Volume (ml): 1500 ml  GI:  Last Bowel Movement Date: 01/28/21  Current diet:  DIET CLEAR LIQUID  Passing flatus: YES  Tolerating current diet: YES  % Diet Eaten: 20 %    Pain Management:   Patient states pain is manageable on current regimen: YES    Skin:  Daniel Score: 20  Interventions: increase time out of bed    Patient Safety:  Fall Score:  Total Score: 1  Interventions: gripper socks       Length of Stay:  Expected LOS: 4d 9h  Actual LOS: [address] [place]

## 2021-01-30 NOTE — PROGRESS NOTES
[address]  [address] [address] [address], [address]   [phone]                GI PROGRESS NOTE      NAME:   Elmer Adan   :    1980   MRN:    [id number]     Assessment/Plan   Acute pancreatitis without necrosis or infection. Continue with a clear liquid diet and advanced to a GI light tomorrow  Monitor clinical course     Patient Active Problem List   Diagnosis Code    Obstructive jaundice K83.1       Subjective:     Elmer Adan is a 36 y.o.  female who was admitted with acute pancreatitis. Her symptoms are improving but the pain is not completely resolved. She had discomfort after she took clear liquids by mouth. Review of Systems    Constitutional: negative fever, negative chills, negative weight loss  Eyes:   negative visual changes  ENT:   negative sore throat, tongue or lip swelling  Respiratory:  negative cough, negative dyspnea  Cards:  negative for chest pain, palpitations, lower extremity edema  GI:   See HPI  :  negative for frequency, dysuria  Integument:  negative for rash and pruritus  Heme:  negative for easy bruising and gum/nose bleeding  Musculoskel: negative for myalgias,  back pain and muscle weakness  Neuro: negative for headaches, dizziness, vertigo  Psych:  negative for feelings of anxiety, depression           Objective:     VITALS:   Last 24hrs VS reviewed since prior hospitalist progress note.  Most recent are:  Visit Vitals  /76   Pulse 84   Temp 98.6 °F (37 °C)   Resp 17   Ht 5' 6\" (1.676 m)   Wt 94.8 kg (209 lb)   SpO2 100%   BMI 33.73 kg/m²       Intake/Output Summary (Last 24 hours) at 2021 1541  Last data filed at 2021 0813  Gross per 24 hour   Intake 383.33 ml   Output 2900 ml   Net -2516.67 ml        PHYSICAL EXAM:  General   well developed, well nourished, appears stated age, in no acute distress  EENT  Normocephalic, Atraumatic, PERRLA, EOMI, sclera clear, nares clear, pharynx normal  Neck Supple without nodes or mass. No thyromegaly or bruit  Respiratory   Clear To Auscultation bilaterally - no wheezes, rales, rhonchi, or crackles  Cardiology  Regular Rate and Rythmn  - no murmurs, rubs or gallops  Abdominal  Soft, non-tender, non-distended, positive bowel sounds, no hepatosplenomegaly, no palpable mass  Extremities  No clubbing, cyanosis, or edema. Pulses intact. Back  No spinal or muscle pain. No CVAT. Skin  Normal skin turgor. No rashes or skin ulcers noted  Neurological  No focal neurological deficits noted  Psychological  Oriented x 3. Normal affect. Lab Data   Recent Results (from the past 12 hour(s))   LIPASE    Collection Time: 01/30/21  3:59 AM   Result Value Ref Range    Lipase 216 73 - 813 U/L   METABOLIC PANEL, BASIC    Collection Time: 01/30/21  3:59 AM   Result Value Ref Range    Sodium 136 136 - 145 mmol/L    Potassium 3.5 3.5 - 5.1 mmol/L    Chloride 106 97 - 108 mmol/L    CO2 25 21 - 32 mmol/L    Anion gap 5 5 - 15 mmol/L    Glucose 113 (H) 65 - 100 mg/dL    BUN 3 (L) 6 - 20 MG/DL    Creatinine 0.36 (L) 0.55 - 1.02 MG/DL    BUN/Creatinine ratio 8 (L) 12 - 20      GFR est AA >60 >60 ml/min/1.73m2    GFR est non-AA >60 >60 ml/min/1.73m2    Calcium 8.0 (L) 8.5 - 10.1 MG/DL   CBC WITH AUTOMATED DIFF    Collection Time: 01/30/21  3:59 AM   Result Value Ref Range    WBC 15.0 (H) 3.6 - 11.0 K/uL    RBC 3.16 (L) 3.80 - 5.20 M/uL    HGB 8.8 (L) 11.5 - 16.0 g/dL    HCT 27.1 (L) 35.0 - 47.0 %    MCV 85.8 80.0 - 99.0 FL    MCH 27.8 26.0 - 34.0 PG    MCHC 32.5 30.0 - 36.5 g/dL    RDW 14.7 (H) 11.5 - 14.5 %    PLATELET 342 070 - 388 K/uL    MPV 11.6 8.9 - 12.9 FL    NRBC 0.0 0  WBC    ABSOLUTE NRBC 0.00 0.00 - 0.01 K/uL    NEUTROPHILS 80 (H) 32 - 75 %    LYMPHOCYTES 8 (L) 12 - 49 %    MONOCYTES 10 5 - 13 %    EOSINOPHILS 1 0 - 7 %    BASOPHILS 0 0 - 1 %    IMMATURE GRANULOCYTES 1 (H) 0.0 - 0.5 %    ABS. NEUTROPHILS 12.0 (H) 1.8 - 8.0 K/UL    ABS.  LYMPHOCYTES 1.2 0.8 - 3.5 K/UL ABS. MONOCYTES 1.5 (H) 0.0 - 1.0 K/UL    ABS. EOSINOPHILS 0.2 0.0 - 0.4 K/UL    ABS. BASOPHILS 0.0 0.0 - 0.1 K/UL    ABS. IMM.  GRANS. 0.1 (H) 0.00 - 0.04 K/UL    DF AUTOMATED     VITAMIN B12    Collection Time: 01/30/21  3:59 AM   Result Value Ref Range    Vitamin B12 1,463 (H) 193 - 986 pg/mL   FOLATE    Collection Time: 01/30/21  3:59 AM   Result Value Ref Range    Folate 6.1 5.0 - 21.0 ng/mL   FERRITIN    Collection Time: 01/30/21  3:59 AM   Result Value Ref Range    Ferritin 211 8 - 252 NG/ML   IRON PROFILE    Collection Time: 01/30/21  3:59 AM   Result Value Ref Range    Iron 12 (L) 35 - 150 ug/dL    TIBC 188 (L) 250 - 450 ug/dL    Iron % saturation 6 (L) 20 - 50 %         Medications: Reviewed    PMH/ reviewed - no change compared to H&P  Attending Physician: Anaya Monroy MD   Date/Time:  1/30/2021

## 2021-01-30 NOTE — PROGRESS NOTES
Problem: Falls - Risk of  Goal: *Absence of Falls  Description: Document Carmen Lobato Fall Risk and appropriate interventions in the flowsheet.   Outcome: Progressing Towards Goal  Note: Fall Risk Interventions:  Mobility Interventions: Patient to call before getting OOB         Medication Interventions: Assess postural VS orthostatic hypotension, Bed/chair exit alarm, Evaluate medications/consider consulting pharmacy, Patient to call before getting OOB, Teach patient to arise slowly                   Problem: Patient Education: Go to Patient Education Activity  Goal: Patient/Family Education  Outcome: Progressing Towards Goal     Problem: Pain  Goal: *Control of Pain  Outcome: Progressing Towards Goal  Goal: *PALLIATIVE CARE:  Alleviation of Pain  Outcome: Progressing Towards Goal

## 2021-01-30 NOTE — PROGRESS NOTES
End of Shift Note    Bedside shift change report given to Isra Coleman (oncoming nurse) by Corbin Ortiz RN (offgoing nurse). Report included the following information SBAR, Kardex, ED Summary, OR Summary, Procedure Summary, Intake/Output, MAR, Accordion and Recent Results    Shift worked:  2149-5941     Shift summary and any significant changes:     No acute issues overnight. VS stable, patient complained of abd pain and was medicated x2 IV dilaudid overnight. Concerns for physician to address:  discharge plan     Zone phone for oncoming shift:   [phone]     Activity:  Activity Level: Up ad herb  Number times ambulated in hallways past shift: 0  Number of times OOB to chair past shift: 1    Cardiac:   Cardiac Monitoring: NO    Cardiac Rhythm: Normal sinus rhythm    Access:   Current line(s): PIV     Genitourinary:   Urinary status: voiding    Respiratory:   O2 Device: Room air  Chronic home O2 use?: NO  Incentive spirometer at bedside: YES     GI:  Last Bowel Movement Date: 01/28/21  Current diet:  DIET CLEAR LIQUID  Passing flatus: YES  Tolerating current diet: YES  % Diet Eaten: 20 %    Pain Management:   Patient states pain is manageable on current regimen: YES    Skin:  Daniel Score: 20  Interventions: increase time out of bed    Patient Safety:  Fall Score:  Total Score: 1  Interventions: gripper socks       Length of Stay:  Expected LOS: 4d 9h  Actual LOS: [facility]

## 2021-01-31 LAB
ANION GAP SERPL CALC-SCNC: 4 MMOL/L (ref 5–15)
BASOPHILS # BLD: 0 K/UL (ref 0–0.1)
BASOPHILS NFR BLD: 0 % (ref 0–1)
BUN SERPL-MCNC: 2 MG/DL (ref 6–20)
BUN/CREAT SERPL: 5 (ref 12–20)
CALCIUM SERPL-MCNC: 8.1 MG/DL (ref 8.5–10.1)
CHLORIDE SERPL-SCNC: 104 MMOL/L (ref 97–108)
CO2 SERPL-SCNC: 26 MMOL/L (ref 21–32)
CREAT SERPL-MCNC: 0.4 MG/DL (ref 0.55–1.02)
DIFFERENTIAL METHOD BLD: ABNORMAL
EOSINOPHIL # BLD: 0.2 K/UL (ref 0–0.4)
EOSINOPHIL NFR BLD: 1 % (ref 0–7)
ERYTHROCYTE [DISTWIDTH] IN BLOOD BY AUTOMATED COUNT: 14.7 % (ref 11.5–14.5)
GLUCOSE SERPL-MCNC: 120 MG/DL (ref 65–100)
HCT VFR BLD AUTO: 27.8 % (ref 35–47)
HGB BLD-MCNC: 8.9 G/DL (ref 11.5–16)
IMM GRANULOCYTES # BLD AUTO: 0.2 K/UL (ref 0–0.04)
IMM GRANULOCYTES NFR BLD AUTO: 1 % (ref 0–0.5)
LIPASE SERPL-CCNC: 360 U/L (ref 73–393)
LYMPHOCYTES # BLD: 1.5 K/UL (ref 0.8–3.5)
LYMPHOCYTES NFR BLD: 11 % (ref 12–49)
MAGNESIUM SERPL-MCNC: 2.2 MG/DL (ref 1.6–2.4)
MCH RBC QN AUTO: 27.1 PG (ref 26–34)
MCHC RBC AUTO-ENTMCNC: 32 G/DL (ref 30–36.5)
MCV RBC AUTO: 84.5 FL (ref 80–99)
MONOCYTES # BLD: 1.5 K/UL (ref 0–1)
MONOCYTES NFR BLD: 11 % (ref 5–13)
NEUTS SEG # BLD: 10.2 K/UL (ref 1.8–8)
NEUTS SEG NFR BLD: 76 % (ref 32–75)
NRBC # BLD: 0 K/UL (ref 0–0.01)
NRBC BLD-RTO: 0 PER 100 WBC
PHOSPHATE SERPL-MCNC: 2.8 MG/DL (ref 2.6–4.7)
PLATELET # BLD AUTO: 361 K/UL (ref 150–400)
PMV BLD AUTO: 10.7 FL (ref 8.9–12.9)
POTASSIUM SERPL-SCNC: 3.8 MMOL/L (ref 3.5–5.1)
RBC # BLD AUTO: 3.29 M/UL (ref 3.8–5.2)
SODIUM SERPL-SCNC: 134 MMOL/L (ref 136–145)
WBC # BLD AUTO: 13.5 K/UL (ref 3.6–11)

## 2021-01-31 PROCEDURE — 36415 COLL VENOUS BLD VENIPUNCTURE: CPT

## 2021-01-31 PROCEDURE — 74011250636 HC RX REV CODE- 250/636: Performed by: STUDENT IN AN ORGANIZED HEALTH CARE EDUCATION/TRAINING PROGRAM

## 2021-01-31 PROCEDURE — 77010033678 HC OXYGEN DAILY

## 2021-01-31 PROCEDURE — 83735 ASSAY OF MAGNESIUM: CPT

## 2021-01-31 PROCEDURE — 65270000029 HC RM PRIVATE

## 2021-01-31 PROCEDURE — 74011250636 HC RX REV CODE- 250/636: Performed by: INTERNAL MEDICINE

## 2021-01-31 PROCEDURE — 84100 ASSAY OF PHOSPHORUS: CPT

## 2021-01-31 PROCEDURE — 74011000250 HC RX REV CODE- 250: Performed by: INTERNAL MEDICINE

## 2021-01-31 PROCEDURE — 85025 COMPLETE CBC W/AUTO DIFF WBC: CPT

## 2021-01-31 PROCEDURE — 74011000258 HC RX REV CODE- 258: Performed by: NURSE PRACTITIONER

## 2021-01-31 PROCEDURE — 83690 ASSAY OF LIPASE: CPT

## 2021-01-31 PROCEDURE — 80048 BASIC METABOLIC PNL TOTAL CA: CPT

## 2021-01-31 PROCEDURE — 74011250636 HC RX REV CODE- 250/636: Performed by: NURSE PRACTITIONER

## 2021-01-31 RX ORDER — SODIUM CHLORIDE 9 MG/ML
75 INJECTION, SOLUTION INTRAVENOUS CONTINUOUS
Status: DISCONTINUED | OUTPATIENT
Start: 2021-01-31 | End: 2021-02-03 | Stop reason: HOSPADM

## 2021-01-31 RX ADMIN — CEFEPIME 2 G: 2 INJECTION, POWDER, FOR SOLUTION INTRAVENOUS at 16:08

## 2021-01-31 RX ADMIN — HYDROMORPHONE HYDROCHLORIDE 1 MG: 1 INJECTION, SOLUTION INTRAMUSCULAR; INTRAVENOUS; SUBCUTANEOUS at 22:04

## 2021-01-31 RX ADMIN — METRONIDAZOLE 500 MG: 500 INJECTION, SOLUTION INTRAVENOUS at 18:49

## 2021-01-31 RX ADMIN — HYDROMORPHONE HYDROCHLORIDE 1 MG: 1 INJECTION, SOLUTION INTRAMUSCULAR; INTRAVENOUS; SUBCUTANEOUS at 18:49

## 2021-01-31 RX ADMIN — FAMOTIDINE 20 MG: 10 INJECTION INTRAVENOUS at 21:09

## 2021-01-31 RX ADMIN — POTASSIUM CHLORIDE, DEXTROSE MONOHYDRATE AND SODIUM CHLORIDE: 300; 5; 450 INJECTION, SOLUTION INTRAVENOUS at 01:39

## 2021-01-31 RX ADMIN — HYDROMORPHONE HYDROCHLORIDE 1 MG: 1 INJECTION, SOLUTION INTRAMUSCULAR; INTRAVENOUS; SUBCUTANEOUS at 05:19

## 2021-01-31 RX ADMIN — ONDANSETRON 4 MG: 2 INJECTION INTRAMUSCULAR; INTRAVENOUS at 05:18

## 2021-01-31 RX ADMIN — SODIUM CHLORIDE 75 ML/HR: 9 INJECTION, SOLUTION INTRAVENOUS at 10:54

## 2021-01-31 RX ADMIN — HYDROMORPHONE HYDROCHLORIDE 1 MG: 1 INJECTION, SOLUTION INTRAMUSCULAR; INTRAVENOUS; SUBCUTANEOUS at 09:27

## 2021-01-31 RX ADMIN — Medication 10 ML: at 21:10

## 2021-01-31 RX ADMIN — METRONIDAZOLE 500 MG: 500 INJECTION, SOLUTION INTRAVENOUS at 05:19

## 2021-01-31 RX ADMIN — FAMOTIDINE 20 MG: 10 INJECTION INTRAVENOUS at 09:28

## 2021-01-31 RX ADMIN — Medication 10 ML: at 12:55

## 2021-01-31 RX ADMIN — ONDANSETRON 4 MG: 2 INJECTION INTRAMUSCULAR; INTRAVENOUS at 18:49

## 2021-01-31 RX ADMIN — ONDANSETRON 4 MG: 2 INJECTION INTRAMUSCULAR; INTRAVENOUS at 12:54

## 2021-01-31 RX ADMIN — HYDROMORPHONE HYDROCHLORIDE 1 MG: 1 INJECTION, SOLUTION INTRAMUSCULAR; INTRAVENOUS; SUBCUTANEOUS at 12:54

## 2021-01-31 RX ADMIN — CEFEPIME 2 G: 2 INJECTION, POWDER, FOR SOLUTION INTRAVENOUS at 23:02

## 2021-01-31 RX ADMIN — Medication 10 ML: at 05:21

## 2021-01-31 RX ADMIN — CEFEPIME 2 G: 2 INJECTION, POWDER, FOR SOLUTION INTRAVENOUS at 09:27

## 2021-01-31 NOTE — PROGRESS NOTES
Hospitalist Progress Note    NAME: Noah Montes   :  1980   MRN:  [id number]     I reviewed pertinent labs/imaging and discussed plan of care with Dr. India Duffy who is in agreement. Assessment / Plan:  Abdominal pain  Obstructive jaundice s/p ERCP with biliary sphincterotomy, biliary stone removal and balloon sphincteroplasty on   Biliary colic  Post ERCP pancreatitis  Gastritis  CT abdomen/pelvis 21:    Bibasilar subsegmental atelectasis. Gastritis. Fluid surrounding the pancreas and duodenum and biliary system. CT abdomen/pelvis 21:  Imaging findings consistent with worsening changes related to pancreatitis. Increased ascites. Increased inflammatory change adjacent to the pancreas. Persistent gastritis Increased bibasilar atelectasis and pleural effusions. MRCP 21:  8 mm choledocholithiasis with 8 mm common bile duct dilation. Gallbladder sludge. - GI input appreciated. - Surgery input appreciated. - Lipase WNL for past 4 days.     - Transaminase now WNL. - Patient reports improvement in abdominal pain. - Advance diet to GI lite. - Decrease IVF rate to 75 cc/hr. - Continue famotidine 20 mg IV q12h. - Continue cefepime/metronidazole (day #4). - Continue analgesics prn. Leucocytosis   Urine culture 21:  <1,000 CFU/ML. Blood culture 21:  NG at 3 days. CXR 21:  Slightly diminished lung volumes and left basilar haziness may represent effusion or atelectasis. No focal consolidation.  - WBC continues downward trend. - Continues to have low grade fever 24h Tmax 99.6  - Procalcitonin low at 0.13  - Continue IS.  - Encourage ambulation.  - Abx as noted above. Mild asymptomatic hyponatremia  Hypokalemia, improved  Hypomagnesemia   - Change IVF to NS at 75 cc/hr. - Monitor. Hypoglycemia, resolved   Mild hyperglycemia   - Remove dextrose from IVF. Anemia   - H/H stable over past 24h. - Stool guaiac negative. - Anemia labs reviewed. - Will hold starting iron until abdominal pain improved and tolerateing solid foods.   - Consider transfusion for Hgb <7.0  - monitor. Protein calorie malnutrition  - Diet advanced to GI lite today. 30.0 - 39.9 Obese / Body mass index is 33.73 kg/m². Code status: Full  Prophylaxis: SCDs, LMWH stopped 2/2 gastritis and anemia   Recommended Disposition: Home w/Family     Subjective:     Chief Complaint / Reason for Physician Visit  Reports improvement in abdominal discomfort today. Plan of care and pertinent events reviewed with bedside nurse. Review of Systems:  Symptom Y/N Comments  Symptom Y/N Comments   Fever/Chills Y Low grade  Chest Pain N    Poor Appetite Y   Edema N    Cough N   Abdominal Pain Y    Sputum N   Joint Pain N    SOB/COREA N   Pruritis/Rash N    Nausea/vomit N   Tolerating PT/OT     Diarrhea N   Tolerating Diet Y    Constipation N   Other       Could NOT obtain due to:      Objective:     VITALS:   Last 24hrs VS reviewed since prior progress note. Most recent are:  Patient Vitals for the past 24 hrs:   Temp Pulse Resp BP SpO2   01/31/21 0755 98.6 °F (37 °C) 87 17 112/68 98 %   01/31/21 0324 99.6 °F (37.6 °C) 88 17 103/66 96 %   01/30/21 2317 99 °F (37.2 °C) 82 17 122/75 96 %   01/30/21 2014 98.8 °F (37.1 °C) 93 18 113/73 97 %   01/30/21 1521 98.6 °F (37 °C) 84 17 123/76 100 %   01/30/21 1416 98.6 °F (37 °C) -- -- -- --   01/30/21 1150 100.3 °F (37.9 °C) 87 18 116/73 99 %   01/30/21 0850 99 °F (37.2 °C) 84 18 118/65 96 %       Intake/Output Summary (Last 24 hours) at 1/31/2021 0844  Last data filed at 1/30/2021 2044  Gross per 24 hour   Intake 1616.67 ml   Output --   Net 1616.67 ml        I had a face to face encounter and independently examined this patient on 1/31/2021, as outlined below:  PHYSICAL EXAM:  General:  A/A/O X 3. NAD. HEENT:  Normocephalic. Sclera anicteric. EOMI.   Mucous membranes moist.    Chest:  Resps even/unlabored with symmetrical CWE. Air entry full. Lungs CTA. No use of accessory muscles. CV:  RRR. Normal S1/S2. No M/C/R appreciated. No JVD. Trace edema of hands. GI:  Abdomen soft/distended. Less TTP. No hernia. ABT X 4.    :  Voiding. Neurologic:  Nonfocal.  CN II-XII grossly intact. Face symmetrical.  Speech normal.     Psych:  Cooperative. No anxiety or agitation. No suicidal/homicidal ideation. Skin:  No rashes or jaundice. Reviewed most current lab test results and cultures  YES  Reviewed most current radiology test results   YES  Review and summation of old records today    NO  Reviewed patient's current orders and MAR    YES  PMH/SH reviewed - no change compared to H&P  ________________________________________________________________________  Care Plan discussed with:    Comments   Patient [address]     Consultant                        Multidiciplinary team rounds were held today with , nursing, pharmacist and clinical coordinator. Patient's plan of care was discussed; medications were reviewed and discharge planning was addressed. ________________________________________________________________________  Nathalie Mitchell NP     Procedures: see electronic medical records for all procedures/Xrays and details which were not copied into this note but were reviewed prior to creation of Plan. LABS:  I reviewed today's most current labs and imaging studies.   Pertinent labs include:  Recent Labs     01/31/21 0331 01/30/21 0359 01/29/21  0240   WBC 13.5* 15.0* 16.1*   HGB 8.9* 8.8* 8.7*   HCT 27.8* 27.1* 26.4*    330 293     Recent Labs     01/31/21 0331 01/30/21  0359 01/29/21  0240   * 136 139   K 3.8 3.5 3.3*    106 107   CO2 26 25 27   * 113* 135*   BUN 2* 3* 3*   CREA 0.40* 0.36* 0.39*   CA 8.1* 8.0* 7.6*   MG 2.2  --  2.0   PHOS 2.8  --   --    ALB  --   --  2.0*   TBILI  --   --  1.0   ALT  --   --  34       Signed: Serafin Persaud, NP

## 2021-01-31 NOTE — PROGRESS NOTES
Problem: Falls - Risk of  Goal: *Absence of Falls  Description: Document Bard Valladares Fall Risk and appropriate interventions in the flowsheet.   Outcome: Progressing Towards Goal  Note: Fall Risk Interventions:  Mobility Interventions: Patient to call before getting OOB         Medication Interventions: Evaluate medications/consider consulting pharmacy                   Problem: Patient Education: Go to Patient Education Activity  Goal: Patient/Family Education  Outcome: Progressing Towards Goal     Problem: Pain  Goal: *Control of Pain  Outcome: Progressing Towards Goal  Goal: *PALLIATIVE CARE:  Alleviation of Pain  Outcome: Progressing Towards Goal

## 2021-01-31 NOTE — PROGRESS NOTES
Per GI recommendation in note from 01/30/21 attempted to advance patient's diet today to GI lite. Patient reports more abdominal pain after eating solid food. Will return to clear liquids.

## 2021-01-31 NOTE — PROGRESS NOTES
Problem: Falls - Risk of  Goal: *Absence of Falls  Description: Document Castro Lobe Fall Risk and appropriate interventions in the flowsheet.   Outcome: Progressing Towards Goal  Note: Fall Risk Interventions:  Mobility Interventions: Patient to call before getting OOB         Medication Interventions: Assess postural VS orthostatic hypotension, Bed/chair exit alarm, Evaluate medications/consider consulting pharmacy, Patient to call before getting OOB, Teach patient to arise slowly                   Problem: Patient Education: Go to Patient Education Activity  Goal: Patient/Family Education  Outcome: Progressing Towards Goal     Problem: Pain  Goal: *Control of Pain  Outcome: Progressing Towards Goal  Goal: *PALLIATIVE CARE:  Alleviation of Pain  Outcome: Progressing Towards Goal

## 2021-01-31 NOTE — PROGRESS NOTES
End of Shift Note    Bedside shift change report given to Jocelyn Menjivar RN (oncoming nurse) by Riaz Ponce RN (offgoing nurse). Report included the following information SBAR, Kardex, ED Summary, OR Summary, Procedure Summary, Intake/Output, MAR, Accordion and Recent Results    Shift worked:  2270-2791     Shift summary and any significant changes:     No acute issues overnight. VS stable, patient complained of abd pain and was medicated x2 IV dilaudid overnight. Concerns for physician to address:  n/a     Zone phone for oncoming shift:   [phone]       Activity:  Activity Level: Up ad herb  Number times ambulated in hallways past shift: 0  Number of times OOB to chair past shift: 1    Cardiac:   Cardiac Monitoring: NO     Cardiac Rhythm: Normal sinus rhythm    Access:   Current line(s): PIV     Genitourinary:   Urinary status: voiding    Respiratory:   O2 Device: Room air  Chronic home O2 use?: NO  Incentive spirometer at bedside: YES  Actual Volume (ml): 1500 ml  GI:  Last Bowel Movement Date: 01/28/21  Current diet:  DIET CLEAR LIQUID  Passing flatus: YES  Tolerating current diet: YES  % Diet Eaten: 20 %    Pain Management:   Patient states pain is manageable on current regimen: YES    Skin:  Daniel Score: 20  Interventions: increase time out of bed    Patient Safety:  Fall Score:  Total Score: 1  Interventions: gripper socks       Length of Stay:  Expected LOS: 4d 9h  Actual LOS: [address], RN

## 2021-01-31 NOTE — PROGRESS NOTES
[address]  [address] [address] [address]arez, [address]  [phone]                GI PROGRESS NOTE      NAME:   Perico West   :    1980   MRN:    [id number]     Assessment/Plan   Acute pancreatitis without necrosis or infection. Advanced to a GI light tomorrow  Monitor clinical course  Dr Kishan Perez or partners to resume care on Monday     Patient Active Problem List   Diagnosis Code    Obstructive jaundice K83.1       Subjective:     Perico West is a 36 y.o.  female who Is feeling better. She was advanced to a GI light diet today. She felt full after eating and had some nausea. She also reports that she gets a lot of hunger pains and gets nauseous if she is hungry. Review of Systems    Constitutional: negative fever, negative chills, negative weight loss  Eyes:   negative visual changes  ENT:   negative sore throat, tongue or lip swelling  Respiratory:  negative cough, negative dyspnea  Cards:  negative for chest pain, palpitations, lower extremity edema  GI:   See HPI  :  negative for frequency, dysuria  Integument:  negative for rash and pruritus  Heme:  negative for easy bruising and gum/nose bleeding  Musculoskel: negative for myalgias,  back pain and muscle weakness  Neuro: negative for headaches, dizziness, vertigo  Psych:  negative for feelings of anxiety, depression           Objective:     VITALS:   Last 24hrs VS reviewed since prior hospitalist progress note.  Most recent are:  Visit Vitals  BP (!) 126/93   Pulse 89   Temp 98.1 °F (36.7 °C)   Resp 17   Ht 5' 6\" (1.676 m)   Wt 94.8 kg (209 lb)   SpO2 98%   BMI 33.73 kg/m²       Intake/Output Summary (Last 24 hours) at 2021 1457  Last data filed at 2021 2044  Gross per 24 hour   Intake 1616.67 ml   Output --   Net 1616.67 ml        PHYSICAL EXAM:  General   well developed, well nourished, appears stated age, in no acute distress  EENT  Normocephalic, Atraumatic, PERRLA, EOMI, sclera clear, nares clear, pharynx normal  Neck   Supple without nodes or mass. No thyromegaly or bruit  Respiratory   Clear To Auscultation bilaterally - no wheezes, rales, rhonchi, or crackles  Cardiology  Regular Rate and Rythmn  - no murmurs, rubs or gallops  Abdominal  Soft, non-tender, non-distended, positive bowel sounds, no hepatosplenomegaly, no palpable mass  Extremities  No clubbing, cyanosis, or edema. Pulses intact. Back  No spinal or muscle pain. No CVAT. Skin  Normal skin turgor. No rashes or skin ulcers noted  Neurological  No focal neurological deficits noted  Psychological  Oriented x 3. Normal affect. Lab Data   Recent Results (from the past 12 hour(s))   LIPASE    Collection Time: 01/31/21  3:31 AM   Result Value Ref Range    Lipase 360 73 - 393 U/L   CBC WITH AUTOMATED DIFF    Collection Time: 01/31/21  3:31 AM   Result Value Ref Range    WBC 13.5 (H) 3.6 - 11.0 K/uL    RBC 3.29 (L) 3.80 - 5.20 M/uL    HGB 8.9 (L) 11.5 - 16.0 g/dL    HCT 27.8 (L) 35.0 - 47.0 %    MCV 84.5 80.0 - 99.0 FL    MCH 27.1 26.0 - 34.0 PG    MCHC 32.0 30.0 - 36.5 g/dL    RDW 14.7 (H) 11.5 - 14.5 %    PLATELET 668 724 - 244 K/uL    MPV 10.7 8.9 - 12.9 FL    NRBC 0.0 0  WBC    ABSOLUTE NRBC 0.00 0.00 - 0.01 K/uL    NEUTROPHILS 76 (H) 32 - 75 %    LYMPHOCYTES 11 (L) 12 - 49 %    MONOCYTES 11 5 - 13 %    EOSINOPHILS 1 0 - 7 %    BASOPHILS 0 0 - 1 %    IMMATURE GRANULOCYTES 1 (H) 0.0 - 0.5 %    ABS. NEUTROPHILS 10.2 (H) 1.8 - 8.0 K/UL    ABS. LYMPHOCYTES 1.5 0.8 - 3.5 K/UL    ABS. MONOCYTES 1.5 (H) 0.0 - 1.0 K/UL    ABS. EOSINOPHILS 0.2 0.0 - 0.4 K/UL    ABS. BASOPHILS 0.0 0.0 - 0.1 K/UL    ABS. IMM.  GRANS. 0.2 (H) 0.00 - 0.04 K/UL    DF AUTOMATED     METABOLIC PANEL, BASIC    Collection Time: 01/31/21  3:31 AM   Result Value Ref Range    Sodium 134 (L) 136 - 145 mmol/L    Potassium 3.8 3.5 - 5.1 mmol/L    Chloride 104 97 - 108 mmol/L    CO2 26 21 - 32 mmol/L    Anion gap 4 (L) 5 - 15 mmol/L    Glucose 120 (H) 65 - 100 mg/dL    BUN 2 (L) 6 - 20 MG/DL    Creatinine 0.40 (L) 0.55 - 1.02 MG/DL    BUN/Creatinine ratio 5 (L) 12 - 20      GFR est AA >60 >60 ml/min/1.73m2    GFR est non-AA >60 >60 ml/min/1.73m2    Calcium 8.1 (L) 8.5 - 10.1 MG/DL   PHOSPHORUS    Collection Time: 01/31/21  3:31 AM   Result Value Ref Range    Phosphorus 2.8 2.6 - 4.7 MG/DL   MAGNESIUM    Collection Time: 01/31/21  3:31 AM   Result Value Ref Range    Magnesium 2.2 1.6 - 2.4 mg/dL         Medications: Reviewed    PMH/ reviewed - no change compared to H&P  Attending Physician: Earlene Madrid MD   Date/Time:  1/31/2021

## 2021-02-01 LAB
ALBUMIN SERPL-MCNC: 2.2 G/DL (ref 3.5–5)
ALBUMIN/GLOB SERPL: 0.6 {RATIO} (ref 1.1–2.2)
ALP SERPL-CCNC: 262 U/L (ref 45–117)
ALT SERPL-CCNC: 21 U/L (ref 12–78)
ANION GAP SERPL CALC-SCNC: 6 MMOL/L (ref 5–15)
AST SERPL-CCNC: 16 U/L (ref 15–37)
BASOPHILS # BLD: 0 K/UL (ref 0–0.1)
BASOPHILS NFR BLD: 0 % (ref 0–1)
BILIRUB SERPL-MCNC: 0.7 MG/DL (ref 0.2–1)
BUN SERPL-MCNC: 4 MG/DL (ref 6–20)
BUN/CREAT SERPL: 11 (ref 12–20)
CALCIUM SERPL-MCNC: 8.3 MG/DL (ref 8.5–10.1)
CHLORIDE SERPL-SCNC: 103 MMOL/L (ref 97–108)
CO2 SERPL-SCNC: 26 MMOL/L (ref 21–32)
CREAT SERPL-MCNC: 0.35 MG/DL (ref 0.55–1.02)
DIFFERENTIAL METHOD BLD: ABNORMAL
EOSINOPHIL # BLD: 0.1 K/UL (ref 0–0.4)
EOSINOPHIL NFR BLD: 1 % (ref 0–7)
ERYTHROCYTE [DISTWIDTH] IN BLOOD BY AUTOMATED COUNT: 14.7 % (ref 11.5–14.5)
GLOBULIN SER CALC-MCNC: 3.8 G/DL (ref 2–4)
GLUCOSE BLD STRIP.AUTO-MCNC: 94 MG/DL (ref 65–100)
GLUCOSE SERPL-MCNC: 90 MG/DL (ref 65–100)
HCT VFR BLD AUTO: 28.7 % (ref 35–47)
HGB BLD-MCNC: 9.3 G/DL (ref 11.5–16)
IMM GRANULOCYTES # BLD AUTO: 0.3 K/UL (ref 0–0.04)
IMM GRANULOCYTES NFR BLD AUTO: 2 % (ref 0–0.5)
LIPASE SERPL-CCNC: 546 U/L (ref 73–393)
LYMPHOCYTES # BLD: 1.9 K/UL (ref 0.8–3.5)
LYMPHOCYTES NFR BLD: 15 % (ref 12–49)
MCH RBC QN AUTO: 27.6 PG (ref 26–34)
MCHC RBC AUTO-ENTMCNC: 32.4 G/DL (ref 30–36.5)
MCV RBC AUTO: 85.2 FL (ref 80–99)
MONOCYTES # BLD: 1.5 K/UL (ref 0–1)
MONOCYTES NFR BLD: 12 % (ref 5–13)
NEUTS SEG # BLD: 8.8 K/UL (ref 1.8–8)
NEUTS SEG NFR BLD: 70 % (ref 32–75)
NRBC # BLD: 0 K/UL (ref 0–0.01)
NRBC BLD-RTO: 0 PER 100 WBC
PLATELET # BLD AUTO: 419 K/UL (ref 150–400)
PMV BLD AUTO: 10.6 FL (ref 8.9–12.9)
POTASSIUM SERPL-SCNC: 3.6 MMOL/L (ref 3.5–5.1)
PROT SERPL-MCNC: 6 G/DL (ref 6.4–8.2)
RBC # BLD AUTO: 3.37 M/UL (ref 3.8–5.2)
RBC MORPH BLD: ABNORMAL
SERVICE CMNT-IMP: NORMAL
SODIUM SERPL-SCNC: 135 MMOL/L (ref 136–145)
WBC # BLD AUTO: 12.6 K/UL (ref 3.6–11)

## 2021-02-01 PROCEDURE — 74011000258 HC RX REV CODE- 258: Performed by: NURSE PRACTITIONER

## 2021-02-01 PROCEDURE — 74011250636 HC RX REV CODE- 250/636: Performed by: INTERNAL MEDICINE

## 2021-02-01 PROCEDURE — 82962 GLUCOSE BLOOD TEST: CPT

## 2021-02-01 PROCEDURE — 74011250636 HC RX REV CODE- 250/636: Performed by: STUDENT IN AN ORGANIZED HEALTH CARE EDUCATION/TRAINING PROGRAM

## 2021-02-01 PROCEDURE — 74011000250 HC RX REV CODE- 250: Performed by: INTERNAL MEDICINE

## 2021-02-01 PROCEDURE — 65270000029 HC RM PRIVATE

## 2021-02-01 PROCEDURE — 85025 COMPLETE CBC W/AUTO DIFF WBC: CPT

## 2021-02-01 PROCEDURE — 74011250636 HC RX REV CODE- 250/636: Performed by: NURSE PRACTITIONER

## 2021-02-01 PROCEDURE — 36415 COLL VENOUS BLD VENIPUNCTURE: CPT

## 2021-02-01 PROCEDURE — 80053 COMPREHEN METABOLIC PANEL: CPT

## 2021-02-01 PROCEDURE — 83690 ASSAY OF LIPASE: CPT

## 2021-02-01 RX ADMIN — SODIUM CHLORIDE 75 ML/HR: 9 INJECTION, SOLUTION INTRAVENOUS at 16:35

## 2021-02-01 RX ADMIN — HYDROMORPHONE HYDROCHLORIDE 1 MG: 1 INJECTION, SOLUTION INTRAMUSCULAR; INTRAVENOUS; SUBCUTANEOUS at 11:52

## 2021-02-01 RX ADMIN — FAMOTIDINE 20 MG: 10 INJECTION INTRAVENOUS at 08:29

## 2021-02-01 RX ADMIN — METRONIDAZOLE 500 MG: 500 INJECTION, SOLUTION INTRAVENOUS at 18:13

## 2021-02-01 RX ADMIN — ONDANSETRON 4 MG: 2 INJECTION INTRAMUSCULAR; INTRAVENOUS at 16:35

## 2021-02-01 RX ADMIN — CEFEPIME 2 G: 2 INJECTION, POWDER, FOR SOLUTION INTRAVENOUS at 23:53

## 2021-02-01 RX ADMIN — HYDROMORPHONE HYDROCHLORIDE 1 MG: 1 INJECTION, SOLUTION INTRAMUSCULAR; INTRAVENOUS; SUBCUTANEOUS at 21:14

## 2021-02-01 RX ADMIN — CEFEPIME 2 G: 2 INJECTION, POWDER, FOR SOLUTION INTRAVENOUS at 08:29

## 2021-02-01 RX ADMIN — METRONIDAZOLE 500 MG: 500 INJECTION, SOLUTION INTRAVENOUS at 05:15

## 2021-02-01 RX ADMIN — ONDANSETRON 4 MG: 2 INJECTION INTRAMUSCULAR; INTRAVENOUS at 21:14

## 2021-02-01 RX ADMIN — CEFEPIME 2 G: 2 INJECTION, POWDER, FOR SOLUTION INTRAVENOUS at 16:35

## 2021-02-01 RX ADMIN — HYDROMORPHONE HYDROCHLORIDE 1 MG: 1 INJECTION, SOLUTION INTRAMUSCULAR; INTRAVENOUS; SUBCUTANEOUS at 08:37

## 2021-02-01 RX ADMIN — Medication 20 ML: at 21:14

## 2021-02-01 RX ADMIN — SODIUM CHLORIDE 75 ML/HR: 9 INJECTION, SOLUTION INTRAVENOUS at 01:57

## 2021-02-01 RX ADMIN — Medication 10 ML: at 05:15

## 2021-02-01 RX ADMIN — Medication 10 ML: at 16:35

## 2021-02-01 RX ADMIN — HYDROMORPHONE HYDROCHLORIDE 1 MG: 1 INJECTION, SOLUTION INTRAMUSCULAR; INTRAVENOUS; SUBCUTANEOUS at 01:51

## 2021-02-01 RX ADMIN — HYDROMORPHONE HYDROCHLORIDE 1 MG: 1 INJECTION, SOLUTION INTRAMUSCULAR; INTRAVENOUS; SUBCUTANEOUS at 16:36

## 2021-02-01 RX ADMIN — FAMOTIDINE 20 MG: 10 INJECTION INTRAVENOUS at 21:14

## 2021-02-01 NOTE — PROGRESS NOTES
Problem: Falls - Risk of  Goal: *Absence of Falls  Description: Document Nona Motta Fall Risk and appropriate interventions in the flowsheet.   Outcome: Progressing Towards Goal  Note: Fall Risk Interventions:  Mobility Interventions: Patient to call before getting OOB         Medication Interventions: Evaluate medications/consider consulting pharmacy

## 2021-02-01 NOTE — PROGRESS NOTES
End of Shift Note    Bedside shift change report given to Kelly Payton  (oncoming nurse) by Jack Ibarra RN (offgoing nurse). Report included the following information SBAR, Kardex, MAR and Recent Results    Shift worked:  07:00-19:00     Shift summary and any significant changes:    Patient quite nauseated and in pain today. Patient could not tolerate gi lite diet. Concerns for physician to address:  Please review pain medications. The patient may need different medications     Zone phone for oncoming shift:   [phone]       Activity:  Activity Level: Up ad herb  Number times ambulated in hallways past shift: 0  Number of times OOB to chair past shift: 5    Cardiac:   Cardiac Monitoring: No        Access:   Current line(s): PIV     Genitourinary:   Urinary status: voiding    Respiratory:   O2 Device: Room air  Chronic home O2 use?: N/A  Incentive spirometer at bedside: N/A  Actual Volume (ml): 1500 ml  GI:  Last Bowel Movement Date: 01/28/21  Current diet:  DIET CLEAR LIQUID  Passing flatus: YES  Tolerating current diet: NO  % Diet Eaten: 20 %    Pain Management:   Patient states pain is manageable on current regimen: NO    Skin:  Daniel Score: 21  Interventions: increase time out of bed    Patient Safety:  Fall Score:  Total Score: 1  Interventions:       Length of Stay:  Expected LOS: 4d 9h  Actual LOS: 9      Jack Ibarra RN

## 2021-02-01 NOTE — PROGRESS NOTES
Hospitalist Progress Note    NAME: Noah Montes   :  1980   MRN:  [id number]     I reviewed pertinent labs/imaging and discussed plan of care with Dr. India Duffy who is in agreement. Assessment / Plan:  Abdominal pain  Obstructive jaundice s/p ERCP with biliary sphincterotomy, biliary stone removal and balloon sphincteroplasty on 68  Biliary colic  Post ERCP pancreatitis  Gastritis  CT abdomen/pelvis 21:    Bibasilar subsegmental atelectasis. Gastritis. Fluid surrounding the pancreas and duodenum and biliary system. CT abdomen/pelvis 21:  Imaging findings consistent with worsening changes related to pancreatitis. Increased ascites. Increased inflammatory change adjacent to the pancreas. Persistent gastritis Increased bibasilar atelectasis and pleural effusions. MRCP 21:  8 mm choledocholithiasis with 8 mm common bile duct dilation. Gallbladder sludge. - GI input appreciated. - Surgery input appreciated. - Lipase bump today after trial of solid food. - Transaminase remain WNL. - Returned to clear liquids, continue for 1-2 days. - Continue IVF at 75 cc/hr. - Continue famotidine 20 mg IV q12h. - Continue cefepime/metronidazole (day #5). - Continue analgesics prn. Leucocytosis   Urine culture 21:  <1,000 CFU/ML. Blood culture 21:  NG at 4 days. CXR 21:  Slightly diminished lung volumes and left basilar haziness may represent effusion or atelectasis. No focal consolidation.  - WBC continues downward trend. - Continues to have low grade fever 24h Tmax 99.1  - Procalcitonin low at 0.13  - Continue IS, reinforced need to complete q1h.  - Patient again encouraged to ambulate. - Abx as noted above. Mild asymptomatic hyponatremia, improved  Hypokalemia, resolved  Hypomagnesemia, resolved   - Continue NS at 75 cc/hr,.   - Monitor. Hypoglycemia, resolved   Mild hyperglycemia   - Monitor with a.m. labs.    Anemia  Thrombocytosis   - H/H overall stable. - No sign of thrombosis. - Stool guaiac negative. - Anemia labs reviewed. - Will hold starting iron until abdominal pain improved and tolerateing solid foods.   - Consider transfusion for Hgb <7.0  - monitor. Protein calorie malnutrition  - Advance diet as able. 30.0 - 39.9 Obese / Body mass index is 33.73 kg/m². Code status: Full  Prophylaxis: SCDs, LMWH stopped 2/2 gastritis and anemia   Recommended Disposition: Home w/Family     Subjective:     Chief Complaint / Reason for Physician Visit  Abdominal pain worse yesterday after trial of solid food. Plan of care and pertinent events reviewed with bedside nurse. Review of Systems:  Symptom Y/N Comments  Symptom Y/N Comments   Fever/Chills Y Low grade  Chest Pain N    Poor Appetite Y   Edema N    Cough N   Abdominal Pain Y    Sputum N   Joint Pain N    SOB/COREA N   Pruritis/Rash N    Nausea/vomit N   Tolerating PT/OT     Diarrhea N   Tolerating Diet Y    Constipation N   Other       Could NOT obtain due to:      Objective:     VITALS:   Last 24hrs VS reviewed since prior progress note. Most recent are:  Patient Vitals for the past 24 hrs:   Temp Pulse Resp BP SpO2   02/01/21 1135 99.1 °F (37.3 °C) 82 18 111/68 94 %   02/01/21 0804 99.4 °F (37.4 °C) 95 18 111/65 97 %   02/01/21 0204 98.2 °F (36.8 °C) 76 18 114/74 95 %   01/31/21 2306 98 °F (36.7 °C) 85 20 118/69 96 %   01/31/21 1951 98.2 °F (36.8 °C) 92 18 123/84 94 %   01/31/21 1522 98.6 °F (37 °C) 93 17 130/74 97 %     No intake or output data in the 24 hours ending 02/01/21 1233     I had a face to face encounter and independently examined this patient on 2/1/2021, as outlined below:  PHYSICAL EXAM:  General:  A/A/O X 3. NAD. HEENT:  Normocephalic. Sclera anicteric. EOMI. Mucous membranes moist.    Chest:  Resps even/unlabored with symmetrical CWE. Air entry full. Lungs CTA. No use of accessory muscles. CV:  RRR. Normal S1/S2. No M/C/R appreciated. No JVD. Trace edema of hands. GI:  Abdomen soft/distended. Less TTP. No hernia. ABT X 4.    :  Voiding. Neurologic:  Nonfocal.  CN II-XII grossly intact. Face symmetrical.  Speech normal.     Psych:  Cooperative. No anxiety or agitation. No suicidal/homicidal ideation. Skin:  No rashes or jaundice. Reviewed most current lab test results and cultures  YES  Reviewed most current radiology test results   YES  Review and summation of old records today    NO  Reviewed patient's current orders and MAR    YES  PMH/SH reviewed - no change compared to H&P  ________________________________________________________________________  Care Plan discussed with:    Comments   Patient [address]     Consultant                        Multidiciplinary team rounds were held today with , nursing, pharmacist and clinical coordinator. Patient's plan of care was discussed; medications were reviewed and discharge planning was addressed. ________________________________________________________________________  Sacha Perdomo NP     Procedures: see electronic medical records for all procedures/Xrays and details which were not copied into this note but were reviewed prior to creation of Plan. LABS:  I reviewed today's most current labs and imaging studies.   Pertinent labs include:  Recent Labs     02/01/21  0159 01/31/21  0331 01/30/21  0359   WBC 12.6* 13.5* 15.0*   HGB 9.3* 8.9* 8.8*   HCT 28.7* 27.8* 27.1*   * 361 330     Recent Labs     02/01/21  0159 01/31/21  0331 01/30/21  0359   * 134* 136   K 3.6 3.8 3.5    104 106   CO2 26 26 25   GLU 90 120* 113*   BUN 4* 2* 3*   CREA 0.35* 0.40* 0.36*   CA 8.3* 8.1* 8.0*   MG  --  2.2  --    PHOS  --  2.8  --    ALB 2.2*  --   --    TBILI 0.7  --   --    ALT 21  --   --        Signed: Sacha Perdomo, NP

## 2021-02-01 NOTE — PROGRESS NOTES
GI PROGRESS NOTE      NAME:   Shameka Basurto   :    1980   MRN:    [id number]     Assessment/Plan   · Acute pancreatitis without necrosis or infection. · We will keep her on a liquid diet x 36 hours or so  · Monitor clinical course. Mild lipase bump noted with PO solids. · Plan d/w pt and  at bedside. · Ambulation encouraged. Patient Active Problem List   Diagnosis Code    Obstructive jaundice K83.1       Subjective:     Shameka Basurto is a 36 y.o.  female who Is feeling better. She felt full after eating and had nausea. Taking pain meds every 6 hours or so. Review of Systems    Constitutional: negative fever, negative chills, negative weight loss  Eyes:   negative visual changes  ENT:   negative sore throat, tongue or lip swelling  Respiratory:  negative cough, negative dyspnea  Cards:  negative for chest pain, palpitations, lower extremity edema  GI:   See HPI  :  negative for frequency, dysuria  Integument:  negative for rash and pruritus  Heme:  negative for easy bruising and gum/nose bleeding  Musculoskel: negative for myalgias,  back pain and muscle weakness  Neuro: negative for headaches, dizziness, vertigo  Psych:  negative for feelings of anxiety, depression           Objective:     VITALS:   Last 24hrs VS reviewed since prior hospitalist progress note. Most recent are:  Visit Vitals  /65   Pulse 95   Temp 99.4 °F (37.4 °C)   Resp 18   Ht 5' 6\" (1.676 m)   Wt 94.8 kg (209 lb)   SpO2 97%   BMI 33.73 kg/m²     No intake or output data in the 24 hours ending 21 1002     PHYSICAL EXAM:  General   well developed, well nourished, appears stated age, in no acute distress  EENT  Normocephalic  Neck   Supple without nodes or mass. No thyromegaly or bruit  Respiratory   Clear To Auscultation bilaterally -   Cardiology  Regular Rate and Rythmn  -   Abdominal  Soft, mild LUQ pain  Extremities  No clubbing, cyanosis, or edema.  Pulses intact. Skin  Normal skin turgor. Neurological  No focal neurological deficits noted  Psychological  Oriented x 3. Normal affect. Lab Data   Recent Results (from the past 12 hour(s))   LIPASE    Collection Time: 02/01/21  1:59 AM   Result Value Ref Range    Lipase 546 (H) 73 - 162 U/L   METABOLIC PANEL, COMPREHENSIVE    Collection Time: 02/01/21  1:59 AM   Result Value Ref Range    Sodium 135 (L) 136 - 145 mmol/L    Potassium 3.6 3.5 - 5.1 mmol/L    Chloride 103 97 - 108 mmol/L    CO2 26 21 - 32 mmol/L    Anion gap 6 5 - 15 mmol/L    Glucose 90 65 - 100 mg/dL    BUN 4 (L) 6 - 20 MG/DL    Creatinine 0.35 (L) 0.55 - 1.02 MG/DL    BUN/Creatinine ratio 11 (L) 12 - 20      GFR est AA >60 >60 ml/min/1.73m2    GFR est non-AA >60 >60 ml/min/1.73m2    Calcium 8.3 (L) 8.5 - 10.1 MG/DL    Bilirubin, total 0.7 0.2 - 1.0 MG/DL    ALT (SGPT) 21 12 - 78 U/L    AST (SGOT) 16 15 - 37 U/L    Alk. phosphatase 262 (H) 45 - 117 U/L    Protein, total 6.0 (L) 6.4 - 8.2 g/dL    Albumin 2.2 (L) 3.5 - 5.0 g/dL    Globulin 3.8 2.0 - 4.0 g/dL    A-G Ratio 0.6 (L) 1.1 - 2.2     CBC WITH AUTOMATED DIFF    Collection Time: 02/01/21  1:59 AM   Result Value Ref Range    WBC 12.6 (H) 3.6 - 11.0 K/uL    RBC 3.37 (L) 3.80 - 5.20 M/uL    HGB 9.3 (L) 11.5 - 16.0 g/dL    HCT 28.7 (L) 35.0 - 47.0 %    MCV 85.2 80.0 - 99.0 FL    MCH 27.6 26.0 - 34.0 PG    MCHC 32.4 30.0 - 36.5 g/dL    RDW 14.7 (H) 11.5 - 14.5 %    PLATELET 322 (H) 343 - 400 K/uL    MPV 10.6 8.9 - 12.9 FL    NRBC 0.0 0  WBC    ABSOLUTE NRBC 0.00 0.00 - 0.01 K/uL    NEUTROPHILS 70 32 - 75 %    LYMPHOCYTES 15 12 - 49 %    MONOCYTES 12 5 - 13 %    EOSINOPHILS 1 0 - 7 %    BASOPHILS 0 0 - 1 %    IMMATURE GRANULOCYTES 2 (H) 0.0 - 0.5 %    ABS. NEUTROPHILS 8.8 (H) 1.8 - 8.0 K/UL    ABS. LYMPHOCYTES 1.9 0.8 - 3.5 K/UL    ABS. MONOCYTES 1.5 (H) 0.0 - 1.0 K/UL    ABS. EOSINOPHILS 0.1 0.0 - 0.4 K/UL    ABS. BASOPHILS 0.0 0.0 - 0.1 K/UL    ABS. IMM.  GRANS. 0.3 (H) 0.00 - 0.04 K/UL DF SMEAR SCANNED      RBC COMMENTS NORMOCYTIC, NORMOCHROMIC           Medications: Reviewed    PMH/SH reviewed - no change compared to H&P  Attending Physician: Hali Smallwood MD   Date/Time:  2/1/2021

## 2021-02-01 NOTE — PROGRESS NOTES
Problem: Falls - Risk of  Goal: *Absence of Falls  Description: Document Serene Segundo Fall Risk and appropriate interventions in the flowsheet.   Outcome: Progressing Towards Goal  Note: Fall Risk Interventions:  Mobility Interventions: Patient to call before getting OOB         Medication Interventions: Teach patient to arise slowly                   Problem: Patient Education: Go to Patient Education Activity  Goal: Patient/Family Education  Outcome: Progressing Towards Goal     Problem: Pain  Goal: *Control of Pain  Outcome: Progressing Towards Goal  Goal: *PALLIATIVE CARE:  Alleviation of Pain  Outcome: Progressing Towards Goal

## 2021-02-01 NOTE — PROGRESS NOTES
Comprehensive Nutrition Assessment    Type and Reason for Visit: Initial, RD nutrition re-screen/LOS    Nutrition Recommendations/Plan:   · Continue diet progression beyond clear liquids per GI. · Add Ensure Clear to assist with meeting nutritional needs while on liquids. · Please document % meals and supplements consumed in flowsheet I/O's under intake. Nutrition Assessment:     2/1: Chart reviewed; med noted for obstructive jaundice related to acute pancreatitis. Pt's diet was progressed over the weekend to GI Lite but lipase increased with nausea so regressed back to clear liquids. GI plans to continue liquids for the next 36 hours and try to progress again. No data found. Last Weight Metric  Weight Loss Metrics 1/22/2021 11/11/2013   Today's Wt 209 lb 209 lb   BMI 33.73 kg/m2 35.86 kg/m2       Estimated Daily Nutrient Needs:  Energy (kcal): 2119 (BMR 1630 x 1. 3AF); Weight Used for Energy Requirements: Current  Protein (g): 95 (1.0 g/kg bw); Weight Used for Protein Requirements: Current  Fluid (ml/day): 2100 ml/day; Method Used for Fluid Requirements: 1 ml/kcal    Nutrition Related Findings:  Lipase 546      Wounds:    None       Current Nutrition Therapies:  DIET CLEAR LIQUID    Anthropometric Measures:  · Height:  5' 6\" (167.6 cm)  · Current Body Wt:  94.8 kg (208 lb 15.9 oz)   · Ideal Body Wt:  130 lbs:  160.8 %   · BMI Category:  Obese class 1 (BMI 30.0-34. 9)       Nutrition Diagnosis:   · Inadequate protein-energy intake related to (acute pancreatitis) as evidenced by NPO or clear liquid status due to medical condition    Nutrition Interventions:   Food and/or Nutrient Delivery: Continue current diet(diet as tolerated per GI)  Nutrition Education and Counseling: No recommendations at this time  Coordination of Nutrition Care: No recommendation at this time    Goals:   Tolerate diet with po at least 50% of meals next 3-5 days       Nutrition Monitoring and Evaluation:   Behavioral-Environmental Outcomes: None identified  Food/Nutrient Intake Outcomes: Diet advancement/tolerance, Food and nutrient intake  Physical Signs/Symptoms Outcomes: Biochemical data, GI status, Weight    Discharge Planning:    Continue current diet     Electronically signed by Sixto Cruz RD on 2/1/2021 at 10:31 AM

## 2021-02-01 NOTE — PROGRESS NOTES
1900 Received report from [facility]. Assumed care of patient. End of Shift Note    Bedside shift change report given to  (oncoming nurse) by Blaire Gonzalez RN (offgoing nurse). Report included the following information SBAR    Shift worked:  7pm-7am     Shift summary and any significant changes:     Uneventful night. Pt medicated with dilaudid twice during shift for abdominal pain. Concerns for physician to address:       Zone phone for oncoming shift:          Activity:  Activity Level: Up ad herb  Number times ambulated in hallways past shift: 0  Number of times OOB to chair past shift: 0    Cardiac:   Cardiac Monitoring: Yes      Cardiac Rhythm: Normal sinus rhythm    Access:   Current line(s): PIV     Genitourinary:   Urinary status: voiding    Respiratory:   O2 Device: Room air  Chronic home O2 use?: NO  Incentive spirometer at bedside: YES  Actual Volume (ml): 1500 ml  GI:  Last Bowel Movement Date: 01/28/21  Current diet:  DIET CLEAR LIQUID  Passing flatus: YES  Tolerating current diet: YES  % Diet Eaten: 20 %    Pain Management:   Patient states pain is manageable on current regimen: YES    Skin:  Daniel Score: 21  Interventions: increase time out of bed    Patient Safety:  Fall Score:  Total Score: 1  Interventions: gripper socks       Length of Stay:  Expected LOS: 4d 9h  Actual LOS: [address] Omkar Clifton, RN

## 2021-02-02 LAB
ANION GAP SERPL CALC-SCNC: 6 MMOL/L (ref 5–15)
BACTERIA SPEC CULT: NORMAL
BASOPHILS # BLD: 0 K/UL (ref 0–0.1)
BASOPHILS NFR BLD: 0 % (ref 0–1)
BUN SERPL-MCNC: 4 MG/DL (ref 6–20)
BUN/CREAT SERPL: 11 (ref 12–20)
CALCIUM SERPL-MCNC: 8.3 MG/DL (ref 8.5–10.1)
CHLORIDE SERPL-SCNC: 103 MMOL/L (ref 97–108)
CO2 SERPL-SCNC: 26 MMOL/L (ref 21–32)
CREAT SERPL-MCNC: 0.37 MG/DL (ref 0.55–1.02)
DIFFERENTIAL METHOD BLD: ABNORMAL
EOSINOPHIL # BLD: 0.1 K/UL (ref 0–0.4)
EOSINOPHIL NFR BLD: 1 % (ref 0–7)
ERYTHROCYTE [DISTWIDTH] IN BLOOD BY AUTOMATED COUNT: 14.6 % (ref 11.5–14.5)
GLUCOSE SERPL-MCNC: 85 MG/DL (ref 65–100)
HCT VFR BLD AUTO: 29.8 % (ref 35–47)
HGB BLD-MCNC: 9.6 G/DL (ref 11.5–16)
IMM GRANULOCYTES # BLD AUTO: 0 K/UL (ref 0–0.04)
IMM GRANULOCYTES NFR BLD AUTO: 0 % (ref 0–0.5)
LIPASE SERPL-CCNC: 559 U/L (ref 73–393)
LYMPHOCYTES # BLD: 0.9 K/UL (ref 0.8–3.5)
LYMPHOCYTES NFR BLD: 10 % (ref 12–49)
MAGNESIUM SERPL-MCNC: 2.2 MG/DL (ref 1.6–2.4)
MCH RBC QN AUTO: 27.2 PG (ref 26–34)
MCHC RBC AUTO-ENTMCNC: 32.2 G/DL (ref 30–36.5)
MCV RBC AUTO: 84.4 FL (ref 80–99)
METAMYELOCYTES NFR BLD MANUAL: 1 %
MONOCYTES # BLD: 0.4 K/UL (ref 0–1)
MONOCYTES NFR BLD: 5 % (ref 5–13)
NEUTS BAND NFR BLD MANUAL: 1 %
NEUTS SEG # BLD: 7.4 K/UL (ref 1.8–8)
NEUTS SEG NFR BLD: 82 % (ref 32–75)
NRBC # BLD: 0 K/UL (ref 0–0.01)
NRBC BLD-RTO: 0 PER 100 WBC
PLATELET # BLD AUTO: 459 K/UL (ref 150–400)
PMV BLD AUTO: 10.5 FL (ref 8.9–12.9)
POTASSIUM SERPL-SCNC: 3.5 MMOL/L (ref 3.5–5.1)
RBC # BLD AUTO: 3.53 M/UL (ref 3.8–5.2)
RBC MORPH BLD: ABNORMAL
SERVICE CMNT-IMP: NORMAL
SODIUM SERPL-SCNC: 135 MMOL/L (ref 136–145)
WBC # BLD AUTO: 8.9 K/UL (ref 3.6–11)

## 2021-02-02 PROCEDURE — 80048 BASIC METABOLIC PNL TOTAL CA: CPT

## 2021-02-02 PROCEDURE — 83735 ASSAY OF MAGNESIUM: CPT

## 2021-02-02 PROCEDURE — 74011000258 HC RX REV CODE- 258: Performed by: NURSE PRACTITIONER

## 2021-02-02 PROCEDURE — 85025 COMPLETE CBC W/AUTO DIFF WBC: CPT

## 2021-02-02 PROCEDURE — 74011250636 HC RX REV CODE- 250/636: Performed by: INTERNAL MEDICINE

## 2021-02-02 PROCEDURE — 83690 ASSAY OF LIPASE: CPT

## 2021-02-02 PROCEDURE — 74011000250 HC RX REV CODE- 250: Performed by: INTERNAL MEDICINE

## 2021-02-02 PROCEDURE — 36415 COLL VENOUS BLD VENIPUNCTURE: CPT

## 2021-02-02 PROCEDURE — 74011250636 HC RX REV CODE- 250/636: Performed by: STUDENT IN AN ORGANIZED HEALTH CARE EDUCATION/TRAINING PROGRAM

## 2021-02-02 PROCEDURE — 65270000029 HC RM PRIVATE

## 2021-02-02 PROCEDURE — 74011250636 HC RX REV CODE- 250/636: Performed by: NURSE PRACTITIONER

## 2021-02-02 RX ADMIN — HYDROMORPHONE HYDROCHLORIDE 1 MG: 1 INJECTION, SOLUTION INTRAMUSCULAR; INTRAVENOUS; SUBCUTANEOUS at 04:40

## 2021-02-02 RX ADMIN — HYDROMORPHONE HYDROCHLORIDE 1 MG: 1 INJECTION, SOLUTION INTRAMUSCULAR; INTRAVENOUS; SUBCUTANEOUS at 10:40

## 2021-02-02 RX ADMIN — HYDROMORPHONE HYDROCHLORIDE 1 MG: 1 INJECTION, SOLUTION INTRAMUSCULAR; INTRAVENOUS; SUBCUTANEOUS at 22:14

## 2021-02-02 RX ADMIN — Medication 10 ML: at 21:57

## 2021-02-02 RX ADMIN — FAMOTIDINE 20 MG: 10 INJECTION INTRAVENOUS at 21:57

## 2021-02-02 RX ADMIN — Medication 10 ML: at 14:49

## 2021-02-02 RX ADMIN — Medication 10 ML: at 01:17

## 2021-02-02 RX ADMIN — Medication 10 ML: at 04:40

## 2021-02-02 RX ADMIN — HYDROMORPHONE HYDROCHLORIDE 1 MG: 1 INJECTION, SOLUTION INTRAMUSCULAR; INTRAVENOUS; SUBCUTANEOUS at 01:17

## 2021-02-02 RX ADMIN — ONDANSETRON 4 MG: 2 INJECTION INTRAMUSCULAR; INTRAVENOUS at 04:40

## 2021-02-02 RX ADMIN — Medication 20 ML: at 22:15

## 2021-02-02 RX ADMIN — ONDANSETRON 4 MG: 2 INJECTION INTRAMUSCULAR; INTRAVENOUS at 22:14

## 2021-02-02 RX ADMIN — METRONIDAZOLE 500 MG: 500 INJECTION, SOLUTION INTRAVENOUS at 17:13

## 2021-02-02 RX ADMIN — HYDROMORPHONE HYDROCHLORIDE 1 MG: 1 INJECTION, SOLUTION INTRAMUSCULAR; INTRAVENOUS; SUBCUTANEOUS at 15:45

## 2021-02-02 RX ADMIN — Medication 10 ML: at 05:06

## 2021-02-02 RX ADMIN — ONDANSETRON 4 MG: 2 INJECTION INTRAMUSCULAR; INTRAVENOUS at 14:48

## 2021-02-02 RX ADMIN — FAMOTIDINE 20 MG: 10 INJECTION INTRAVENOUS at 08:12

## 2021-02-02 RX ADMIN — METRONIDAZOLE 500 MG: 500 INJECTION, SOLUTION INTRAVENOUS at 05:04

## 2021-02-02 RX ADMIN — CEFEPIME 2 G: 2 INJECTION, POWDER, FOR SOLUTION INTRAVENOUS at 08:12

## 2021-02-02 RX ADMIN — HYDROMORPHONE HYDROCHLORIDE 1 MG: 1 INJECTION, SOLUTION INTRAMUSCULAR; INTRAVENOUS; SUBCUTANEOUS at 19:22

## 2021-02-02 RX ADMIN — CEFEPIME 2 G: 2 INJECTION, POWDER, FOR SOLUTION INTRAVENOUS at 15:45

## 2021-02-02 RX ADMIN — Medication 10 ML: at 19:22

## 2021-02-02 RX ADMIN — SODIUM CHLORIDE 75 ML/HR: 9 INJECTION, SOLUTION INTRAVENOUS at 08:13

## 2021-02-02 NOTE — PROGRESS NOTES
Hospitalist Progress Note    NAME: Maday Jaime   :  1980   MRN:  [id number]     I reviewed pertinent labs and imaging, and discussed /agreed on the plan of care with Dr. Jennetta Phalen. Assessment / Plan:  Obstructive Jaundice s/p ERCP  with Biliary Sphincterotomy, Biliary Stone Removal and Balloon Sphincteroplasty    Biliary Colic   Post ERCP Pancreatitis   Gastritis   · CT abd/pelvis  - Bibasilar subsegmental atelectasis. Gastritis. Fluid surrounding the pancreas and duodenum and biliary system. Please clinically exclude pancreatitis. · CT abd/pelvis  - Imaging findings consistent with worsening changes related to pancreatitis. Increased ascites. Increased inflammatory change adjacent to the pancreas. Persistent gastritis. Increased bibasilar atelectasis and pleural effusions. · Appreciate Surgery input    · Appreciate GI input   · Trend lipase - slightly elevated today   · Continue CLD   · Denies abdominal pain today   · Continue famotidine   · Continue Cefepime and Metronidazole - Day 6   · Continue IVF   · Hopefully home soon     Leukocytosis - resolved   · Urine Culture NEGATIVE   · Blood Culture NG at 5 days   · CXR  - Slightly diminished lung volumes and left basilar haziness may represent effusion or atelectasis. No focal consolidation   · WBC now normal   · Afebrile last 24 hours   · Encouraged ambulation and use of IS    Mild Hyponatremia   · Sodium 135 - monitor   Hypokalemia - resolved   Hypomagnesemia - resolve d    Anemia   Thrombocytosis   · Stable, monitor  · No signs of thrombosis   · Stool guaiac NEGATIVE   · Restart iron supplementation once tolerating solid diet     Protein Calorie Malnutrition  · Advance diet as tolerated   · Nutritional supplementation     30.0 - 39.9 Obese / Body mass index is 33.73 kg/m².     Code status: Full  Prophylaxis: SCD's  Recommended Disposition: Home w/Family     Subjective:     Chief Complaint / Reason for Physician Visit  Patient seen at bedside, stated her abdominal pain as improved. Tolerating CLD, discussed plan of care. Patient verbalized understanding. Discussed with RN events overnight. Review of Systems:  Symptom Y/N Comments  Symptom Y/N Comments   Fever/Chills n   Chest Pain n    Poor Appetite n   Edema n    Cough n   Abdominal Pain y    Sputum n   Joint Pain n    SOB/COREA n   Pruritis/Rash n    Nausea/vomit n   Tolerating PT/OT n    Diarrhea n   Tolerating Diet y CLD   Constipation n   Other       Could NOT obtain due to:      Objective:     VITALS:   Last 24hrs VS reviewed since prior progress note. Most recent are:  Patient Vitals for the past 24 hrs:   Temp Pulse Resp BP SpO2   02/02/21 1536 98.9 °F (37.2 °C) 75 16 115/82 98 %   02/02/21 1145 99 °F (37.2 °C) 81 16 116/81 94 %   02/02/21 0758 98.1 °F (36.7 °C) 76 16 110/74 95 %   02/02/21 0422 98.3 °F (36.8 °C) 76 16 99/60 96 %   02/02/21 0011 98.2 °F (36.8 °C) 81 16 122/74 96 %   02/01/21 2037 98.9 °F (37.2 °C) 86 18 125/81 99 %       Intake/Output Summary (Last 24 hours) at 2/2/2021 1736  Last data filed at 2/2/2021 1908  Gross per 24 hour   Intake 2315 ml   Output --   Net 2315 ml        I had a face to face encounter and independently examined this patient on 2/2/2021, as outlined below:  PHYSICAL EXAM:  General: WD, WN. Alert, cooperative, no acute distress    EENT:  EOMI. Anicteric sclerae. MMM  Resp:  CTA bilaterally, no wheezing or rales. No accessory muscle use  CV:  Regular  rhythm,  No edema  GI:  Soft, obese, tender. +Bowel sounds  Neurologic:  Alert and oriented X 3, normal speech,   Psych:   Good insight. Not anxious nor agitated  Skin:  No rashes.   No jaundice    Reviewed most current lab test results and cultures  YES  Reviewed most current radiology test results   YES  Review and summation of old records today    NO  Reviewed patient's current orders and MAR    YES  PMH/SH reviewed - no change compared to H&P  ________________________________________________________________________  Care Plan discussed with:    Comments   Patient x    Family      RN x    Care Manager x    Consultant                        Multidiciplinary team rounds were held today with , nursing, pharmacist and clinical coordinator. Patient's plan of care was discussed; medications were reviewed and discharge planning was addressed. ________________________________________________________________________  Total NON critical care TIME:  25   Minutes    Total CRITICAL CARE TIME Spent:   Minutes non procedure based      Comments   >50% of visit spent in counseling and coordination of care x    ________________________________________________________________________  Lay Lopez NP     Procedures: see electronic medical records for all procedures/Xrays and details which were not copied into this note but were reviewed prior to creation of Plan. LABS:  I reviewed today's most current labs and imaging studies.   Pertinent labs include:  Recent Labs     02/02/21 0333 02/01/21 0159 01/31/21 0331   WBC 8.9 12.6* 13.5*   HGB 9.6* 9.3* 8.9*   HCT 29.8* 28.7* 27.8*   * 419* 361     Recent Labs     02/02/21 0333 02/01/21 0159 01/31/21 0331   * 135* 134*   K 3.5 3.6 3.8    103 104   CO2 26 26 26   GLU 85 90 120*   BUN 4* 4* 2*   CREA 0.37* 0.35* 0.40*   CA 8.3* 8.3* 8.1*   MG 2.2  --  2.2   PHOS  --   --  2.8   ALB  --  2.2*  --    TBILI  --  0.7  --    ALT  --  21  --        Signed: Lay Lopez, NP

## 2021-02-02 NOTE — PROGRESS NOTES
End of Shift Note    Bedside shift change report given to Eva Jacobo (oncoming nurse) by Manuel Sanabria RN (offgoing nurse). Report included the following information SBAR, Kardex and Recent Results    Shift worked:  7p-7a     Shift summary and any significant changes:     Pt c/o pain/nausea, controlled by PRN medications. Uneventful shift. Concerns for physician to address:  none     Zone phone for oncoming shift:          Activity:  Activity Level: Up ad herb  Number times ambulated in hallways past shift: 0, ambulated in room  Number of times OOB to chair past shift: 0    Cardiac:   Cardiac Monitoring: No      Cardiac Rhythm: Normal sinus rhythm    Access:   Current line(s): PIV     Genitourinary:   Urinary status: voiding    Respiratory:   O2 Device: Room air  Chronic home O2 use?: NO  Incentive spirometer at bedside: YES  Actual Volume (ml): 1000 ml  GI:  Last Bowel Movement Date: 02/01/21  Current diet:  DIET CLEAR LIQUID  DIET NUTRITIONAL SUPPLEMENTS Dinner; Ensure Clear  Passing flatus: YES  Tolerating current diet: NO  % Diet Eaten: 20 %    Pain Management:   Patient states pain is manageable on current regimen: YES    Skin:  Daniel Score: 22  Interventions: increase time out of bed    Patient Safety:  Fall Score:  Total Score: 1  Interventions: gripper socks       Length of Stay:  Expected LOS: 4d 9h  Actual LOS: [facility], RN

## 2021-02-02 NOTE — PROGRESS NOTES
Problem: Falls - Risk of  Goal: *Absence of Falls  Description: Document Susie Valle Fall Risk and appropriate interventions in the flowsheet.   Outcome: Progressing Towards Goal  Note: Fall Risk Interventions:  Mobility Interventions: Patient to call before getting OOB         Medication Interventions: Teach patient to arise slowly                   Problem: Patient Education: Go to Patient Education Activity  Goal: Patient/Family Education  Outcome: Progressing Towards Goal     Problem: Pain  Goal: *Control of Pain  Outcome: Progressing Towards Goal  Goal: *PALLIATIVE CARE:  Alleviation of Pain  Outcome: Progressing Towards Goal

## 2021-02-02 NOTE — INTERDISCIPLINARY ROUNDS
Interdisciplinary Rounds were completed on 02/02/21 for this patient. Rounds included NP, nursing, clinical care leader, pharmacy, and case management. Plan of care discussed. See clinical pathway and/or care plan for interventions and desired outcomes.

## 2021-02-02 NOTE — PROGRESS NOTES
ASHLEY Plan:     *Home w/family   * to transport at d/c    Patient is expected to d/c home tomorrow. CM verified pt's PCP, as Dr Mariella Quinones w/[facility]. Hospital f/u has been scheduled for Thurs. 2/4/21. No needs or concerns have been identified. CM will continue to follow.     [facility]  Ext [phone]

## 2021-02-02 NOTE — PROGRESS NOTES
GI PROGRESS NOTE      NAME:   Donny Jameson   :    1980   MRN:    [id number]     Assessment/Plan   · Acute pancreatitis without necrosis or infection. · Keep her on a CLD for now  · Monitor clinical course. Lipase still mildly up. · Ambulate pt     Patient Active Problem List   Diagnosis Code    Obstructive jaundice K83.1       Subjective:     Donny Jameson is a 36 y.o.  female who Is feeling better. Had somegas pains. Taking pain meds q5-6 hours    Review of Systems    Constitutional: negative fever, negative chills, negative weight loss  Eyes:   negative visual changes  ENT:   negative sore throat, tongue or lip swelling  Respiratory:  negative cough, negative dyspnea  Cards:  negative for chest pain, palpitations, lower extremity edema  GI:   See HPI  :  negative for frequency, dysuria  Integument:  negative for rash and pruritus  Heme:  negative for easy bruising and gum/nose bleeding  Musculoskel: negative for myalgias,  back pain and muscle weakness  Neuro: negative for headaches, dizziness, vertigo  Psych:  negative for feelings of anxiety, depression           Objective:     VITALS:   Last 24hrs VS reviewed since prior hospitalist progress note. Most recent are:  Visit Vitals  BP 99/60 (BP 1 Location: Left upper arm, BP Patient Position: At rest)   Pulse 76   Temp 98.3 °F (36.8 °C)   Resp 16   Ht 5' 6\" (1.676 m)   Wt 94.8 kg (209 lb)   SpO2 96%   BMI 33.73 kg/m²       Intake/Output Summary (Last 24 hours) at 2021 0741  Last data filed at 2021  Gross per 24 hour   Intake 1271.25 ml   Output --   Net 1271.25 ml        PHYSICAL EXAM:  General   well developed, well nourished, appears stated age, in no acute distress  EENT  Normocephalic  Neck   Supple without nodes or mass.  No thyromegaly or bruit  Respiratory   Clear To Auscultation bilaterally -   Cardiology  Regular Rate and Rythmn  -   Abdominal  Soft, mild LUQ pain  Extremities  No clubbing, cyanosis, or edema. Pulses intact. Skin  Normal skin turgor. Neurological  No focal neurological deficits noted  Psychological  Oriented x 3. Normal affect. Lab Data   Recent Results (from the past 12 hour(s))   LIPASE    Collection Time: 02/02/21  3:33 AM   Result Value Ref Range    Lipase 559 (H) 73 - 393 U/L   CBC WITH AUTOMATED DIFF    Collection Time: 02/02/21  3:33 AM   Result Value Ref Range    WBC 8.9 3.6 - 11.0 K/uL    RBC 3.53 (L) 3.80 - 5.20 M/uL    HGB 9.6 (L) 11.5 - 16.0 g/dL    HCT 29.8 (L) 35.0 - 47.0 %    MCV 84.4 80.0 - 99.0 FL    MCH 27.2 26.0 - 34.0 PG    MCHC 32.2 30.0 - 36.5 g/dL    RDW 14.6 (H) 11.5 - 14.5 %    PLATELET 308 (H) 591 - 400 K/uL    MPV 10.5 8.9 - 12.9 FL    NRBC 0.0 0  WBC    ABSOLUTE NRBC 0.00 0.00 - 0.01 K/uL    NEUTROPHILS 82 (H) 32 - 75 %    BAND NEUTROPHILS 1 %    LYMPHOCYTES 10 (L) 12 - 49 %    MONOCYTES 5 5 - 13 %    EOSINOPHILS 1 0 - 7 %    BASOPHILS 0 0 - 1 %    METAMYELOCYTES 1 %    IMMATURE GRANULOCYTES 0 0.0 - 0.5 %    ABS. NEUTROPHILS 7.4 1.8 - 8.0 K/UL    ABS. LYMPHOCYTES 0.9 0.8 - 3.5 K/UL    ABS. MONOCYTES 0.4 0.0 - 1.0 K/UL    ABS. EOSINOPHILS 0.1 0.0 - 0.4 K/UL    ABS. BASOPHILS 0.0 0.0 - 0.1 K/UL    ABS. IMM.  GRANS. 0.0 0.00 - 0.04 K/UL    DF MANUAL      RBC COMMENTS NORMOCYTIC, NORMOCHROMIC     METABOLIC PANEL, BASIC    Collection Time: 02/02/21  3:33 AM   Result Value Ref Range    Sodium 135 (L) 136 - 145 mmol/L    Potassium 3.5 3.5 - 5.1 mmol/L    Chloride 103 97 - 108 mmol/L    CO2 26 21 - 32 mmol/L    Anion gap 6 5 - 15 mmol/L    Glucose 85 65 - 100 mg/dL    BUN 4 (L) 6 - 20 MG/DL    Creatinine 0.37 (L) 0.55 - 1.02 MG/DL    BUN/Creatinine ratio 11 (L) 12 - 20      GFR est AA >60 >60 ml/min/1.73m2    GFR est non-AA >60 >60 ml/min/1.73m2    Calcium 8.3 (L) 8.5 - 10.1 MG/DL   MAGNESIUM    Collection Time: 02/02/21  3:33 AM   Result Value Ref Range    Magnesium 2.2 1.6 - 2.4 mg/dL         Medications: Reviewed    PMH/ reviewed - no change compared to H&P  Attending Physician: Napoleon Wheeler MD   Date/Time:  2/2/2021

## 2021-02-03 VITALS
HEIGHT: 66 IN | WEIGHT: 209 LBS | BODY MASS INDEX: 33.59 KG/M2 | DIASTOLIC BLOOD PRESSURE: 75 MMHG | RESPIRATION RATE: 16 BRPM | TEMPERATURE: 98.1 F | HEART RATE: 73 BPM | SYSTOLIC BLOOD PRESSURE: 119 MMHG | OXYGEN SATURATION: 98 %

## 2021-02-03 LAB
ANION GAP SERPL CALC-SCNC: 7 MMOL/L (ref 5–15)
ANION GAP SERPL CALC-SCNC: 7 MMOL/L (ref 5–15)
BASOPHILS # BLD: 0 K/UL (ref 0–0.1)
BASOPHILS NFR BLD: 0 % (ref 0–1)
BUN SERPL-MCNC: 3 MG/DL (ref 6–20)
BUN SERPL-MCNC: 4 MG/DL (ref 6–20)
BUN/CREAT SERPL: 11 (ref 12–20)
BUN/CREAT SERPL: 14 (ref 12–20)
CALCIUM SERPL-MCNC: 6.9 MG/DL (ref 8.5–10.1)
CALCIUM SERPL-MCNC: 8.5 MG/DL (ref 8.5–10.1)
CHLORIDE SERPL-SCNC: 102 MMOL/L (ref 97–108)
CHLORIDE SERPL-SCNC: 110 MMOL/L (ref 97–108)
CO2 SERPL-SCNC: 23 MMOL/L (ref 21–32)
CO2 SERPL-SCNC: 26 MMOL/L (ref 21–32)
CREAT SERPL-MCNC: 0.22 MG/DL (ref 0.55–1.02)
CREAT SERPL-MCNC: 0.37 MG/DL (ref 0.55–1.02)
DIFFERENTIAL METHOD BLD: ABNORMAL
EOSINOPHIL # BLD: 0 K/UL (ref 0–0.4)
EOSINOPHIL NFR BLD: 0 % (ref 0–7)
ERYTHROCYTE [DISTWIDTH] IN BLOOD BY AUTOMATED COUNT: 14.4 % (ref 11.5–14.5)
GLUCOSE SERPL-MCNC: 62 MG/DL (ref 65–100)
GLUCOSE SERPL-MCNC: 93 MG/DL (ref 65–100)
HCT VFR BLD AUTO: 25.8 % (ref 35–47)
HGB BLD-MCNC: 8.3 G/DL (ref 11.5–16)
IMM GRANULOCYTES # BLD AUTO: 0 K/UL (ref 0–0.04)
IMM GRANULOCYTES NFR BLD AUTO: 0 % (ref 0–0.5)
LIPASE SERPL-CCNC: 380 U/L (ref 73–393)
LYMPHOCYTES # BLD: 0.6 K/UL (ref 0.8–3.5)
LYMPHOCYTES NFR BLD: 7 % (ref 12–49)
MCH RBC QN AUTO: 27.2 PG (ref 26–34)
MCHC RBC AUTO-ENTMCNC: 32.2 G/DL (ref 30–36.5)
MCV RBC AUTO: 84.6 FL (ref 80–99)
METAMYELOCYTES NFR BLD MANUAL: 1 %
MONOCYTES # BLD: 0.6 K/UL (ref 0–1)
MONOCYTES NFR BLD: 8 % (ref 5–13)
NEUTS BAND NFR BLD MANUAL: 2 %
NEUTS SEG # BLD: 6.6 K/UL (ref 1.8–8)
NEUTS SEG NFR BLD: 82 % (ref 32–75)
NRBC # BLD: 0 K/UL (ref 0–0.01)
NRBC BLD-RTO: 0 PER 100 WBC
PLATELET # BLD AUTO: 428 K/UL (ref 150–400)
PMV BLD AUTO: 10.6 FL (ref 8.9–12.9)
POTASSIUM SERPL-SCNC: 2.9 MMOL/L (ref 3.5–5.1)
POTASSIUM SERPL-SCNC: 4.2 MMOL/L (ref 3.5–5.1)
RBC # BLD AUTO: 3.05 M/UL (ref 3.8–5.2)
RBC MORPH BLD: ABNORMAL
SODIUM SERPL-SCNC: 135 MMOL/L (ref 136–145)
SODIUM SERPL-SCNC: 140 MMOL/L (ref 136–145)
WBC # BLD AUTO: 7.9 K/UL (ref 3.6–11)

## 2021-02-03 PROCEDURE — 74011250637 HC RX REV CODE- 250/637: Performed by: NURSE PRACTITIONER

## 2021-02-03 PROCEDURE — 74011250636 HC RX REV CODE- 250/636: Performed by: INTERNAL MEDICINE

## 2021-02-03 PROCEDURE — 94760 N-INVAS EAR/PLS OXIMETRY 1: CPT

## 2021-02-03 PROCEDURE — 85025 COMPLETE CBC W/AUTO DIFF WBC: CPT

## 2021-02-03 PROCEDURE — 36415 COLL VENOUS BLD VENIPUNCTURE: CPT

## 2021-02-03 PROCEDURE — 74011000250 HC RX REV CODE- 250: Performed by: INTERNAL MEDICINE

## 2021-02-03 PROCEDURE — 83690 ASSAY OF LIPASE: CPT

## 2021-02-03 PROCEDURE — 74011000258 HC RX REV CODE- 258: Performed by: NURSE PRACTITIONER

## 2021-02-03 PROCEDURE — 74011250636 HC RX REV CODE- 250/636: Performed by: NURSE PRACTITIONER

## 2021-02-03 PROCEDURE — 80048 BASIC METABOLIC PNL TOTAL CA: CPT

## 2021-02-03 RX ORDER — POTASSIUM CHLORIDE 7.45 MG/ML
10 INJECTION INTRAVENOUS
Status: COMPLETED | OUTPATIENT
Start: 2021-02-03 | End: 2021-02-03

## 2021-02-03 RX ORDER — POTASSIUM CHLORIDE 20 MEQ/1
40 TABLET, EXTENDED RELEASE ORAL 2 TIMES DAILY
Status: DISCONTINUED | OUTPATIENT
Start: 2021-02-03 | End: 2021-02-03 | Stop reason: HOSPADM

## 2021-02-03 RX ADMIN — CEFEPIME 2 G: 2 INJECTION, POWDER, FOR SOLUTION INTRAVENOUS at 08:54

## 2021-02-03 RX ADMIN — Medication 10 ML: at 00:18

## 2021-02-03 RX ADMIN — SODIUM CHLORIDE 75 ML/HR: 9 INJECTION, SOLUTION INTRAVENOUS at 00:21

## 2021-02-03 RX ADMIN — POTASSIUM CHLORIDE 10 MEQ: 10 INJECTION, SOLUTION INTRAVENOUS at 07:33

## 2021-02-03 RX ADMIN — POTASSIUM CHLORIDE 40 MEQ: 20 TABLET, EXTENDED RELEASE ORAL at 08:53

## 2021-02-03 RX ADMIN — POTASSIUM CHLORIDE 10 MEQ: 10 INJECTION, SOLUTION INTRAVENOUS at 10:02

## 2021-02-03 RX ADMIN — CEFEPIME 2 G: 2 INJECTION, POWDER, FOR SOLUTION INTRAVENOUS at 00:17

## 2021-02-03 RX ADMIN — Medication 10 ML: at 04:24

## 2021-02-03 RX ADMIN — POTASSIUM CHLORIDE 40 MEQ: 20 TABLET, EXTENDED RELEASE ORAL at 09:07

## 2021-02-03 RX ADMIN — HYDROMORPHONE HYDROCHLORIDE 1 MG: 1 INJECTION, SOLUTION INTRAMUSCULAR; INTRAVENOUS; SUBCUTANEOUS at 04:24

## 2021-02-03 RX ADMIN — FAMOTIDINE 20 MG: 10 INJECTION INTRAVENOUS at 08:53

## 2021-02-03 RX ADMIN — HYDROMORPHONE HYDROCHLORIDE 1 MG: 1 INJECTION, SOLUTION INTRAMUSCULAR; INTRAVENOUS; SUBCUTANEOUS at 09:07

## 2021-02-03 RX ADMIN — METRONIDAZOLE 500 MG: 500 INJECTION, SOLUTION INTRAVENOUS at 06:45

## 2021-02-03 NOTE — PROGRESS NOTES
End of Shift Note    Bedside shift change report given to Eva Jacobo (oncoming nurse) by Manuel Sanabria RN (offgoing nurse). Report included the following information SBAR, Kardex and Recent Results    Shift worked:  7p-7a     Shift summary and any significant changes:     Pt still c/o abdominal pain/Nausea only relieved by IV dilaudid and zofran. Pt was able to rest during the night. Concerns for physician to address:  none     Zone phone for oncoming shift:   [phone]       Activity:  Activity Level: Up ad herb  Number times ambulated in hallways past shift: 0, ambulated in room and bathroom  Number of times OOB to chair past shift: 2    Cardiac:   Cardiac Monitoring: No      Cardiac Rhythm: Normal sinus rhythm    Access:   Current line(s): PIV     Genitourinary:   Urinary status: voiding    Respiratory:   O2 Device: Room air  Chronic home O2 use?: NO  Incentive spirometer at bedside: YES  Actual Volume (ml): 1200 ml  GI:  Last Bowel Movement Date: 02/02/21  Current diet:  DIET CLEAR LIQUID  DIET NUTRITIONAL SUPPLEMENTS Dinner; Ensure Clear  DIET NUTRITIONAL SUPPLEMENTS All Meals; Ensure Clear  Passing flatus: YES  Tolerating current diet: NO  % Diet Eaten: 20 %    Pain Management:   Patient states pain is manageable on current regimen: YES    Skin:  Daniel Score: 22  Interventions: increase time out of bed    Patient Safety:  Fall Score:  Total Score: 1  Interventions: gripper socks       Length of Stay:  Expected LOS: 4d 9h  Actual LOS: [address], RN

## 2021-02-03 NOTE — DISCHARGE INSTRUCTIONS
Patient Education        Dolor abdominal: Instrucciones de cuidado  Abdominal Pain: Care Instructions  Instrucciones de cuidado    El dolor abdominal tiene muchas causas posibles. Algunas de ellas no son graves y mejoran por sí solas en unos días. Otras requieren [place] y [place]. Si nichols dolor continúa o [place], necesitará porfirio nueva revisión y [place] pruebas para determinar qué pasa. Es posible que necesite cirugía para corregir el problema. No ignore nuevos síntomas, deborah fiebre, náuseas y [place], [address] [facility], dolor que [place] o [place]. Podrían ser señales de un problema más grave. Nichols médico puede haberle recomendado porfirio consulta de [employer] las 8 o 12 horas siguientes. Si no se siente mejor, es posible que requiera [place] o [place]. El médico lo nicole revisado minuciosamente, edie puede gallito problemas más tarde. Si nota algún problema o síntomas nuevos, busque tratamiento médico inmediatamente. La atención de seguimiento es porfirio parte clave de nichols tratamiento y seguridad. Asegúrese de hacer y acudir a todas las citas, y llame a nichols médico si está teniendo problemas. También es porfirio buena idea saber los resultados de shade exámenes y mantener porfirio lista de los medicamentos que joe. ¿Cómo puede cuidarse en el hogar? · Descanse hasta que se sienta mejor. · Para prevenir la deshidratación, april abundantes líquidos, suficientes para que nichols orina sea de color amarillo ajay o transparente deborah el agua. Elija beber agua y otros líquidos darnell sin cafeína hasta que se sienta mejor. Si tiene [facility] [employer], del corazón o del hígado y tiene que [place]'s líquidos, hable con nichols médico antes de aumentar nichols consumo. · Si tiene [employer], coma alimentos suaves, deborah arroz, pan elvira seco o galletas saladas, bananas (plátanos) y puré de Synchari. Trate de comer varias comidas pequeñas al día en lugar de dos o anjali grandes.   · Espere hasta 48 horas después de que todos los síntomas hayan desaparecido antes de comer alimentos condimentados, alcohol y bebidas que contengan cafeína. · No consuma alimentos ricos en grasa. · Evite medicamentos antiinflamatorios deborah aspirina, ibuprofeno (Advil, Motrin) y naproxeno (Aleve). Pueden causar [employer]. Dígale a nichols médico si está tomando aspirina diariamente debido a otro problema de jose carlos. ¿Cuándo debe pedir ayuda? Llame al 911 en cualquier momento que considere que necesita atención de emergencia. Por ejemplo, llame si:    · Se desmayó (perdió el conocimiento).   · Las heces son de color rojizo o muy sanguinolentas (con best).   · Vomita best o algo parecido a granos de café molido.     · Tiene dolor abdominal nuevo e intenso. Llame a nichols médico ahora mismo o busque atención médica inmediata si:    · Nichols dolor empeora, sobre todo si se concentra en porfirio kamille parte del vientre.     · Vuelve a tener fiebre o tiene fiebre más petra.     · Bella heces son negruzcas y parecidas al alquitrán o tienen rastros de best.     · Tiene sangrado vaginal inesperado.     · Tiene síntomas de porfirio infección del tracto urinario. Estos podrían incluir:  ? Dolor al Terri Kells. ? Orinar con más frecuencia que lo habitual.  ? Best en la [facility].     · Siente mareos o aturdimiento, o que está a punto de desmayarse. Preste especial atención a los cambios en nichols jose carlos y asegúrese de comunicarse con nichols médico si:    · No está mejorando después de 1 día (24 horas). ¿Dónde puede encontrar más información en inglés? [place] a http://www.lisset.com/  García Bullard P756 en la búsqueda para aprender más acerca de \"Dolor abdominal: Instrucciones de cuidado. \"  Revisado: 26 junio, 5006               BBCBWEG del contenido: 12.6  © 3146-8785 [employer]. Las instrucciones de cuidado fueron adaptadas bajo licencia por [employer] (which disclaims liability or warranty for this information).  Si usted tiene preguntas sobre porfirio afección médica o sobre estas instrucciones, siempre pregunte a nichols profesional de jose carlos. [facility] [employer]orporated niega toda garantía o responsabilidad por nichols uso de esta información. Patient Education        Colocación de porfirio endoprótesis biliar: Agustin Jamarel en el hogar  Biliary Stent Placement: What to Expect at Home  Nichols recuperación  Colangiopancreatografía retrógrada endoscópica (ERCP, por shade siglas en inglés)  Si le colocaron porfirio endoprótesis biliar por medio de porfirio ERCP, es probable que permanezca en el hospital o la clínica asad 1 o 2 horas. [place] permitirá que pase el efecto de la anestesia. Usted podrá regresar a nichols hogar después de que nichols médico o porfirio enfermera lo examine para asegurarse de que no tiene ningún problema. Si pasa la noche en el hospital, es posible que pueda regresar a nichols hogar al día siguiente. Podría tener dolor de garganta asad un [address] procedimiento. Colangiografía transhepática percutánea (PTC, por shade siglas en inglés)  Si la colocación se hizo por medio de porfirio PTC, nichols médico puede querer que usted se recueste sobre el lado derecho asad al menos 6 horas antes de regresar a nichols hogar. [place] se hace para reducir el riesgo de sangrado del sitio de inyección. Si pasa la noche en el hospital, es posible que pueda regresar a nichols hogar al día siguiente. Podría sentir dolor en el lugar donde se insertó la aguja a través de la piel (el sitio de punción). También es posible que sienta dolor en el hombro. [place] se llama dolor referido. Lo provoca el dolor que se transmite por un nervio que conduce al hígado. El dolor referido generalmente dura menos de 12 horas. Puede tener un poco de sangrado del sitio de punción. Después de la PTC, deberá tomárselo con calma y evitar hacer esfuerzos en casa asad 1 o 2 días. Es probable que pueda volver a trabajar y retomar la mayoría de shade actividades habituales después de inder período.   Esta hoja de cuidados [employer] idea general de cuánto tiempo tardará en recuperarse. Sin embargo, cada persona se recupera a un ritmo diferente. Siga los pasos siguientes para sentirse mejor buchanan pronto deborah sea posible. ¿Cómo puede cuidarse en el hogar? Actividad    · Descanse lo que necesite después de regresar a nichols casa.     · Debería poder retomar shade actividades habituales al cabo de tariq o [address], dependiendo de la dificultad física de [place]. Alimentación    · Siga las indicaciones de nichols médico para comer después del procedimiento.     · Smitha mucho líquido (a menos que nichols médico le diga lo contrario). Medicamentos    · Nichols médico le dirá si puede comenzar a yosef shade medicamentos de nuevo y cuándo puede hacerlo. Él o lin también le dará instrucciones acerca de yosef cualquier medicamento nuevo.     · Si joe aspirina o algún otro medicamento que previene la formación de coágulos de José, asegúrese de hablar con nichols médico. Él o lin le dirá si puede comenzar a yosef inder medicamento de nuevo y cuándo puede hacerlo. Asegúrese de entender exactamente lo que nichols médico quiere que abiel. La atención de seguimiento es porfirio parte clave de nichols tratamiento y seguridad. Asegúrese de hacer y acudir a todas las citas, y llame a nichols médico si está teniendo problemas. También es porfirio buena idea saber los resultados de los exámenes y mantener porfirio lista de los medicamentos que joe. ¿Cuándo debe pedir ayuda? Llame al 911 en cualquier momento que considere que necesita atención de [place]. Por ejemplo, llame si:    · Se desmayó (perdió el conocimiento).   · Vomita best o algo parecido a granos de café molido. Llame a nichols médico ahora mismo o busque atención médica inmediata si:    · Tiene un dolor abdominal nuevo o más intenso.     · Vomita o tiene náuseas.     · Tiene señales de infección, tales deborah:  ? Aumento del dolor, la hinchazón, la temperatura o el enrojecimiento. ?  Vetas rojizas que salen del sitio de punción. ? Pus que sale del sitio de punción. ? Spencer Sergeant.     · Sangra a través del vendaje.     · Tiene heces sanguinolentas (con best) o negruzcas.     · No mejora deborah se esperaba. Preste especial atención a los cambios en nichols josec arlos y asegúrese de comunicarse con nichols médico si tiene algún problema. ¿Dónde puede encontrar más información en inglés? Vaya a http://www.gray.com/  Herrera W804 en la búsqueda para aprender más acerca de \"Colocación de porfirio endoprótesis biliar: Qué esperar en el hogar. \"  Revisado: 15 2020               Versión del contenido: 12.6  © 3178-3930 Healthwise, Incorporated. Las instrucciones de cuidado fueron adaptadas bajo licencia por [employer] (which disclaims liability or warranty for this information). Si usted tiene [place] afección médica o sobre estas instrucciones, siempre pregunte a nichols profesional de jose carlos. Healthwise, Incorporated niega toda garantía o responsabilidad por nichols uso de esta información. HOSPITALIST DISCHARGE INSTRUCTIONS    NAME: Bry Valdez   :  1980   MRN:  [id number]     Date/Time:  2/3/2021 1:14 PM    ADMIT DATE: 2021   DISCHARGE DATE: 2/3/2021     Attending Physician: Elmer Martínez NP    DISCHARGE DIAGNOSIS:  Obstructive Jaundice s/p ERCP  with Biliary Sphincterotomy, Biliary Stone Removal and Balloon Sphincteroplasty    Biliary Colic   Post ERCP Pancreatitis   Gastritis   Leukocytosis   Mild Hyponatremia   Hypokalemia   Hypomagnesemia   Anemia   Thrombocytosis   Protein Calorie Malnutrition     Medications: Per above medication reconciliation.     Pain Management: per above medications    Recommended diet: Regular Diet    Recommended activity: Activity as tolerated    Wound care: None    Indwelling devices:  None    Supplemental Oxygen: None    Required Lab work: per PCP     Glucose management:  None    Code status: Full    Outside physician follow up:    Follow-up Information     Follow up With Specialties Details Why Contact Info    Rebbeca Dubin, MD Internal Medicine, Infectious Disease On 2/4/2021 [facility]   Please bring hospital discharge paperwork to appointment. Please note, this appointment is [address] [facility]  [address] [address] [facility]  [phone]      Teo [facility] Schedule an appointment as soon as possible for a visit [address] [place] follow-up [address] [facility]  [phone]            [facility] to avoid frequent ED visits. [facility] can treat; pains, sprains, cuts, wounds, high fevers, upper respiratory infections and much more. There medical team is equipped with all the tools necessary to provide advanced medical care in the comfort of you home, workplace, or location of need. The medical team consists of doctors, nurse practitioners, and EMTs. [facility] is available 7 days per week 9 am to 9 pm.   Request care by calling [phone] or by going online at [phone] [address] unar      Information obtained by :  I understand that if any problems occur once I am at home I am to contact my physician. I understand and acknowledge receipt of the instructions indicated above.                                                                                                                                            Physician's or R.N.'s Signature                                                                  Date/Time                                                                                                                                              Patient or Repres

## 2021-02-03 NOTE — DISCHARGE SUMMARY
Hospitalist Discharge Summary     Patient ID:  Gypsy Osorio  [id number]  36 y.o.  1980 1/22/2021    PCP on record: Mich Mccormack MD    Admit date: 1/22/2021  Discharge date and time: 2/3/2021    DISCHARGE DIAGNOSIS:    Obstructive Jaundice s/p ERCP 1/24 with Biliary Sphincterotomy, Biliary Stone Removal and Balloon Sphincteroplasty    Biliary Colic   Post ERCP Pancreatitis   Gastritis   Leukocytosis   Mild Hyponatremia   Hypokalemia   Hypomagnesemia   Anemia   Thrombocytosis   Protein Calorie Malnutrition        CONSULTATIONS:  IP CONSULT TO HOSPITALIST  IP CONSULT TO GASTROENTEROLOGY  IP CONSULT TO GENERAL SURGERY    Excerpted HPI from H&P of Pedro Travis MD:  Gypsy Osorio is a 36 y.o.  female who presents with abdominal pain. She had been suffering from what appears to be. Colics for the past year she describes those crisis pain episodes as cramping upper abdominal slightly to the right and with radiation to the right shoulder the pain is associated with nausea and vomiting about 2-3 times per day nonbilious nonbloody. With the pain resolving  Course of the following 2 to 3 days the episodes had become more frequent about every 20 days and more severe and for the last to 3 episodes she has been suffering from nocturnal pruritus that lasts for about 10 to 12 days after each episode. She had been admitted once for similar episode. And had been unable to get health insurance coverage to allow her to have a definite management for it. Today the pain was so severe that she was about to faint so she came to the hospital.     Work-up in the ED was significant for an ALT of 157, 1 , alkaline phosphatase 878 normal lipase  Ultrasound of the abdomen was done and was showing dilation of the common bile duct at 9 mm.     We were asked to admit for work up and evaluation of the above problems. ______________________________________________________________________  DISCHARGE SUMMARY/HOSPITAL COURSE:  for full details see H&P, daily progress notes, labs, consult notes. Obstructive Jaundice s/p ERCP 1/24 with Biliary Sphincterotomy, Biliary Stone Removal and Balloon Sphincteroplasty    Biliary Colic   Post ERCP Pancreatitis   Gastritis   · CT abd/pelvis 1/24 - Bibasilar subsegmental atelectasis. Gastritis. Fluid surrounding the pancreas and duodenum and biliary system. Please clinically exclude pancreatitis. · CT abd/pelvis 1/28 - Imaging findings consistent with worsening changes related to pancreatitis. Increased ascites. Increased inflammatory change adjacent to the pancreas. Persistent gastritis. Increased bibasilar atelectasis and pleural effusions. · Appreciate Surgery input    · Appreciate GI input   · Trend lipase - now WNL    · Tolerating GI lite diet for breakfast and lunch   · Continue famotidine   · Completed 7 days of Cefepime and Metronidazole   Leukocytosis - resolved   · Urine Culture NEGATIVE   · Blood Culture NG at 6 days   · CXR 1/27 - Slightly diminished lung volumes and left basilar haziness may represent effusion or atelectasis. No focal consolidation. · WBC normal   · Afebrile last 48 hours   Mild Hyponatremia - resolved   · Sodium 140  Hypokalemia - resolved   Hypomagnesemia - resolved  Anemia   Thrombocytosis   · Stable, monitor  · No signs of thrombosis   · Stool guaiac NEGATIVE   Protein Calorie Malnutrition  · Advance diet as tolerated   · Nutritional supplementation      Patient seen at bedside, denies any pain. Discussed hospitalization course and discharge instructions. Patient verbalized understanding.  _______________________________________________________________________  Patient seen and examined by me on discharge day. Pertinent Findings:  Gen:    Not in distress  Chest: Clear lungs  CVS:   Regular rhythm.   No edema  Abd:  Soft, obese, not tender  Neuro: Alert and oriented x 3    _______________________________________________________________________  DISCHARGE MEDICATIONS:   Current Discharge Medication List      CONTINUE these medications which have NOT CHANGED    Details   Omeprazole delayed release (PRILOSEC D/R) 20 mg tablet Take 20 mg by mouth daily. Lactobacillus acidophilus (Probiotic) 10 billion cell cap Take 1 Tab by mouth daily. acetaminophen (TYLENOL) 325 mg tablet Take 650 mg by mouth every four (4) hours as needed for Pain. ibuprofen (MOTRIN) 800 mg tablet Take 1 Tab by mouth every six (6) hours as needed for Pain. Qty: 20 Tab, Refills: 0               Patient Follow Up Instructions: Activity: Activity as tolerated  Diet: Regular Diet  Wound Care: None needed    Follow-up Information     Follow up With Specialties Details Why Contact Info    Aixa Aceves MD Internal Medicine, Infectious Disease On 2/4/2021 [facility]   Please bring hospital discharge paperwork to appointment.   Please note, this appointment is [address] [facility]  [address] [address] [facility]  [phone]      [facility] Schedule an appointment as soon as possible for a visit [address] [place] follow-up [facility] [address] [facility]  [facility]  [phone]          ________________________________________________________________    Risk of deterioration: Low    Condition at Discharge:  Stable  __________________________________________________________________    Disposition  Home with family, no needs    ____________________________________________________________________    Code Status: Full Code  ___________________________________________________________________      Total time in minutes spent coordinating this discharge (includes going over instructions, follow-up, prescriptions, and preparing report for sign off to her PCP) :  >30 minutes    Signed:  Mike Lancaster Parul Days, NP

## 2021-02-03 NOTE — PROGRESS NOTES
Problem: Falls - Risk of  Goal: *Absence of Falls  Description: Document Roger Liudmila Fall Risk and appropriate interventions in the flowsheet.   Outcome: Progressing Towards Goal  Note: Fall Risk Interventions:  Mobility Interventions: Patient to call before getting OOB         Medication Interventions: Teach patient to arise slowly                   Problem: Patient Education: Go to Patient Education Activity  Goal: Patient/Family Education  Outcome: Progressing Towards Goal     Problem: Pain  Goal: *Control of Pain  Outcome: Progressing Towards Goal  Goal: *PALLIATIVE CARE:  Alleviation of Pain  Outcome: Progressing Towards Goal

## 2021-02-03 NOTE — PROGRESS NOTES
Problem: Falls - Risk of  Goal: *Absence of Falls  Description: Document Fiorella Salguero Fall Risk and appropriate interventions in the flowsheet.   2/3/2021 1323 by Analisa Rosado  Outcome: Resolved/Met  Note: Fall Risk Interventions:  Mobility Interventions: Patient to call before getting OOB         Medication Interventions: Teach patient to arise slowly                2/3/2021 1016 by Analisa Rosado  Outcome: Progressing Towards Goal  Note: Fall Risk Interventions:  Mobility Interventions: Patient to call before getting OOB         Medication Interventions: Teach patient to arise slowly                   Problem: Patient Education: Go to Patient Education Activity  Goal: Patient/Family Education  2/3/2021 1323 by Analisa Rosado  Outcome: Resolved/Met  2/3/2021 1016 by Analisa Rosado  Outcome: Progressing Towards Goal     Problem: Pain  Goal: *Control of Pain  2/3/2021 1323 by Analisa Rosado  Outcome: Resolved/Met  2/3/2021 1016 by Analisa Rosado  Outcome: Progressing Towards Goal  Goal: *PALLIATIVE CARE:  Alleviation of Pain  2/3/2021 1323 by Analisa Rosado  Outcome: Resolved/Met  2/3/2021 1016 by Analisa Rosado  Outcome: Progressing Towards Goal

## 2021-02-03 NOTE — PROGRESS NOTES
ASHLEY Plan:                 *Home w/family              * to transport at d/c    Patient is discharging home today without any needs. Follow-up appointment is on the AVS.  Patient is ready for d/c from CM standpoint. Care Management Interventions  PCP Verified by CM: Yes  Mode of Transport at Discharge:  Other (see comment)( to provide transportation )  Transition of Care Consult (CM Consult): Discharge Planning  Discharge Durable Medical Equipment: No  Physical Therapy Consult: No  Occupational Therapy Consult: No  Speech Therapy Consult: No  Current Support Network: Lives with Spouse  Confirm Follow Up Transport: Family  Discharge Location  Discharge Placement: Home with family assistance      Catrachito Jamil  Ext [phone]

## 2021-02-03 NOTE — PROGRESS NOTES
Gastroenterology Daily Progress Note Luisana Wilson PA-C for Dr. Jami Buchanan)   [facility]    Admit Date: 1/22/2021       Subjective:       Pain is better. Went all night without pain mediation. Only minimal nausea. Lipase is normal    Current Facility-Administered Medications   Medication Dose Route Frequency    potassium chloride (K-DUR, KLOR-CON) SR tablet 40 mEq  40 mEq Oral BID    potassium chloride 10 mEq in 100 ml IVPB  10 mEq IntraVENous Q1H    0.9% sodium chloride infusion  75 mL/hr IntraVENous CONTINUOUS    metroNIDAZOLE (FLAGYL) IVPB premix 500 mg  500 mg IntraVENous Q12H    cefepime (MAXIPIME) 2 g in 0.9% sodium chloride (MBP/ADV) 100 mL MBP  2 g IntraVENous Q8H    HYDROmorphone (PF) (DILAUDID) injection 1 mg  1 mg IntraVENous Q2H PRN    sodium chloride (NS) flush 5-40 mL  5-40 mL IntraVENous Q8H    fentaNYL citrate (PF) injection 12.5 mcg  12.5 mcg IntraVENous Q6H PRN    famotidine (PF) (PEPCID) 20 mg in 0.9% sodium chloride 10 mL injection  20 mg IntraVENous Q12H    hydrOXYzine HCL (ATARAX) tablet 25 mg  25 mg Oral TID PRN    sodium chloride (NS) flush 5-40 mL  5-40 mL IntraVENous PRN    acetaminophen (TYLENOL) tablet 650 mg  650 mg Oral Q6H PRN    Or    acetaminophen (TYLENOL) suppository 650 mg  650 mg Rectal Q6H PRN    polyethylene glycol (MIRALAX) packet 17 g  17 g Oral DAILY PRN    promethazine (PHENERGAN) tablet 12.5 mg  12.5 mg Oral Q6H PRN    Or    ondansetron (ZOFRAN) injection 4 mg  4 mg IntraVENous Q6H PRN        Objective:     Visit Vitals  /69   Pulse 72   Temp 98.2 °F (36.8 °C)   Resp 16   Ht 5' 6\" (1.676 m)   Wt 94.8 kg (209 lb)   SpO2 99%   BMI 33.73 kg/m²   Blood pressure 115/69, pulse 72, temperature 98.2 °F (36.8 °C), resp. rate 16, height 5' 6\" (1.676 m), weight 94.8 kg (209 lb), last menstrual period 01/21/2021, SpO2 99 %. No intake/output data recorded.     02/01 1901 - 02/03 0700  In: 2965 [I.V.:2965]  Out: - Intake/Output Summary (Last 24 hours) at 2/3/2021 0939  Last data filed at 2/3/2021 0210  Gross per 24 hour   Intake 1772.5 ml   Output --   Net 1772.5 ml         Physical Exam:       General: no acute distress  GI: Soft,  ND + bowel sounds, +tenderness, no guarding or rebounding      Labs:       Recent Results (from the past 24 hour(s))   LIPASE    Collection Time: 02/03/21  5:13 AM   Result Value Ref Range    Lipase 380 73 - 393 U/L   CBC WITH AUTOMATED DIFF    Collection Time: 02/03/21  5:13 AM   Result Value Ref Range    WBC 7.9 3.6 - 11.0 K/uL    RBC 3.05 (L) 3.80 - 5.20 M/uL    HGB 8.3 (L) 11.5 - 16.0 g/dL    HCT 25.8 (L) 35.0 - 47.0 %    MCV 84.6 80.0 - 99.0 FL    MCH 27.2 26.0 - 34.0 PG    MCHC 32.2 30.0 - 36.5 g/dL    RDW 14.4 11.5 - 14.5 %    PLATELET 482 (H) 024 - 400 K/uL    MPV 10.6 8.9 - 12.9 FL    NRBC 0.0 0  WBC    ABSOLUTE NRBC 0.00 0.00 - 0.01 K/uL    NEUTROPHILS 82 (H) 32 - 75 %    BAND NEUTROPHILS 2 %    LYMPHOCYTES 7 (L) 12 - 49 %    MONOCYTES 8 5 - 13 %    EOSINOPHILS 0 0 - 7 %    BASOPHILS 0 0 - 1 %    METAMYELOCYTES 1 %    IMMATURE GRANULOCYTES 0 0.0 - 0.5 %    ABS. NEUTROPHILS 6.6 1.8 - 8.0 K/UL    ABS. LYMPHOCYTES 0.6 (L) 0.8 - 3.5 K/UL    ABS. MONOCYTES 0.6 0.0 - 1.0 K/UL    ABS. EOSINOPHILS 0.0 0.0 - 0.4 K/UL    ABS. BASOPHILS 0.0 0.0 - 0.1 K/UL    ABS. IMM.  GRANS. 0.0 0.00 - 0.04 K/UL    DF MANUAL      RBC COMMENTS NORMOCYTIC, NORMOCHROMIC     METABOLIC PANEL, BASIC    Collection Time: 02/03/21  5:13 AM   Result Value Ref Range    Sodium 140 136 - 145 mmol/L    Potassium 2.9 (L) 3.5 - 5.1 mmol/L    Chloride 110 (H) 97 - 108 mmol/L    CO2 23 21 - 32 mmol/L    Anion gap 7 5 - 15 mmol/L    Glucose 62 (L) 65 - 100 mg/dL    BUN 3 (L) 6 - 20 MG/DL    Creatinine 0.22 (L) 0.55 - 1.02 MG/DL    BUN/Creatinine ratio 14 12 - 20      GFR est AA >60 >60 ml/min/1.73m2    GFR est non-AA >60 >60 ml/min/1.73m2    Calcium 6.9 (L) 8.5 - 10.1 MG/DL   LABRCNT(wbc:2,hgb:2,hct:2,plt:2,)  Recent Labs     02/03/21 0513 02/02/21 0333 02/01/21  0159    135* 135*   K 2.9* 3.5 3.6   * 103 103   CO2 23 26 26   BUN 3* 4* 4*   CREA 0.22* 0.37* 0.35*   GLU 62* 85 90   CA 6.9* 8.3* 8.3*   MG  --  2.2  --    LABRCNT(sgot:3,gpt:3,ap:3,tbiL:3,TP:3,ALB:3,GLOB:3,ggt:3,aml:3,amyp:3,lpse:3,hlpse:3)No results for input(s): INR, PTP, APTT, INREXT in the last 72 hours. Recent Labs     02/03/21 0513 02/02/21 0333 02/01/21 0159   AP  --   --  262*   TP  --   --  6.0*   ALB  --   --  2.2*   GLOB  --   --  3.8   LPSE 380 559* 546*   BRIEFLAB(B12,FOL,FOLAT,RBCF)  Lab Results   Component Value Date/Time    Folate 6.1 01/30/2021 03:59 AM   LABRCNT(CPK:3,CpKMB:3,ckndx:3,troiq:3)No components found for: GLPOCBRIEFLAB(CHOL,CHOLX,CHOLP,CHLST,CHOLV,HDL,HDLC,HDLP,LDL,DLDL,LDLC,DLDLP,TGL,TGLX,TRIGL,TRIGP,CHHD,CHHDX)No results for input(s): PH, PCO2, PO2 in the last 72 hours. LABRCNT(CPK:3,CpKMB:3,ckndx:3,troiq:3)HEIKE Real  No results for input(s): CPK, CKNDX, TROIQ in the last 72 hours. No lab exists for component: CPKMBMEShannon HEIKE Nix      Problem List:     Active Problems:    Obstructive jaundice (1/22/2021)        Impression:  Acute post ERCP pancreatitis without necrosis or infection           Plan:  Hospitalist advanced diet to GI lite today.    If she tolerates GI lite, she may be discharged later today  Patient is okay with this plan and we discussed diet once she goes home  Will arrange for follow up in the office and will need repeat CT in about 6 weeks  K replacement per hospitalist        HEIKE Franco  9:39 AM   2/3/2021  [address]  [address] [facility], [address] [place] [address]: [phone]

## 2021-02-23 ENCOUNTER — HOSPITAL ENCOUNTER (OUTPATIENT)
Dept: LAB | Age: 41
Discharge: HOME | End: 2021-02-23

## 2021-02-23 PROCEDURE — 83690 ASSAY OF LIPASE: CPT

## 2021-02-23 PROCEDURE — 80053 COMPREHEN METABOLIC PANEL: CPT

## 2021-02-24 LAB
ALBUMIN SERPL-MCNC: 3.7 G/DL (ref 3.5–5)
ALBUMIN/GLOB SERPL: 1.1 {RATIO} (ref 1.1–2.2)
ALP SERPL-CCNC: 120 U/L (ref 45–117)
ALT SERPL-CCNC: 24 U/L (ref 12–78)
ANION GAP SERPL CALC-SCNC: 6 MMOL/L (ref 5–15)
AST SERPL-CCNC: 20 U/L (ref 15–37)
BILIRUB SERPL-MCNC: 0.6 MG/DL (ref 0.2–1)
BUN SERPL-MCNC: 13 MG/DL (ref 6–20)
BUN/CREAT SERPL: 22 (ref 12–20)
CALCIUM SERPL-MCNC: 9 MG/DL (ref 8.5–10.1)
CHLORIDE SERPL-SCNC: 106 MMOL/L (ref 97–108)
CO2 SERPL-SCNC: 27 MMOL/L (ref 21–32)
COMMENT, HOLDF: NORMAL
CREAT SERPL-MCNC: 0.6 MG/DL (ref 0.55–1.02)
GLOBULIN SER CALC-MCNC: 3.5 G/DL (ref 2–4)
GLUCOSE SERPL-MCNC: 111 MG/DL (ref 65–100)
LIPASE SERPL-CCNC: 346 U/L (ref 73–393)
POTASSIUM SERPL-SCNC: 3.8 MMOL/L (ref 3.5–5.1)
PROT SERPL-MCNC: 7.2 G/DL (ref 6.4–8.2)
SAMPLES BEING HELD,HOLD: NORMAL
SODIUM SERPL-SCNC: 139 MMOL/L (ref 136–145)

## 2021-05-03 NOTE — PROGRESS NOTES
Acute hypoxic respiratory failure sec to COVID 19 +  Viral/multifocal PNA  Cardiac arrest   Left PTX   Distributive shock  SANNA on CRRT   Shock liver   MSSA bacteriemia     Low tidal volume ARDSnet ventilation strategy, high PEEP. Currently paralyzed and proned. Repeat ABGs/CXR  Chest tube #2 and PTC w/ air leak, Chest tube #1 w/ no air leak   Sedated w/ fentanyl and propofol and paralyzed w/ Nimbex.  Plan to supine today after family meeting   Titrate pressors to keep MAP > 65. Currently on levophed and vasopressin   S/p 10 days Remdesivir and Actemra 04/20. Remains on IV steroids. On Vanco and Merrem as per ID recs. Trend WBC and inflammatory markers  Plan to continue CRRT for a few days and readdress need. Avoid nephrotoxic agents. Strict I&O with Cr monitoring   Diet: Hold tube feeds for now. IV hydration. Maintain net negative fluid balance   Aggressive glycemic control, maintain BG < 180mg/dl  DVT ppx w/ Lovenox bid. Transfuse PRN to keep Hb>7.0  Contact/airborne isolation  Code status: DNR    Family called in today for bedside meeting, Pt is progressing to multiorgan failure and remains severely acidotic/ hyperkalemic despite being on CRRT and bicarb gtt. Palliative care also consulted              Surgery      Consult received. Pt off floor getting MRI.  + dilated CBD, bili in urine. No gallbladder pathology on imaging. First step in workup is going to be ERCP. GI also consulted. Will see pt and do formal consult in am.  No indication for urgent cholecystectomy today. Krystyna Pendleton.  Naima Kraus MD, [facility]

## 2021-05-26 ENCOUNTER — HOSPITAL ENCOUNTER (OUTPATIENT)
Dept: LAB | Age: 41
Discharge: HOME | End: 2021-05-26

## 2021-05-26 PROCEDURE — 86140 C-REACTIVE PROTEIN: CPT

## 2021-05-26 PROCEDURE — 86803 HEPATITIS C AB TEST: CPT

## 2021-05-26 PROCEDURE — 86038 ANTINUCLEAR ANTIBODIES: CPT

## 2021-05-26 PROCEDURE — 85652 RBC SED RATE AUTOMATED: CPT

## 2021-05-26 PROCEDURE — 85025 COMPLETE CBC W/AUTO DIFF WBC: CPT

## 2021-05-27 LAB
BASOPHILS # BLD: 0 K/UL (ref 0–0.1)
BASOPHILS NFR BLD: 1 % (ref 0–1)
CRP SERPL-MCNC: <0.29 MG/DL (ref 0–0.6)
DIFFERENTIAL METHOD BLD: ABNORMAL
EOSINOPHIL # BLD: 0.1 K/UL (ref 0–0.4)
EOSINOPHIL NFR BLD: 2 % (ref 0–7)
ERYTHROCYTE [DISTWIDTH] IN BLOOD BY AUTOMATED COUNT: 15.3 % (ref 11.5–14.5)
ERYTHROCYTE [SEDIMENTATION RATE] IN BLOOD: 17 MM/HR (ref 0–20)
HCT VFR BLD AUTO: 39 % (ref 35–47)
HCV AB SERPL QL IA: NONREACTIVE
HCV COMMENT,HCGAC: NORMAL
HGB BLD-MCNC: 11.9 G/DL (ref 11.5–16)
IMM GRANULOCYTES # BLD AUTO: 0 K/UL (ref 0–0.04)
IMM GRANULOCYTES NFR BLD AUTO: 0 % (ref 0–0.5)
LYMPHOCYTES # BLD: 1.7 K/UL (ref 0.8–3.5)
LYMPHOCYTES NFR BLD: 29 % (ref 12–49)
MCH RBC QN AUTO: 27.5 PG (ref 26–34)
MCHC RBC AUTO-ENTMCNC: 30.5 G/DL (ref 30–36.5)
MCV RBC AUTO: 90.3 FL (ref 80–99)
MONOCYTES # BLD: 0.5 K/UL (ref 0–1)
MONOCYTES NFR BLD: 9 % (ref 5–13)
NEUTS SEG # BLD: 3.5 K/UL (ref 1.8–8)
NEUTS SEG NFR BLD: 59 % (ref 32–75)
NRBC # BLD: 0 K/UL (ref 0–0.01)
NRBC BLD-RTO: 0 PER 100 WBC
PLATELET # BLD AUTO: 273 K/UL (ref 150–400)
PMV BLD AUTO: 12.3 FL (ref 8.9–12.9)
RBC # BLD AUTO: 4.32 M/UL (ref 3.8–5.2)
WBC # BLD AUTO: 5.9 K/UL (ref 3.6–11)

## 2021-05-28 LAB — ANA SER QL: NEGATIVE

## 2021-06-11 ENCOUNTER — OFFICE VISIT (OUTPATIENT)
Dept: SURGERY | Age: 41
End: 2021-06-11
Payer: SUBSIDIZED

## 2021-06-11 VITALS
OXYGEN SATURATION: 99 % | WEIGHT: 193 LBS | BODY MASS INDEX: 27.63 KG/M2 | HEART RATE: 63 BPM | RESPIRATION RATE: 16 BRPM | TEMPERATURE: 96.8 F | HEIGHT: 70 IN | DIASTOLIC BLOOD PRESSURE: 63 MMHG | SYSTOLIC BLOOD PRESSURE: 111 MMHG

## 2021-06-11 DIAGNOSIS — K80.20 GALLSTONES: Primary | ICD-10-CM

## 2021-06-11 PROCEDURE — 99243 OFF/OP CNSLTJ NEW/EST LOW 30: CPT | Performed by: SURGERY

## 2021-06-11 RX ORDER — IBUPROFEN 200 MG
400 TABLET ORAL
COMMUNITY
End: 2021-06-16

## 2021-06-11 NOTE — PROGRESS NOTES
Identified pt with two pt identifiers(name and ). Reviewed record in preparation for visit and have obtained necessary documentation. All patient medications has been reviewed. Chief Complaint   Patient presents with    Gallbladder Attack     seen at the request of Dr. Jalen short gallbladder        Health Maintenance Due   Topic    DTaP/Tdap/Td series (1 - Tdap)    PAP AKA CERVICAL CYTOLOGY        Vitals:    21 0900   BP: 111/63   Pulse: 63   Resp: 16   Temp: 96.8 °F (36 °C)   SpO2: 99%   Weight: 87.5 kg (193 lb)   Height: 5' 10\" (1.778 m)   PainSc:   0 - No pain       4. Have you been to the ER, urgent care clinic since your last visit? Hospitalized since your last visit? No    5. Have you seen or consulted any other health care providers outside of the [address] [place] [address] since your last visit? Include any pap smears or colon screening. No      Patient is accompanied by spouse I have received verbal consent from Lukas Dupree to discuss any/all medical information while they are present in the room.

## 2021-06-11 NOTE — LETTER
6/11/2021 10:00 AM 
 
Ms. Nolan Rodriguez [address] [id number] Surgery Instruction Sheet You have been scheduled for surgery on Wednesday 6/16/21 at 3:00 pm at [facility]. Please report to the Surgery Center at 12:30, this is approximately 2 hours prior to your surgery time. The Surgery Center is located on the [address] [place] [address] side of the [facility], just next to the Emergency Room. Reserved parking is available and  parking if lot is full. You will not need to have a pre-op visit before surgery, but the Pre-op Nurse will call you before surgery. The Pre-op nurse will review your medical history, medications and give you additional instructions. They will also confirm your arrival time. Call your physician immediately if you notice a change in your health between the time you saw your physician and the day of surgery. Domingo Mota STOP YOUR ASPIRIN 5 DAYS PRIOR TO SURGERY. DO NOT TAKE  IBUPROFEN, ADVIL, MOTRIN, ALEVE, EXCEDRIN, BC POWDER, GOODIES, FISH OIL OR ANY MEDICATION CONTAINING ASPIRIN 5 DAYS PRIOR TO YOUR SURGERY. MAY TAKE TYLENOL. Eat a light dinner the evening before your surgery. DO NOT EAT OR DRINK ANYTHING AFTER MIDNIGHT THE NIGHT BEFORE YOUR SURGERY. This includes water, chewing gum, lifesavers, etc.  The Pre op nurse will check with you about any medication that you may need to take the morning of surgery. Shower with a new bar of anti-bacterial soap (Dial, Safeguard) or solution given to you by Pre-op, the night before surgery. Do not use lotion, powder, deodorant on the skin after showering. Wear loose, comfortable clothing the day of surgery and bring a container to store your contacts, eyeglasses, dentures, hearing aid, etc.  Do not bring money, valuables, jewelry, etc. to the hospital.   
 
If you are having outpatient surgery, someone must come with you the morning of surgery to drive you home.   You can not drive for 24 hours after any anesthesia. Sometimes it is necessary to stay overnight and leave the next morning. This is still considered outpatient for most insurance deductibles. Someone will still need to drive you home. If you have questions or concerns, please feel free to call Dr Korin Perez at [phone]. If you need to cancel your surgery, please call as soon as possible.  
 
 
 
 
Sincerely, 
 
 
Laurie Su MD

## 2021-06-11 NOTE — PROGRESS NOTES
To: Heidi Watson MD  CC:  Jessica Wang MD      From: Titi Solares MD    Thank you for sending Linden Si to see us. Have a great weekend. Please note that this dictation was completed with Cell[employer], the computer voice recognition software. Quite often unanticipated grammatical, syntax, homophones, and other interpretive errors are inadvertently transcribed by the software. Please disregard these errors. Please excuse any errors that have escaped final proofreading. Encounter Date: 6/11/2021  History and Physical    Assessment:   Gallbladder sludge and stones status post ERCP for common bile duct stone that resulted in pancreatitis. She is doing very well now. She is occasionally getting epigastric discomfort and is concerned about developing pancreatitis again. Body mass index is 27.69 kg/m². No significant medical problems. S/p no prior abdominal operations. No aspirin or anticoagulation. Plan/Recommendations/Medical Decision Making: We discussed the merits of possible cholecystectomy with intraoperative cholangiogram.  The benefits include avoiding future bouts of pancreatitis. Discussed alternatives, risks and benefits of surgery. Risks include infection, hematoma, bleeding, bile duct or bowel injury, recurrence or persistence of symptoms, conversion to an open operation, retained CBD stones, possible JOB drain, and the risks of general anesthetic. All questions were answered to the patient's satisfaction. After our discussion, she would like to proceed with cholecystectomy. HPI:   Patient is a 36 y.o. female who is seen in consultation at the request of Jessica Wang MD.   Notes having been admitted to the hospital in February with severe pain. She was found to have pancreatitis and MRCP showed a stone in her bile duct. She underwent ERCP by Dr. Marylee Dad and recovered nicely. She has seen him in follow-up and her labs have normalized.   She is eating well without nausea and vomiting. She does occasionally have a weird feeling in her epigastric region after meals. She has not had any episodes of thor urine, acholic stools, or jaundice. No fevers or chills. History reviewed. No pertinent past medical history. Past Surgical History:   Procedure Laterality Date    HX GYN      HX OTHER SURGICAL      endoscopy 6/21    HX TUBAL LIGATION       Social History     Tobacco Use    Smoking status: Never Smoker    Smokeless tobacco: Never Used   Substance Use Topics    Alcohol use: No      History reviewed. No pertinent family history. Current Outpatient Medications   Medication Sig    Lactobacillus acidophilus (Probiotic) 10 billion cell cap Take 1 Tab by mouth daily.  acetaminophen (TYLENOL) 325 mg tablet Take 650 mg by mouth every four (4) hours as needed for Pain.  ibuprofen (MOTRIN) 800 mg tablet Take 1 Tab by mouth every six (6) hours as needed for Pain.  Omeprazole delayed release (PRILOSEC D/R) 20 mg tablet Take 20 mg by mouth daily. (Patient not taking: Reported on 6/11/2021)     No current facility-administered medications for this visit. Allergies: Allergies   Allergen Reactions    Pcn [Penicillins] Unknown (comments)        Review of Systems:  10 systems reviewed. See scanned sheet in \"Media\" section. See HPI for pertinent positives and negatives. Objective:     Visit Vitals  /63 (BP 1 Location: Right upper arm, BP Patient Position: Sitting, BP Cuff Size: Adult)   Pulse 63   Temp 96.8 °F (36 °C)   Resp 16   Ht 5' 10\" (1.778 m)   Wt 87.5 kg (193 lb)   SpO2 99%   BMI 27.69 kg/m²     General appearance  Alert, cooperative, no distress, appears stated age   HEENT  Anicteric   Neck Supple   Back   No CVA tenderness   Lungs   CTAB   Heart  Regular rate and rhythm. Abdomen   Soft. Bowel sounds normal. No palpable masses. Presently nontender. Extremities No CCE.    Pulses 2+ right radial   Skin Skin color, texture, turgor normal.        Neurologic Without overt sensory or motor deficit     Imaging and Lab Review:   images and reports reviewed    Signed By: Nimesh Mackey MD     June 11, 2021

## 2021-06-11 NOTE — PERIOP NOTES
[facility]  Preoperative Instructions        Surgery Date 6/16/2021          Time of Arrival 1:00pm    1. On the day of your surgery, please report to the Surgical Services Registration Desk and sign in at your designated time. The Surgery Center is located to the right of the Emergency Room. 2. You must have someone with you to drive you home. You should not drive a car for 24 hours following surgery. Please make arrangements for a friend or family member to stay with you for the first 24 hours after your surgery. 3. Do not have anything to eat or drink (including water, gum, mints, coffee, juice) after midnight. ?This may not apply to medications prescribed by your physician. ?(Please note below the special instructions with medications to take the morning of your procedure.)    4. We recommend you do not drink any alcoholic beverages for 24 hours before and after your surgery. 5. Contact your surgeons office for instructions on the following medications: non-steroidal anti-inflammatory drugs (i.e. Advil, Aleve), vitamins, and supplements. (Some surgeons will want you to stop these medications prior to surgery and others may allow you to take them)  **If you are currently taking Plavix, Coumadin, Aspirin and/or other blood-thinning agents, contact your surgeon for instructions. ** Your surgeon will partner with the physician prescribing these medications to determine if it is safe to stop or if you need to continue taking. Please do not stop taking these medications without instructions from your surgeon    6. Wear comfortable clothes. Wear glasses instead of contacts. Do not bring any money or jewelry. Please bring picture ID, insurance card, and any prearranged co-payment or hospital payment. Do not wear make-up, particularly mascara the morning of your surgery. Do not wear nail polish, particularly if you are having foot /hand surgery.   Wear your hair loose or down, no ponytails, buns, nevaeh pins or clips. All body piercings must be removed. Please shower with antibacterial soap for three consecutive days before and on the morning of surgery, but do not apply any lotions, powders or deodorants after the shower on the day of surgery. Please use a fresh towels after each shower. Please sleep in clean clothes and change bed linens the night before surgery. Please do not shave for 48 hours prior to surgery. Shaving of the face is acceptable. 7. You should understand that if you do not follow these instructions your surgery may be cancelled. If your physical condition changes (I.e. fever, cold or flu) please contact your surgeon as soon as possible. 8. It is important that you be on time. If a situation occurs where you may be late, please call [phone] (OR Holding Area). 9. If you have any questions and or problems, please call [phone] (Pre-admission Testing). 10. Your surgery time may be subject to change. You will receive a phone call the evening prior if your time changes. 11.  If having outpatient surgery, you must have someone to drive you here, stay with you during the duration of your stay, and to drive you home at time of discharge. Special Instructions:     TAKE ALL MEDICATIONS DAY OF SURGERY EXCEPT: No exceptions      I understand a pre-operative phone call will be made to verify my surgery time. In the event that I am not available, I give permission for a message to be left on my answering service and/or with another person?   Yes [phone]         ___________________      __________   _________    (Signature of Patient)             (Witness)                (Date and Time)

## 2021-06-11 NOTE — Clinical Note
6/11/2021 Patient: Zion Lozano YOB: 1980 Date of Visit: 6/11/2021 Vitor Perea MD 
[address] [facility] [address] [facility] [address] [address] [address] [employer] [address], [place] 
[address] [address] [address] [employer] Dear Vitor Perea MD 
[address] Martin Cazares MD, Thank you for referring Ms. Zion Lozano to [address] for evaluation. My notes for this consultation are attached. If you have questions, please do not hesitate to call me. I look forward to following your patient along with you.  
 
 
Sincerely, 
 
Aminata Mcbride MD

## 2021-06-16 ENCOUNTER — ANESTHESIA EVENT (OUTPATIENT)
Dept: SURGERY | Age: 41
End: 2021-06-16
Payer: SUBSIDIZED

## 2021-06-16 ENCOUNTER — ANESTHESIA (OUTPATIENT)
Dept: SURGERY | Age: 41
End: 2021-06-16
Payer: SUBSIDIZED

## 2021-06-16 ENCOUNTER — APPOINTMENT (OUTPATIENT)
Dept: GENERAL RADIOLOGY | Age: 41
End: 2021-06-16
Attending: SURGERY
Payer: SUBSIDIZED

## 2021-06-16 ENCOUNTER — HOSPITAL ENCOUNTER (OUTPATIENT)
Age: 41
Setting detail: OUTPATIENT SURGERY
Discharge: HOME | End: 2021-06-16
Attending: SURGERY | Admitting: SURGERY
Payer: SUBSIDIZED

## 2021-06-16 VITALS
BODY MASS INDEX: 29.62 KG/M2 | HEART RATE: 68 BPM | TEMPERATURE: 98 F | SYSTOLIC BLOOD PRESSURE: 110 MMHG | RESPIRATION RATE: 20 BRPM | WEIGHT: 188.71 LBS | HEIGHT: 67 IN | OXYGEN SATURATION: 96 % | DIASTOLIC BLOOD PRESSURE: 65 MMHG

## 2021-06-16 DIAGNOSIS — K82.8 SLUDGE IN GALLBLADDER: Primary | ICD-10-CM

## 2021-06-16 LAB — HCG UR QL: NEGATIVE

## 2021-06-16 PROCEDURE — 74011250637 HC RX REV CODE- 250/637

## 2021-06-16 PROCEDURE — 77030013567 HC DRN WND RESERV BARD -A: Performed by: SURGERY

## 2021-06-16 PROCEDURE — 76210000000 HC OR PH I REC 2 TO 2.5 HR: Performed by: SURGERY

## 2021-06-16 PROCEDURE — 74011250636 HC RX REV CODE- 250/636

## 2021-06-16 PROCEDURE — 77030012770 HC TRCR OPT FX AMR -B: Performed by: SURGERY

## 2021-06-16 PROCEDURE — 74011250636 HC RX REV CODE- 250/636: Performed by: ANESTHESIOLOGY

## 2021-06-16 PROCEDURE — 77030018875 HC APPL CLP LIG4 J&J -B: Performed by: SURGERY

## 2021-06-16 PROCEDURE — 47563 LAPARO CHOLECYSTECTOMY/GRAPH: CPT | Performed by: SURGERY

## 2021-06-16 PROCEDURE — 74011250636 HC RX REV CODE- 250/636: Performed by: NURSE ANESTHETIST, CERTIFIED REGISTERED

## 2021-06-16 PROCEDURE — 74300 X-RAY BILE DUCTS/PANCREAS: CPT | Performed by: SURGERY

## 2021-06-16 PROCEDURE — 81025 URINE PREGNANCY TEST: CPT

## 2021-06-16 PROCEDURE — 77030037032 HC INSRT SCIS CLICKLLINE DISP STOR -B: Performed by: SURGERY

## 2021-06-16 PROCEDURE — 74300 X-RAY BILE DUCTS/PANCREAS: CPT

## 2021-06-16 PROCEDURE — 77030010837 HC CATH CHOL INTRO TELE -B: Performed by: SURGERY

## 2021-06-16 PROCEDURE — 77030020053 HC ELECTRD LAPSCP COVD -B: Performed by: SURGERY

## 2021-06-16 PROCEDURE — 77030002967 HC SUT PDS J&J -B: Performed by: SURGERY

## 2021-06-16 PROCEDURE — 77030009851 HC PCH RTVR ENDOSC AMR -B: Performed by: SURGERY

## 2021-06-16 PROCEDURE — 2709999900 HC NON-CHARGEABLE SUPPLY

## 2021-06-16 PROCEDURE — 74011250637 HC RX REV CODE- 250/637: Performed by: SURGERY

## 2021-06-16 PROCEDURE — 88304 TISSUE EXAM BY PATHOLOGIST: CPT

## 2021-06-16 PROCEDURE — 76010000149 HC OR TIME 1 TO 1.5 HR: Performed by: SURGERY

## 2021-06-16 PROCEDURE — 77030008684 HC TU ET CUF COVD -B: Performed by: ANESTHESIOLOGY

## 2021-06-16 PROCEDURE — 77030008756 HC TU IRR SUC STRY -B: Performed by: SURGERY

## 2021-06-16 PROCEDURE — 77030008771 HC TU NG SALEM SUMP -A: Performed by: ANESTHESIOLOGY

## 2021-06-16 PROCEDURE — 77030019908 HC STETH ESOPH SIMS -A: Performed by: ANESTHESIOLOGY

## 2021-06-16 PROCEDURE — 77030010507 HC ADH SKN DERMBND J&J -B: Performed by: SURGERY

## 2021-06-16 PROCEDURE — 74011000250 HC RX REV CODE- 250: Performed by: NURSE ANESTHETIST, CERTIFIED REGISTERED

## 2021-06-16 PROCEDURE — 77030040361 HC SLV COMPR DVT MDII -B: Performed by: SURGERY

## 2021-06-16 PROCEDURE — 77030008606 HC TRCR ENDOSC KII AMR -B: Performed by: SURGERY

## 2021-06-16 PROCEDURE — 77030002933 HC SUT MCRYL J&J -A: Performed by: SURGERY

## 2021-06-16 PROCEDURE — 77030040922 HC BLNKT HYPOTHRM STRY -A

## 2021-06-16 PROCEDURE — 2709999900 HC NON-CHARGEABLE SUPPLY: Performed by: SURGERY

## 2021-06-16 PROCEDURE — 77030020829: Performed by: SURGERY

## 2021-06-16 PROCEDURE — 74011000250 HC RX REV CODE- 250: Performed by: SURGERY

## 2021-06-16 PROCEDURE — 76060000033 HC ANESTHESIA 1 TO 1.5 HR: Performed by: SURGERY

## 2021-06-16 PROCEDURE — 77030031139 HC SUT VCRL2 J&J -A: Performed by: SURGERY

## 2021-06-16 PROCEDURE — 77030026438 HC STYL ET INTUB CARD -A: Performed by: ANESTHESIOLOGY

## 2021-06-16 RX ORDER — HYDROMORPHONE HYDROCHLORIDE 2 MG/ML
INJECTION, SOLUTION INTRAMUSCULAR; INTRAVENOUS; SUBCUTANEOUS AS NEEDED
Status: DISCONTINUED | OUTPATIENT
Start: 2021-06-16 | End: 2021-06-16 | Stop reason: HOSPADM

## 2021-06-16 RX ORDER — EPHEDRINE SULFATE/0.9% NACL/PF 50 MG/5 ML
SYRINGE (ML) INTRAVENOUS AS NEEDED
Status: DISCONTINUED | OUTPATIENT
Start: 2021-06-16 | End: 2021-06-16 | Stop reason: HOSPADM

## 2021-06-16 RX ORDER — HYDROCODONE BITARTRATE AND ACETAMINOPHEN 5; 325 MG/1; MG/1
1 TABLET ORAL ONCE
Status: COMPLETED | OUTPATIENT
Start: 2021-06-16 | End: 2021-06-16

## 2021-06-16 RX ORDER — BUPIVACAINE HYDROCHLORIDE AND EPINEPHRINE 5; 5 MG/ML; UG/ML
INJECTION, SOLUTION PERINEURAL AS NEEDED
Status: DISCONTINUED | OUTPATIENT
Start: 2021-06-16 | End: 2021-06-16 | Stop reason: HOSPADM

## 2021-06-16 RX ORDER — HYDROMORPHONE HYDROCHLORIDE 1 MG/ML
.2-.5 INJECTION, SOLUTION INTRAMUSCULAR; INTRAVENOUS; SUBCUTANEOUS
Status: DISCONTINUED | OUTPATIENT
Start: 2021-06-16 | End: 2021-06-16 | Stop reason: HOSPADM

## 2021-06-16 RX ORDER — ONDANSETRON 2 MG/ML
4 INJECTION INTRAMUSCULAR; INTRAVENOUS AS NEEDED
Status: DISCONTINUED | OUTPATIENT
Start: 2021-06-16 | End: 2021-06-16 | Stop reason: HOSPADM

## 2021-06-16 RX ORDER — MIDAZOLAM HYDROCHLORIDE 1 MG/ML
INJECTION, SOLUTION INTRAMUSCULAR; INTRAVENOUS AS NEEDED
Status: DISCONTINUED | OUTPATIENT
Start: 2021-06-16 | End: 2021-06-16 | Stop reason: HOSPADM

## 2021-06-16 RX ORDER — HYDROCODONE BITARTRATE AND ACETAMINOPHEN 5; 325 MG/1; MG/1
1-2 TABLET ORAL
Qty: 30 TABLET | Refills: 0 | Status: SHIPPED | OUTPATIENT
Start: 2021-06-16 | End: 2021-06-21

## 2021-06-16 RX ORDER — LIDOCAINE HYDROCHLORIDE 10 MG/ML
0.1 INJECTION, SOLUTION EPIDURAL; INFILTRATION; INTRACAUDAL; PERINEURAL AS NEEDED
Status: DISCONTINUED | OUTPATIENT
Start: 2021-06-16 | End: 2021-06-16 | Stop reason: HOSPADM

## 2021-06-16 RX ORDER — HYDROCODONE BITARTRATE AND ACETAMINOPHEN 5; 325 MG/1; MG/1
TABLET ORAL
Status: COMPLETED
Start: 2021-06-16 | End: 2021-06-16

## 2021-06-16 RX ORDER — IBUPROFEN 800 MG/1
800 TABLET ORAL
Qty: 21 TABLET | Refills: 0 | Status: SHIPPED | OUTPATIENT
Start: 2021-06-16

## 2021-06-16 RX ORDER — FENTANYL CITRATE 50 UG/ML
50 INJECTION, SOLUTION INTRAMUSCULAR; INTRAVENOUS AS NEEDED
Status: DISCONTINUED | OUTPATIENT
Start: 2021-06-16 | End: 2021-06-16 | Stop reason: HOSPADM

## 2021-06-16 RX ORDER — LIDOCAINE HYDROCHLORIDE 20 MG/ML
INJECTION, SOLUTION EPIDURAL; INFILTRATION; INTRACAUDAL; PERINEURAL AS NEEDED
Status: DISCONTINUED | OUTPATIENT
Start: 2021-06-16 | End: 2021-06-16 | Stop reason: HOSPADM

## 2021-06-16 RX ORDER — SODIUM CHLORIDE, SODIUM LACTATE, POTASSIUM CHLORIDE, CALCIUM CHLORIDE 600; 310; 30; 20 MG/100ML; MG/100ML; MG/100ML; MG/100ML
25 INJECTION, SOLUTION INTRAVENOUS CONTINUOUS
Status: DISCONTINUED | OUTPATIENT
Start: 2021-06-16 | End: 2021-06-16 | Stop reason: HOSPADM

## 2021-06-16 RX ORDER — NEOSTIGMINE METHYLSULFATE 1 MG/ML
INJECTION, SOLUTION INTRAVENOUS AS NEEDED
Status: DISCONTINUED | OUTPATIENT
Start: 2021-06-16 | End: 2021-06-16 | Stop reason: HOSPADM

## 2021-06-16 RX ORDER — POLYETHYLENE GLYCOL 3350 17 G/17G
17 POWDER, FOR SOLUTION ORAL DAILY
Qty: 510 G | Refills: 0 | Status: SHIPPED | OUTPATIENT
Start: 2021-06-16

## 2021-06-16 RX ORDER — PHENYLEPHRINE HCL IN 0.9% NACL 0.4MG/10ML
SYRINGE (ML) INTRAVENOUS AS NEEDED
Status: DISCONTINUED | OUTPATIENT
Start: 2021-06-16 | End: 2021-06-16 | Stop reason: HOSPADM

## 2021-06-16 RX ORDER — ONDANSETRON 2 MG/ML
INJECTION INTRAMUSCULAR; INTRAVENOUS AS NEEDED
Status: DISCONTINUED | OUTPATIENT
Start: 2021-06-16 | End: 2021-06-16 | Stop reason: HOSPADM

## 2021-06-16 RX ORDER — KETOROLAC TROMETHAMINE 30 MG/ML
INJECTION, SOLUTION INTRAMUSCULAR; INTRAVENOUS
Status: COMPLETED
Start: 2021-06-16 | End: 2021-06-16

## 2021-06-16 RX ORDER — MIDAZOLAM HYDROCHLORIDE 1 MG/ML
1 INJECTION, SOLUTION INTRAMUSCULAR; INTRAVENOUS AS NEEDED
Status: DISCONTINUED | OUTPATIENT
Start: 2021-06-16 | End: 2021-06-16 | Stop reason: HOSPADM

## 2021-06-16 RX ORDER — FENTANYL CITRATE 50 UG/ML
25 INJECTION, SOLUTION INTRAMUSCULAR; INTRAVENOUS
Status: DISCONTINUED | OUTPATIENT
Start: 2021-06-16 | End: 2021-06-16 | Stop reason: HOSPADM

## 2021-06-16 RX ORDER — ROCURONIUM BROMIDE 10 MG/ML
INJECTION, SOLUTION INTRAVENOUS AS NEEDED
Status: DISCONTINUED | OUTPATIENT
Start: 2021-06-16 | End: 2021-06-16 | Stop reason: HOSPADM

## 2021-06-16 RX ORDER — PROPOFOL 10 MG/ML
INJECTION, EMULSION INTRAVENOUS AS NEEDED
Status: DISCONTINUED | OUTPATIENT
Start: 2021-06-16 | End: 2021-06-16 | Stop reason: HOSPADM

## 2021-06-16 RX ORDER — GLYCOPYRROLATE 0.2 MG/ML
INJECTION INTRAMUSCULAR; INTRAVENOUS AS NEEDED
Status: DISCONTINUED | OUTPATIENT
Start: 2021-06-16 | End: 2021-06-16 | Stop reason: HOSPADM

## 2021-06-16 RX ORDER — DEXAMETHASONE SODIUM PHOSPHATE 4 MG/ML
INJECTION, SOLUTION INTRA-ARTICULAR; INTRALESIONAL; INTRAMUSCULAR; INTRAVENOUS; SOFT TISSUE AS NEEDED
Status: DISCONTINUED | OUTPATIENT
Start: 2021-06-16 | End: 2021-06-16 | Stop reason: HOSPADM

## 2021-06-16 RX ORDER — KETOROLAC TROMETHAMINE 30 MG/ML
30 INJECTION, SOLUTION INTRAMUSCULAR; INTRAVENOUS ONCE
Status: COMPLETED | OUTPATIENT
Start: 2021-06-16 | End: 2021-06-16

## 2021-06-16 RX ORDER — FENTANYL CITRATE 50 UG/ML
INJECTION, SOLUTION INTRAMUSCULAR; INTRAVENOUS AS NEEDED
Status: DISCONTINUED | OUTPATIENT
Start: 2021-06-16 | End: 2021-06-16 | Stop reason: HOSPADM

## 2021-06-16 RX ORDER — ONDANSETRON 2 MG/ML
INJECTION INTRAMUSCULAR; INTRAVENOUS
Status: COMPLETED
Start: 2021-06-16 | End: 2021-06-16

## 2021-06-16 RX ORDER — DEXMEDETOMIDINE HYDROCHLORIDE 100 UG/ML
INJECTION, SOLUTION INTRAVENOUS AS NEEDED
Status: DISCONTINUED | OUTPATIENT
Start: 2021-06-16 | End: 2021-06-16 | Stop reason: HOSPADM

## 2021-06-16 RX ADMIN — HYDROCODONE BITARTRATE AND ACETAMINOPHEN 1 TABLET: 5; 325 TABLET ORAL at 19:00

## 2021-06-16 RX ADMIN — DEXMEDETOMIDINE HYDROCHLORIDE 4 MCG: 100 INJECTION, SOLUTION, CONCENTRATE INTRAVENOUS at 17:23

## 2021-06-16 RX ADMIN — FENTANYL CITRATE 50 MCG: 50 INJECTION, SOLUTION INTRAMUSCULAR; INTRAVENOUS at 16:58

## 2021-06-16 RX ADMIN — GLYCOPYRROLATE 0.4 MG: 0.2 INJECTION, SOLUTION INTRAMUSCULAR; INTRAVENOUS at 17:39

## 2021-06-16 RX ADMIN — DEXMEDETOMIDINE HYDROCHLORIDE 2 MCG: 100 INJECTION, SOLUTION, CONCENTRATE INTRAVENOUS at 17:15

## 2021-06-16 RX ADMIN — PROPOFOL 20 MG: 10 INJECTION, EMULSION INTRAVENOUS at 17:39

## 2021-06-16 RX ADMIN — Medication 40 MCG: at 17:10

## 2021-06-16 RX ADMIN — SODIUM CHLORIDE, POTASSIUM CHLORIDE, SODIUM LACTATE AND CALCIUM CHLORIDE 25 ML/HR: 600; 310; 30; 20 INJECTION, SOLUTION INTRAVENOUS at 15:58

## 2021-06-16 RX ADMIN — DEXAMETHASONE SODIUM PHOSPHATE 4 MG: 4 INJECTION, SOLUTION INTRAMUSCULAR; INTRAVENOUS at 17:01

## 2021-06-16 RX ADMIN — KETOROLAC TROMETHAMINE 30 MG: 30 INJECTION, SOLUTION INTRAMUSCULAR; INTRAVENOUS at 19:10

## 2021-06-16 RX ADMIN — ONDANSETRON HYDROCHLORIDE 4 MG: 2 INJECTION, SOLUTION INTRAMUSCULAR; INTRAVENOUS at 19:00

## 2021-06-16 RX ADMIN — ROCURONIUM BROMIDE 30 MG: 10 INJECTION INTRAVENOUS at 16:52

## 2021-06-16 RX ADMIN — DEXMEDETOMIDINE HYDROCHLORIDE 6 MCG: 100 INJECTION, SOLUTION, CONCENTRATE INTRAVENOUS at 17:27

## 2021-06-16 RX ADMIN — HYDROMORPHONE HYDROCHLORIDE 0.4 MG: 2 INJECTION, SOLUTION INTRAMUSCULAR; INTRAVENOUS; SUBCUTANEOUS at 17:05

## 2021-06-16 RX ADMIN — DEXMEDETOMIDINE HYDROCHLORIDE 6 MCG: 100 INJECTION, SOLUTION, CONCENTRATE INTRAVENOUS at 17:06

## 2021-06-16 RX ADMIN — Medication 3 AMPULE: at 15:58

## 2021-06-16 RX ADMIN — DEXMEDETOMIDINE HYDROCHLORIDE 4 MCG: 100 INJECTION, SOLUTION, CONCENTRATE INTRAVENOUS at 17:30

## 2021-06-16 RX ADMIN — HYDROMORPHONE HYDROCHLORIDE 0.4 MG: 2 INJECTION, SOLUTION INTRAMUSCULAR; INTRAVENOUS; SUBCUTANEOUS at 17:54

## 2021-06-16 RX ADMIN — PROPOFOL 200 MG: 10 INJECTION, EMULSION INTRAVENOUS at 16:52

## 2021-06-16 RX ADMIN — ONDANSETRON HYDROCHLORIDE 4 MG: 2 INJECTION, SOLUTION INTRAMUSCULAR; INTRAVENOUS at 17:33

## 2021-06-16 RX ADMIN — GLYCOPYRROLATE 0.2 MG: 0.2 INJECTION, SOLUTION INTRAMUSCULAR; INTRAVENOUS at 17:10

## 2021-06-16 RX ADMIN — LIDOCAINE HYDROCHLORIDE 80 MG: 20 INJECTION, SOLUTION EPIDURAL; INFILTRATION; INTRACAUDAL; PERINEURAL at 16:51

## 2021-06-16 RX ADMIN — FENTANYL CITRATE 50 MCG: 50 INJECTION, SOLUTION INTRAMUSCULAR; INTRAVENOUS at 16:51

## 2021-06-16 RX ADMIN — ONDANSETRON 4 MG: 2 INJECTION INTRAMUSCULAR; INTRAVENOUS at 19:00

## 2021-06-16 RX ADMIN — NEOSTIGMINE METHYLSULFATE 3 MG: 1 INJECTION, SOLUTION INTRAVENOUS at 17:39

## 2021-06-16 RX ADMIN — DEXMEDETOMIDINE HYDROCHLORIDE 4 MCG: 100 INJECTION, SOLUTION, CONCENTRATE INTRAVENOUS at 17:18

## 2021-06-16 RX ADMIN — Medication 10 MG: at 17:11

## 2021-06-16 RX ADMIN — Medication 40 MCG: at 17:49

## 2021-06-16 RX ADMIN — MIDAZOLAM HYDROCHLORIDE 2 MG: 1 INJECTION, SOLUTION INTRAMUSCULAR; INTRAVENOUS at 16:46

## 2021-06-16 RX ADMIN — DEXAMETHASONE SODIUM PHOSPHATE 4 MG: 4 INJECTION, SOLUTION INTRAMUSCULAR; INTRAVENOUS at 17:05

## 2021-06-16 RX ADMIN — DEXMEDETOMIDINE HYDROCHLORIDE 4 MCG: 100 INJECTION, SOLUTION, CONCENTRATE INTRAVENOUS at 17:13

## 2021-06-16 NOTE — ANESTHESIA PREPROCEDURE EVALUATION
Relevant Problems   No relevant active problems       Anesthetic History   No history of anesthetic complications            Review of Systems / Medical History  Patient summary reviewed, nursing notes reviewed and pertinent labs reviewed    Pulmonary  Within defined limits                 Neuro/Psych   Within defined limits           Cardiovascular  Within defined limits                Exercise tolerance: >4 METS     GI/Hepatic/Renal               Comments: Choledocholithiasis  Obstructive Jaundice  Elevated Transaminases Endo/Other        Obesity and anemia     Other Findings              Physical Exam    Airway  Mallampati: II  TM Distance: 4 - 6 cm  Neck ROM: normal range of motion   Mouth opening: Normal     Cardiovascular  Regular rate and rhythm,  S1 and S2 normal,  no murmur, click, rub, or gallop             Dental  No notable dental hx       Pulmonary  Breath sounds clear to auscultation               Abdominal  GI exam deferred       Other Findings            Anesthetic Plan    ASA: 2  Anesthesia type: general    Monitoring Plan: BIS      Induction: Intravenous  Anesthetic plan and risks discussed with: Patient

## 2021-06-16 NOTE — PERIOP NOTES
TRANSFER - IN REPORT:    Verbal report received from pieter(name) on Robert Ar  being received from [facility](unit) for routine post - op      Report consisted of patients Situation, Background, Assessment and   Recommendations(SBAR). Information from the following report(s) OR Summary was reviewed with the receiving nurse. Opportunity for questions and clarification was provided. Assessment completed upon patients arrival to unit and care assumed.

## 2021-06-16 NOTE — ANESTHESIA POSTPROCEDURE EVALUATION
Procedure(s):  LAPAROSCOPIC CHOLECYSTECTOMY WITH CHOLANGIOGRAM.    general    Anesthesia Post Evaluation      Multimodal analgesia: multimodal analgesia used between 6 hours prior to anesthesia start to PACU discharge  Patient location during evaluation: bedside  Patient participation: complete - patient participated  Level of consciousness: awake  Pain management: adequate  Airway patency: patent  Anesthetic complications: no  Cardiovascular status: acceptable  Respiratory status: acceptable  Hydration status: acceptable  Post anesthesia nausea and vomiting:  controlled  Final Post Anesthesia Temperature Assessment:  Normothermia (36.0-37.5 degrees C)      INITIAL Post-op Vital signs:   Vitals Value Taken Time   /66 06/16/21 1900   Temp     Pulse 58 06/16/21 1912   Resp 11 06/16/21 1912   SpO2 99 % 06/16/21 1912   Vitals shown include unvalidated device data.

## 2021-06-16 NOTE — PERIOP NOTES
15:29= has had both Pfizer vaccinations; copied and placed in chart. Warming blanket applied. No mepilex dsg applied due to short procedure duration. 15:40= UPT negative. 15:51= pt has understood all questions and states she does not need ; I informed that if at any time she doesn't understand anything that we have the  Line available.

## 2021-06-16 NOTE — PERIOP NOTES
Discharge Info discussed and questions answered, with patients , Lorrie Lincoln. Signature sheet signed and placed in chart.

## 2021-06-16 NOTE — DISCHARGE INSTRUCTIONS
Discharge Instructions:  Laparoscopic Cholecystectomy (Gallbladder Removal)  Dr. Catarino Mccartney    Call on next business day to arrange appointment for follow up in 3 weeks -- [phone]. Activity:  Walk regularly - can walk today as tolerated, but make yourself walk at least 50 yards at least 6 times per day starting tomorrow. No lifting more than 10 -15 pounds for 4 weeks. You may resume driving in 5-7 days unless still requiring narcotics for pain. Work:  You may return to work in 1 or 2 weeks to light activity. No lifting more than 10 pounds (=\"light duty\") for four weeks. If your employer can not accommodate \"light duty,\" your employer will need to provide any necessary paperwork to our office. This document with serve as the initial \"note\" to your employer. Diet:  You may resume normal diet after 24 hours. Fatty foods may still cause some stomach upset. Avoid fatty/greasy foods for 1 month, then add back slowly. Wound Care:  Dermabond glue dressing will fall off over the next couple of weeks. It is waterproof. You may shower, but no swimming or baths for 2 weeks. Your incisions may ooze for a few days. Do not worry about this. If you experience a lot of drainage, develop redness around the wound, or a fever over 101 F occurs please call the office. Medications:  See attached \"Medication Reconciliation. \"    Resume home medications as indicated on the Medical Reconciliation form. Do not use blood thinners (such as Aspirin, Coumadin, or Plavix) until 3 days after surgery. Pain medications:  Non steroidal antiinflammatories (ibuprofen -- Advil or Motrin) seem to work best for post surgical pain. Try these first.  A narcotic prescription will also be given for breakthrough pain. Do not use Tylenol while taking the narcotic because there is Tylenol in the narcotic pill, and too much Tylenol can injure your liver.   Tylenol can be used in place of the narcotic, but do not take both at the same time. PUT ICE ON YOUR INCISION(S) 20 MINUTES EVERY HOUR WHILE AWAKE FOR THE NEXT 3 DAYS AT LEAST. PLACE A THIN CLOTH BETWEEN YOUR SKIN AND THE ICE BAG. Colace or Miralax should be used twice daily to prevent constipation while on narcotics. If you are still having trouble having a BM after 1-2 days, try milk of magnesia. If this does not work within 24 hours, try a bottle of magnesium citrate. If this does not work, call us. Narcotics and anesthesia sometimes cause nausea and vomiting. If persistent please call the office. Do not hesitate to call with questions or concerns. Rich Cid MD  [facility]. Tel. [phone]  Fax [phone] from Nurse    PATIENT INSTRUCTIONS:    After general anesthesia or intravenous sedation, for 24 hours or while taking prescription Narcotics:  · Limit your activities  · Do not drive and operate hazardous machinery  · Do not make important personal or business decisions  · Do  not drink alcoholic beverages  · If you have not urinated within 8 hours after discharge, please contact your surgeon on call. Report the following to your surgeon:  · Excessive pain, swelling, redness or odor of or around the surgical area  · Temperature over 100.5  · Nausea and vomiting lasting longer than 4 hours or if unable to take medications  · Any signs of decreased circulation or nerve impairment to extremity: change in color, persistent  numbness, tingling, coldness or increase pain  · Any questions    The discharge information has been reviewed with the patient and spouse. The patient and spouse verbalized understanding.   Discharge medications reviewed with the patient and spouse and appropriate educational materials and side effects teaching were provided. ___________________________________________________________________________________________________________________________________    Patient Education      Hydrocodone/Acetaminophen (Vicodin, Norco, Lortab) - (By mouth)   Why this medicine is used:   Treats pain. Contact a nurse or doctor right away if you have:  · Blistering, peeling, red skin rash  · Fast or slow heartbeat, shallow breathing, blue lips, fingernails, or skin  · Anxiety, restlessness, muscle spasms, twitching, seeing or hearing things that are not there  · Dark urine or pale stools, yellow skin or eyes  · Extreme weakness, sweating, seizures, cold or clammy skin  · Lightheadedness, dizziness, fainting, fever, sweating     Common side effects:  · Constipation, nausea, vomiting, loss of appetite, stomach pain  · Tiredness or sleepiness  © 2017 [facility] Information is for End User's use only and may not be sold, redistributed or otherwise used for commercial purposes. Patient Education      Hidrocodona/acetaminofeno (Vicodin, Norco, Lortab) - (Por vía oral)   Para qué se utiliza srinivas medicamento:   Trata el dolor. Comuníquese de inmediato con un médico o enfermera si usted tiene:  · Ampollas, despellejamiento, sarpullido oliver en la piel. · Latidos cardíacos rápidos o lentos, respiración superficial, labios, uñas o piel de color azulado  · Ansiedad, inquietud, espasmos musculares, contorsiones musculares, wander o escuchar cosas que no existen  · Orina oscura o heces pálidas, ojos o piel amarilla  · Debilidad extrema, sudoración, convulsiones, piel fría o pegajosa  · Desvanecimiento, mareos, desmayos, fiebre, sudoración     Efectos secundarios comunes:  · Estreñimiento, náuseas, vómitos, pérdida del apetito, dolor de estómago  · [employer] o somnolencia  © 2017 [facility] Information is for End User's use only and may not be sold, redistributed or otherwise used for commercial purposes.   Patient Education   Ibuprofen (By mouth)   Ibuprofen (eye-bue-PROE-fen)  Treats pain and fever. This medicine is an NSAID. Brand Name(s): Advil, Advil Children's, Advil Liqui-Gels, Advil Migraine, All-Purpose First Aid Kit, Children's Ibuprofen, Children's Motrin, Comfort Pac, Concentrated Motrin Infants' Drops, Equate Ibuprofen Justo Strength, Genpril, Good [facility]ighbor Ibuprofen Infants', [facility] Children's Ibuprofen, [facility] Ibuprofen, [facility] Ibuprofen Justo Strength   There may be other brand names for this medicine. When This Medicine Should Not Be Used: This medicine is not right for everyone. Do not use if you had an allergic reaction (including asthma) to ibuprofen, aspirin, or another NSAID, or right before or after heart surgery. How to Use This Medicine:   Capsule, Liquid Filled Capsule, Suspension, Tablet, Chewable Tablet  · Your doctor will tell you how much medicine to use. Do not use more than directed. · Prescription ibuprofen should come with a Medication Guide. Ask your pharmacist for the Medication Guide if you do not have one. · Follow the instructions on the medicine label if you are using this medicine without a prescription. · Take this medicine with food or milk if it upsets your stomach. · Oral liquid: Shake well just before using. Measure with a marked measuring spoon, oral syringe, or medicine cup. · Chewable tablet: Chew completely before you swallow it. Then drink some water to make sure you swallow all of the medicine. · For Children: Ask your pharmacist if you are not sure how much medicine to give a child. The dose is usually based on weight, not age. Never give more medicine than directed. · For Adults: Do not take more than 6 pills in 1 day (24 hours) unless your doctor tells you to. · Missed dose: If you take this medicine on a regular basis and miss a dose, take it as soon as you can.  If it is almost time for your next dose, wait until then to use the medicine and skip the missed dose. Do not use extra medicine to make up for a missed dose. · Store the medicine in a closed container at room temperature, away from heat, moisture, and direct light. Do not freeze the oral liquid. Drugs and Foods to Avoid:   Ask your doctor or pharmacist before using any other medicine, including over-the-counter medicines, vitamins, and herbal products. · Some foods and medicine can affect how ibuprofen works. Tell your doctor if you are also using lithium, methotrexate, a blood thinner (such as warfarin), a steroid medicine (such as hydrocortisone, prednisolone, prednisone), a diuretic (water pill), or an ACE inhibitor blood pressure medicine. · Do not use any other NSAID medicine unless your doctor says it is okay. Some other NSAIDs are aspirin, diclofenac, naproxen, or celecoxib. · Do not drink alcohol while you are using this medicine. Warnings While Using This Medicine:   · Tell your doctor if you are pregnant or breastfeeding. Do not use this medicine during the later part of pregnancy. · Tell your doctor if you have kidney disease, liver disease, asthma, lupus or a similar connective tissue disease, or a history of ulcers or other digestion problems. Tell your doctor if you smoke or have heart or blood circulation problems, including high blood pressure, heart failure (CHF), or bleeding problems. · This medicine may cause the following problems:  ¨ Bleeding and ulcers in the stomach or intestines  ¨ Higher risk of heart attack or stroke  ¨ Liver damage  ¨ Kidney damage  ¨ Vision problems  · Call your doctor if symptoms get worse, pain lasts more than 10 days, or fever lasts more than 3 days. · This medicine might contain sugar or phenylalanine (aspartame). · Tell any doctor or dentist who treats you that you are using this medicine. · Keep all medicine out of the reach of children. Never share your medicine with anyone.   Possible Side Effects While Using This Medicine:   Call your doctor right away if you notice any of these side effects:  · Allergic reaction: Itching or hives, swelling in your face or hands, swelling or tingling in your mouth or throat, chest tightness, trouble breathing  · Blistering, peeling, or red skin rash  · Change in how much or how often you urinate  · Chest pain that may spread to your arms, jaw, back, or neck, trouble breathing, nausea, unusual sweating, faintness  · Chest pain, trouble breathing, weakness on one side of your body, severe headache, trouble seeing or talking, pain in your lower leg  · Dark urine or pale stools, nausea, vomiting, loss of appetite, stomach pain, yellow skin or eyes  · Fever, neck pain, stiff neck  · Severe stomach pain, vomiting blood, bloody or black, tarry stools  · Swelling in your hands, ankles, or feet, rapid weight gain  · Trouble seeing, blind spots, change in how you see colors  · Unusual bleeding, bruising, or weakness  If you notice these less serious side effects, talk with your doctor:   · Constipation, diarrhea, gas, mild upset stomach  · Dizziness, headache, ringing in the ears  If you notice other side effects that you think are caused by this medicine, tell your doctor. Call your doctor for medical advice about side effects. You may report side effects to FDA at 9-501-FDA-4266  © 2017 [facility] Information is for End User's use only and may not be sold, redistributed or otherwise used for commercial purposes. The above information is an  only. It is not intended as medical advice for individual conditions or treatments. Talk to your doctor, nurse or pharmacist before following any medical regimen to see if it is safe and effective for you. Patient Education      Ibuprofeno (Advil, Children's Advil, Motrin, Children's Ibuprofen) - (Por vía oral)   Para qué se utiliza srinivas medicamento:   Se [place] para tratar el dolor y la fiebre.  Srinivas es un medicamento antiinflamatorio no esteroideo Susa Josh). Comuníquese de inmediato con un médico o enfermera si usted tiene:  · Cambio en la cantidad y frecuencia con la que orina  · Dolor de estómago severo, vómito con best, evacuaciones intestinales con best o de color jesica y con aparencia de alquitrán  · [employer], tobillos o pies; aumento rápido de peso     Efectos secundarios comunes:  · Estreñimiento, diarrea, gases (flatulencia), malestar estomacal leve  · Zumbido en shade oídos, arthur garcia  © 2017 [facility] Information is for [employer] use only and may not be sold, redistributed or otherwise used for commercial purposes.

## 2021-06-16 NOTE — OP NOTES
DATE OF PROCEDURE:  6/16/2021     PREOPERATIVE DIAGNOSIS:   Gallbladder sludge, prior pancreatitis. POSTOPERATIVE DIAGNOSIS:   Same    OPERATIVE PROCEDURE:  Laparoscopic cholecystectomy with cholangiogram, interpretation of cholangiogram (CPT 28902 42660)    SURGEON:  Layne Tse MD    ANESTHESIA:  General endotracheal.    EBL: minimal    SPECIMENS:   ID Type Source Tests Collected by Time Destination   1 : Gallbladder and contents  Preservative Gallbladder  Domenico Salguero MD 6/16/2021 1730 Pathology        FINDINGS:  Mild inflammation of the gallbladder. No CBD filling defect on cholangiogram.    INDICATIONS:  Pancreatitis. Sludge on ultrasound. Abnormal gallbladder EF on HIDA with exact reproduction of post-prandial pain with CCK administration. DESCRIPTION OF PROCEDURE:  After obtaining informed consent, the patient was taken to the operating room and placed supine on the operating table. An operative time-out was performed, and general endotracheal anesthesia was induced. Preoperative antibiotics were administered, and the abdomen was prepped and draped in the usual sterile fashion. The abdomen was then entered just above the umbilicus using a 5-mm optical trocar. The abdomen was the insufflated without incident and was visually explored. There was no other visible pathology noted. Two right upper quadrant 5-mm ports were placed under direct vision, followed by a 12-mm port in the subxiphoid position. Local anesthetic was infiltrated at the port sites prior to placement. The gallbladder fundus was then grasped and retracted cephalad over the liver. The triangle of Calot was exposed, and the cystic duct and artery were skeletonized using a combination of blunt dissection with a [place] dissector and hook electrocautery. The cystic artery was then divided between clips with 2 clips left on the patient side.   The cystic duct was then traced from the gallbladder infundibulum into its insertion into the portal triad. The cystic duct was swept with a grasper up into the gallbladder. A clip was placed on the gallbladder infundibulum and an incision in the cystic duct adjacent to the gallbladder infundibulum was made with kaylin and the cholangiogram catheter was inserted and the cholangiogram was performed with the above noted findings. The cystic duct was then divided between clips directly adjacent to the gallbladder infundibulum, with 3 clips left on the patient's side. A 0 PDS Endoloop was applied to the cystic duct on the patient side of the clip. The gallbladder was then removed from the liver bed using hook electrocautery. It was removed from the abdominal cavity using a bag through the subxiphoid incision. The liver bed was inspected for hemostasis, and excellent hemostasis was achieved with minimal electrocautery. The clips on the cystic duct and artery were again visualized and were intact. The area below and lateral to the liver was suction irrigated until clear. The right upper quadrant ports were removed under direct vision and both were hemostatic. The abdomen was then desufflated through the subxiphoid port under direct vision via the umbilical port. These ports were subsequently removed and the fascial defect at the 12-mm incision was closed with an interrupted 0 Vicryl suture. The incisions were irrigated with sterile saline and made hemostatic with minimal electrocautery. They were closed with buried interrupted 4-0 Monocryl sutures, and dressed with sterile dressing. The patient was recovered from general anesthesia and taken to the recovery area in satisfactory condition. All instrument, sponge, and needle counts were reported as correct.     Leigha June MD

## 2021-06-17 NOTE — H&P
H&P    Assessment:   GALLSTONES    Body mass index is 29.56 kg/m². Plan:   LAPAROSCOPIC CHOLECYSTECTOMY WITH CHOLANGIOGRAM (N/A Abdomen) Discussed the risk of surgery including bleeding, infection, injury to adjacent structures, and the risks of general anesthetic. The patient understands the risks; any and all questions were answered to the patient's satisfaction. The patient was counseled at length about the risks of graham Covid-19 during their perioperative period and any recovery window from their procedure. The patient was made aware that graham Covid-19  may worsen their prognosis for recovering from their procedure and lend to a higher morbidity and/or mortality risk. All material risks, benefits, and reasonable alternatives including postponing the procedure were discussed. The patient wishes to proceed with the procedure at this time. Subjective:      Yan Meneses is a 36 y.o. female who presents for above procedure. Objective:   Blood pressure 110/65, pulse 68, temperature 98 °F (36.7 °C), resp. rate 20, height 5' 7\" (1.702 m), weight 85.6 kg (188 lb 11.4 oz), last menstrual period 2021, SpO2 96 %. Temp (24hrs), Av °F (36.7 °C), Min:98 °F (36.7 °C), Max:98 °F (36.7 °C)      Physical Exam:  PHYSICAL EXAM:    Chest:   [x]   CTA bialterally, no wheezing/rhonchi/rales/crackles    []   wheezing     []   rhonchi     []   crackles     []   use of accessory muscles    Heart:  [x]   regular rate and rhythm     [x]   No murmurs/rubs/gallops    []   irregular rhythm     []   Murmur     []   Rubs     []   Gallops         Past Medical History:   Diagnosis Date    Pancreatitis      Past Surgical History:   Procedure Laterality Date    HX GYN      HX OTHER SURGICAL      endoscopy     HX TUBAL LIGATION        History reviewed. No pertinent family history.   Social History     Socioeconomic History    Marital status:      Spouse name: Not on file    Number of children: Not on file    Years of education: Not on file    Highest education level: Not on file   Tobacco Use    Smoking status: Never Smoker    Smokeless tobacco: Never Used   Vaping Use    Vaping Use: Never used   Substance and Sexual Activity    Alcohol use: No    Drug use: No     Social Determinants of Health     Financial Resource Strain:     Difficulty of Paying Living Expenses:    Food Insecurity:     Worried About Running Out of Food in the Last Year:     Ran Out of Food in the Last Year:    Transportation Needs:     Lack of Transportation (Medical):  Lack of Transportation (Non-Medical):    Physical Activity:     Days of Exercise per Week:     Minutes of Exercise per Session:    Stress:     Feeling of Stress :    Social Connections:     Frequency of Communication with Friends and Family:     Frequency of Social Gatherings with Friends and Family:     Attends Alevism Services:     Active Member of Clubs or Organizations:     Attends Club or Organization Meetings:     Marital Status:       Prior to Admission medications    Medication Sig Start Date End Date Taking? Authorizing Provider   ibuprofen (MOTRIN) 800 mg tablet Take 1 Tablet by mouth three (3) times daily (with meals). May use over-the-counter equivalent instead. 6/16/21  Yes Michael Hilliard MD   polyethylene glycol [facility]) 17 gram/dose powder Take 17 g by mouth daily. 6/16/21  Yes Michael Hilliard MD   HYDROcodone-acetaminophen (Norco) 5-325 mg per tablet Take 1-2 Tablets by mouth every six (6) hours as needed for Pain for up to 5 days. Max Daily Amount: 8 Tablets. 6/16/21 6/21/21 Yes Michael Hilliard MD   Lactobacillus acidophilus (Probiotic) 10 billion cell cap Take 1 Tab by mouth daily. Yes Provider, Historical     ALLERGIES:    Allergies   Allergen Reactions    Pcn [Penicillins] Unknown (comments)       Review of Systems:    See prior consult, office note or scanned list in media section.   10 systems negative except as specified.                 Signed By: Bindu Goode MD     June 17, 2021

## 2021-07-12 ENCOUNTER — TRANSCRIBE ORDER (OUTPATIENT)
Dept: REGISTRATION | Age: 41
End: 2021-07-12

## 2021-07-12 ENCOUNTER — HOSPITAL ENCOUNTER (OUTPATIENT)
Dept: GENERAL RADIOLOGY | Age: 41
Discharge: HOME | End: 2021-07-12
Payer: SUBSIDIZED

## 2021-07-12 DIAGNOSIS — M25.561 RIGHT KNEE PAIN: Primary | ICD-10-CM

## 2021-07-12 DIAGNOSIS — M25.561 RIGHT KNEE PAIN: ICD-10-CM

## 2021-07-12 PROCEDURE — 73562 X-RAY EXAM OF KNEE 3: CPT

## 2021-07-23 ENCOUNTER — OFFICE VISIT (OUTPATIENT)
Dept: SURGERY | Age: 41
End: 2021-07-23
Payer: SUBSIDIZED

## 2021-07-23 VITALS
DIASTOLIC BLOOD PRESSURE: 65 MMHG | SYSTOLIC BLOOD PRESSURE: 109 MMHG | TEMPERATURE: 97.5 F | HEART RATE: 73 BPM | BODY MASS INDEX: 30.81 KG/M2 | OXYGEN SATURATION: 99 % | WEIGHT: 196.7 LBS

## 2021-07-23 DIAGNOSIS — Z09 POSTOPERATIVE EXAMINATION: Primary | ICD-10-CM

## 2021-07-23 PROCEDURE — 99024 POSTOP FOLLOW-UP VISIT: CPT | Performed by: SURGERY

## 2021-07-23 NOTE — PROGRESS NOTES
Identified pt with two pt identifiers(name and ). Reviewed record in preparation for visit and have obtained necessary documentation. All patient medications has been reviewed. Chief Complaint   Patient presents with    Surgical Follow-up     1mth s/p tiffanie phillips on 21       Health Maintenance Due   Topic    DTaP/Tdap/Td series (1 - Tdap)    PAP AKA CERVICAL CYTOLOGY        Vitals:    21 1040   Weight: 89.2 kg (196 lb 11.2 oz)   PainSc:   0 - No pain       4. Have you been to the ER, urgent care clinic since your last visit? Hospitalized since your last visit? No    5. Have you seen or consulted any other health care providers outside of the [address] [place] [address] since your last visit? Include any pap smears or colon screening. No      Patient is accompanied by spouse I have received verbal consent from Hernandez De La Torre to discuss any/all medical information while they are present in the room.

## 2021-07-23 NOTE — Clinical Note
7/23/2021    Patient: Anabelle Barksdale   YOB: 1980   Date of Visit: 7/23/2021     Arnulfo Gibson MD  [address] [facility]  [address] [place] [address]  Via In [address] [place], [place] [address]  [address]  [facility]  V[address] [facility] [address]    Dear MD Cayla Cope MD,      Thank you for referring Ms. Anabelle Barksdale to  [facility] for evaluation. My notes for this consultation are attached. If you have questions, please do not hesitate to call me. I look forward to following your patient along with you.       Sincerely,    Paz Galindo MD

## 2022-03-19 PROBLEM — K83.1 OBSTRUCTIVE JAUNDICE: Status: ACTIVE | Noted: 2021-01-22

## 2022-03-20 PROBLEM — K82.8 SLUDGE IN GALLBLADDER: Status: ACTIVE | Noted: 2021-06-16

## 2022-05-04 ENCOUNTER — HOSPITAL ENCOUNTER (OUTPATIENT)
Dept: LAB | Age: 42
Discharge: HOME | End: 2022-05-04

## 2022-05-04 PROCEDURE — 84439 ASSAY OF FREE THYROXINE: CPT

## 2022-05-04 PROCEDURE — 85025 COMPLETE CBC W/AUTO DIFF WBC: CPT

## 2022-05-04 PROCEDURE — 80061 LIPID PANEL: CPT

## 2022-05-04 PROCEDURE — 80053 COMPREHEN METABOLIC PANEL: CPT

## 2022-05-04 PROCEDURE — 84443 ASSAY THYROID STIM HORMONE: CPT

## 2022-05-05 ENCOUNTER — HOSPITAL ENCOUNTER (OUTPATIENT)
Dept: LAB | Age: 42
Discharge: HOME | End: 2022-05-05

## 2022-05-05 LAB
ALBUMIN SERPL-MCNC: 3.8 G/DL (ref 3.5–5)
ALBUMIN/GLOB SERPL: 1.1 {RATIO} (ref 1.1–2.2)
ALP SERPL-CCNC: 74 U/L (ref 45–117)
ALT SERPL-CCNC: 21 U/L (ref 12–78)
ANION GAP SERPL CALC-SCNC: 6 MMOL/L (ref 5–15)
AST SERPL-CCNC: 15 U/L (ref 15–37)
BASOPHILS # BLD: 0 K/UL (ref 0–0.1)
BASOPHILS NFR BLD: 1 % (ref 0–1)
BILIRUB SERPL-MCNC: 0.6 MG/DL (ref 0.2–1)
BUN SERPL-MCNC: 10 MG/DL (ref 6–20)
BUN/CREAT SERPL: 18 (ref 12–20)
CALCIUM SERPL-MCNC: 8.7 MG/DL (ref 8.5–10.1)
CHLORIDE SERPL-SCNC: 106 MMOL/L (ref 97–108)
CHOLEST SERPL-MCNC: 193 MG/DL
CO2 SERPL-SCNC: 30 MMOL/L (ref 21–32)
CREAT SERPL-MCNC: 0.57 MG/DL (ref 0.55–1.02)
DIFFERENTIAL METHOD BLD: ABNORMAL
EOSINOPHIL # BLD: 0.1 K/UL (ref 0–0.4)
EOSINOPHIL NFR BLD: 1 % (ref 0–7)
ERYTHROCYTE [DISTWIDTH] IN BLOOD BY AUTOMATED COUNT: 15.2 % (ref 11.5–14.5)
GLOBULIN SER CALC-MCNC: 3.6 G/DL (ref 2–4)
GLUCOSE SERPL-MCNC: 97 MG/DL (ref 65–100)
HCT VFR BLD AUTO: 40.2 % (ref 35–47)
HDLC SERPL-MCNC: 78 MG/DL
HDLC SERPL: 2.5 {RATIO} (ref 0–5)
HGB BLD-MCNC: 12 G/DL (ref 11.5–16)
IMM GRANULOCYTES # BLD AUTO: 0 K/UL (ref 0–0.04)
IMM GRANULOCYTES NFR BLD AUTO: 0 % (ref 0–0.5)
LDLC SERPL CALC-MCNC: 98.4 MG/DL (ref 0–100)
LYMPHOCYTES # BLD: 1.6 K/UL (ref 0.8–3.5)
LYMPHOCYTES NFR BLD: 25 % (ref 12–49)
MCH RBC QN AUTO: 27.3 PG (ref 26–34)
MCHC RBC AUTO-ENTMCNC: 29.9 G/DL (ref 30–36.5)
MCV RBC AUTO: 91.6 FL (ref 80–99)
MONOCYTES # BLD: 0.6 K/UL (ref 0–1)
MONOCYTES NFR BLD: 9 % (ref 5–13)
NEUTS SEG # BLD: 4.1 K/UL (ref 1.8–8)
NEUTS SEG NFR BLD: 64 % (ref 32–75)
NRBC # BLD: 0 K/UL (ref 0–0.01)
NRBC BLD-RTO: 0 PER 100 WBC
PLATELET # BLD AUTO: 272 K/UL (ref 150–400)
PMV BLD AUTO: 11.5 FL (ref 8.9–12.9)
POTASSIUM SERPL-SCNC: 4.2 MMOL/L (ref 3.5–5.1)
PROT SERPL-MCNC: 7.4 G/DL (ref 6.4–8.2)
RBC # BLD AUTO: 4.39 M/UL (ref 3.8–5.2)
SODIUM SERPL-SCNC: 142 MMOL/L (ref 136–145)
T4 FREE SERPL-MCNC: 0.9 NG/DL (ref 0.8–1.5)
TRIGL SERPL-MCNC: 83 MG/DL (ref ?–150)
TSH SERPL DL<=0.05 MIU/L-ACNC: 1.56 UIU/ML (ref 0.36–3.74)
VLDLC SERPL CALC-MCNC: 16.6 MG/DL
WBC # BLD AUTO: 6.4 K/UL (ref 3.6–11)

## 2022-05-05 PROCEDURE — 88175 CYTOPATH C/V AUTO FLUID REDO: CPT

## 2022-05-05 PROCEDURE — 87624 HPV HI-RISK TYP POOLED RSLT: CPT

## 2022-05-10 ENCOUNTER — TRANSCRIBE ORDER (OUTPATIENT)
Dept: SCHEDULING | Age: 42
End: 2022-05-10

## 2022-05-10 DIAGNOSIS — Z12.31 SCREENING MAMMOGRAM FOR HIGH-RISK PATIENT: Primary | ICD-10-CM

## 2022-07-05 ENCOUNTER — TRANSCRIBE ORDER (OUTPATIENT)
Dept: SCHEDULING | Age: 42
End: 2022-07-05

## 2022-07-05 DIAGNOSIS — Z12.31 VISIT FOR SCREENING MAMMOGRAM: Primary | ICD-10-CM

## 2022-07-07 ENCOUNTER — HOSPITAL ENCOUNTER (OUTPATIENT)
Dept: MAMMOGRAPHY | Age: 42
Discharge: HOME | End: 2022-07-07
Attending: FAMILY MEDICINE
Payer: SUBSIDIZED

## 2022-07-07 DIAGNOSIS — Z12.31 SCREENING MAMMOGRAM FOR HIGH-RISK PATIENT: ICD-10-CM

## 2022-07-07 PROCEDURE — 77063 BREAST TOMOSYNTHESIS BI: CPT

## 2023-12-22 PROCEDURE — 83690 ASSAY OF LIPASE: CPT

## 2023-12-22 PROCEDURE — 84443 ASSAY THYROID STIM HORMONE: CPT

## 2023-12-22 PROCEDURE — 36415 COLL VENOUS BLD VENIPUNCTURE: CPT

## 2023-12-22 PROCEDURE — 85025 COMPLETE CBC W/AUTO DIFF WBC: CPT

## 2023-12-22 PROCEDURE — 80053 COMPREHEN METABOLIC PANEL: CPT

## 2023-12-23 ENCOUNTER — HOSPITAL ENCOUNTER (OUTPATIENT)
Facility: HOSPITAL | Age: 43
Setting detail: SPECIMEN
Discharge: HOME | End: 2023-12-26

## 2023-12-23 LAB
ALBUMIN SERPL-MCNC: 3.8 G/DL (ref 3.5–5)
ALBUMIN/GLOB SERPL: 1 (ref 1.1–2.2)
ALP SERPL-CCNC: 74 U/L (ref 45–117)
ALT SERPL-CCNC: 25 U/L (ref 12–78)
ANION GAP SERPL CALC-SCNC: 7 MMOL/L (ref 5–15)
AST SERPL-CCNC: 13 U/L (ref 15–37)
BASOPHILS # BLD: 0 K/UL (ref 0–0.1)
BASOPHILS NFR BLD: 0 % (ref 0–1)
BILIRUB SERPL-MCNC: 0.4 MG/DL (ref 0.2–1)
BUN SERPL-MCNC: 11 MG/DL (ref 6–20)
BUN/CREAT SERPL: 18 (ref 12–20)
CALCIUM SERPL-MCNC: 8.9 MG/DL (ref 8.5–10.1)
CHLORIDE SERPL-SCNC: 106 MMOL/L (ref 97–108)
CO2 SERPL-SCNC: 26 MMOL/L (ref 21–32)
CREAT SERPL-MCNC: 0.61 MG/DL (ref 0.55–1.02)
DIFFERENTIAL METHOD BLD: ABNORMAL
EOSINOPHIL # BLD: 0.1 K/UL (ref 0–0.4)
EOSINOPHIL NFR BLD: 1 % (ref 0–7)
ERYTHROCYTE [DISTWIDTH] IN BLOOD BY AUTOMATED COUNT: 15 % (ref 11.5–14.5)
GLOBULIN SER CALC-MCNC: 4 G/DL (ref 2–4)
GLUCOSE SERPL-MCNC: 95 MG/DL (ref 65–100)
HCT VFR BLD AUTO: 39.2 % (ref 35–47)
HGB BLD-MCNC: 12.4 G/DL (ref 11.5–16)
IMM GRANULOCYTES # BLD AUTO: 0 K/UL (ref 0–0.04)
IMM GRANULOCYTES NFR BLD AUTO: 0 % (ref 0–0.5)
LIPASE SERPL-CCNC: 15 U/L (ref 13–75)
LYMPHOCYTES # BLD: 1.7 K/UL (ref 0.8–3.5)
LYMPHOCYTES NFR BLD: 26 % (ref 12–49)
MCH RBC QN AUTO: 25.9 PG (ref 26–34)
MCHC RBC AUTO-ENTMCNC: 31.6 G/DL (ref 30–36.5)
MCV RBC AUTO: 81.8 FL (ref 80–99)
MONOCYTES # BLD: 0.5 K/UL (ref 0–1)
MONOCYTES NFR BLD: 7 % (ref 5–13)
NEUTS SEG # BLD: 4.3 K/UL (ref 1.8–8)
NEUTS SEG NFR BLD: 66 % (ref 32–75)
NRBC # BLD: 0 K/UL (ref 0–0.01)
NRBC BLD-RTO: 0 PER 100 WBC
PLATELET # BLD AUTO: 289 K/UL (ref 150–400)
PMV BLD AUTO: 12 FL (ref 8.9–12.9)
POTASSIUM SERPL-SCNC: 4.2 MMOL/L (ref 3.5–5.1)
PROT SERPL-MCNC: 7.8 G/DL (ref 6.4–8.2)
RBC # BLD AUTO: 4.79 M/UL (ref 3.8–5.2)
SODIUM SERPL-SCNC: 139 MMOL/L (ref 136–145)
TSH SERPL DL<=0.05 MIU/L-ACNC: 1.62 UIU/ML (ref 0.36–3.74)
WBC # BLD AUTO: 6.6 K/UL (ref 3.6–11)

## 2024-10-21 ENCOUNTER — APPOINTMENT (OUTPATIENT)
Facility: HOSPITAL | Age: 44
End: 2024-10-21

## 2024-10-21 ENCOUNTER — HOSPITAL ENCOUNTER (EMERGENCY)
Facility: HOSPITAL | Age: 44
Discharge: HOME | End: 2024-10-21
Attending: STUDENT IN AN ORGANIZED HEALTH CARE EDUCATION/TRAINING PROGRAM

## 2024-10-21 VITALS
OXYGEN SATURATION: 97 % | HEART RATE: 86 BPM | BODY MASS INDEX: 36.02 KG/M2 | RESPIRATION RATE: 18 BRPM | TEMPERATURE: 98.1 F | WEIGHT: 229.5 LBS | HEIGHT: 67 IN | SYSTOLIC BLOOD PRESSURE: 119 MMHG | DIASTOLIC BLOOD PRESSURE: 77 MMHG

## 2024-10-21 DIAGNOSIS — M54.6 ACUTE LEFT-SIDED THORACIC BACK PAIN: ICD-10-CM

## 2024-10-21 DIAGNOSIS — R07.9 CHEST PAIN, UNSPECIFIED TYPE: Primary | ICD-10-CM

## 2024-10-21 LAB
ALBUMIN SERPL-MCNC: 3.7 G/DL (ref 3.5–5)
ALBUMIN/GLOB SERPL: 0.9 (ref 1.1–2.2)
ALP SERPL-CCNC: 78 U/L (ref 45–117)
ALT SERPL-CCNC: 28 U/L (ref 12–78)
ANION GAP SERPL CALC-SCNC: 3 MMOL/L (ref 2–12)
APPEARANCE UR: CLEAR
AST SERPL-CCNC: 23 U/L (ref 15–37)
BACTERIA URNS QL MICRO: NEGATIVE /HPF
BASOPHILS # BLD: 0 K/UL (ref 0–0.1)
BASOPHILS NFR BLD: 0 % (ref 0–1)
BILIRUB SERPL-MCNC: 0.4 MG/DL (ref 0.2–1)
BILIRUB UR QL: NEGATIVE
BUN SERPL-MCNC: 15 MG/DL (ref 6–20)
BUN/CREAT SERPL: 24 (ref 12–20)
CALCIUM SERPL-MCNC: 9 MG/DL (ref 8.5–10.1)
CHLORIDE SERPL-SCNC: 106 MMOL/L (ref 97–108)
CO2 SERPL-SCNC: 28 MMOL/L (ref 21–32)
COLOR UR: ABNORMAL
COMMENT:: NORMAL
CREAT SERPL-MCNC: 0.63 MG/DL (ref 0.55–1.02)
D DIMER PPP FEU-MCNC: 0.5 MG/L FEU (ref 0–0.65)
DIFFERENTIAL METHOD BLD: ABNORMAL
EKG ATRIAL RATE: 88 BPM
EKG DIAGNOSIS: NORMAL
EKG P AXIS: 58 DEGREES
EKG P-R INTERVAL: 124 MS
EKG Q-T INTERVAL: 350 MS
EKG QRS DURATION: 84 MS
EKG QTC CALCULATION (BAZETT): 423 MS
EKG R AXIS: 57 DEGREES
EKG T AXIS: 22 DEGREES
EKG VENTRICULAR RATE: 88 BPM
EOSINOPHIL # BLD: 0.1 K/UL (ref 0–0.4)
EOSINOPHIL NFR BLD: 2 % (ref 0–7)
EPITH CASTS URNS QL MICRO: ABNORMAL /LPF
ERYTHROCYTE [DISTWIDTH] IN BLOOD BY AUTOMATED COUNT: 15.1 % (ref 11.5–14.5)
GLOBULIN SER CALC-MCNC: 3.9 G/DL (ref 2–4)
GLUCOSE SERPL-MCNC: 100 MG/DL (ref 65–100)
GLUCOSE UR STRIP.AUTO-MCNC: NEGATIVE MG/DL
HCG UR QL: NEGATIVE
HCT VFR BLD AUTO: 38.8 % (ref 35–47)
HGB BLD-MCNC: 12.5 G/DL (ref 11.5–16)
HGB UR QL STRIP: NEGATIVE
IMM GRANULOCYTES # BLD AUTO: 0 K/UL (ref 0–0.04)
IMM GRANULOCYTES NFR BLD AUTO: 0 % (ref 0–0.5)
KETONES UR QL STRIP.AUTO: NEGATIVE MG/DL
LEUKOCYTE ESTERASE UR QL STRIP.AUTO: ABNORMAL
LIPASE SERPL-CCNC: 25 U/L (ref 13–75)
LYMPHOCYTES # BLD: 1.6 K/UL (ref 0.8–3.5)
LYMPHOCYTES NFR BLD: 23 % (ref 12–49)
MCH RBC QN AUTO: 26.7 PG (ref 26–34)
MCHC RBC AUTO-ENTMCNC: 32.2 G/DL (ref 30–36.5)
MCV RBC AUTO: 82.9 FL (ref 80–99)
MONOCYTES # BLD: 0.7 K/UL (ref 0–1)
MONOCYTES NFR BLD: 10 % (ref 5–13)
NEUTS SEG # BLD: 4.5 K/UL (ref 1.8–8)
NEUTS SEG NFR BLD: 65 % (ref 32–75)
NITRITE UR QL STRIP.AUTO: NEGATIVE
NRBC # BLD: 0 K/UL (ref 0–0.01)
NRBC BLD-RTO: 0 PER 100 WBC
NT PRO BNP: 31 PG/ML
PH UR STRIP: 7 (ref 5–8)
PLATELET # BLD AUTO: 265 K/UL (ref 150–400)
PMV BLD AUTO: 11.3 FL (ref 8.9–12.9)
POTASSIUM SERPL-SCNC: 3.8 MMOL/L (ref 3.5–5.1)
PROT SERPL-MCNC: 7.6 G/DL (ref 6.4–8.2)
PROT UR STRIP-MCNC: NEGATIVE MG/DL
RBC # BLD AUTO: 4.68 M/UL (ref 3.8–5.2)
RBC #/AREA URNS HPF: ABNORMAL /HPF (ref 0–5)
SODIUM SERPL-SCNC: 137 MMOL/L (ref 136–145)
SP GR UR REFRACTOMETRY: 1.01 (ref 1–1.03)
SPECIMEN HOLD: NORMAL
SPECIMEN HOLD: NORMAL
TROPONIN I SERPL HS-MCNC: <4 NG/L (ref 0–51)
UROBILINOGEN UR QL STRIP.AUTO: 0.2 EU/DL (ref 0.2–1)
WBC # BLD AUTO: 6.9 K/UL (ref 3.6–11)
WBC URNS QL MICRO: ABNORMAL /HPF (ref 0–4)

## 2024-10-21 PROCEDURE — 96374 THER/PROPH/DIAG INJ IV PUSH: CPT

## 2024-10-21 PROCEDURE — 99285 EMERGENCY DEPT VISIT HI MDM: CPT

## 2024-10-21 PROCEDURE — 85379 FIBRIN DEGRADATION QUANT: CPT

## 2024-10-21 PROCEDURE — 80053 COMPREHEN METABOLIC PANEL: CPT

## 2024-10-21 PROCEDURE — 74177 CT ABD & PELVIS W/CONTRAST: CPT

## 2024-10-21 PROCEDURE — 81025 URINE PREGNANCY TEST: CPT

## 2024-10-21 PROCEDURE — 93010 ELECTROCARDIOGRAM REPORT: CPT | Performed by: INTERNAL MEDICINE

## 2024-10-21 PROCEDURE — 81001 URINALYSIS AUTO W/SCOPE: CPT

## 2024-10-21 PROCEDURE — 71046 X-RAY EXAM CHEST 2 VIEWS: CPT

## 2024-10-21 PROCEDURE — 36415 COLL VENOUS BLD VENIPUNCTURE: CPT

## 2024-10-21 PROCEDURE — 83880 ASSAY OF NATRIURETIC PEPTIDE: CPT

## 2024-10-21 PROCEDURE — 85025 COMPLETE CBC W/AUTO DIFF WBC: CPT

## 2024-10-21 PROCEDURE — 6360000004 HC RX CONTRAST MEDICATION: Performed by: RADIOLOGY

## 2024-10-21 PROCEDURE — 93005 ELECTROCARDIOGRAM TRACING: CPT | Performed by: STUDENT IN AN ORGANIZED HEALTH CARE EDUCATION/TRAINING PROGRAM

## 2024-10-21 PROCEDURE — 6360000002 HC RX W HCPCS

## 2024-10-21 PROCEDURE — 83690 ASSAY OF LIPASE: CPT

## 2024-10-21 PROCEDURE — 84484 ASSAY OF TROPONIN QUANT: CPT

## 2024-10-21 RX ORDER — KETOROLAC TROMETHAMINE 30 MG/ML
15 INJECTION, SOLUTION INTRAMUSCULAR; INTRAVENOUS
Status: COMPLETED | OUTPATIENT
Start: 2024-10-21 | End: 2024-10-21

## 2024-10-21 RX ORDER — IOPAMIDOL 755 MG/ML
100 INJECTION, SOLUTION INTRAVASCULAR
Status: COMPLETED | OUTPATIENT
Start: 2024-10-21 | End: 2024-10-21

## 2024-10-21 RX ORDER — LIDOCAINE 50 MG/G
1 PATCH TOPICAL DAILY
Qty: 7 PATCH | Refills: 0 | Status: SHIPPED | OUTPATIENT
Start: 2024-10-21 | End: 2024-10-28

## 2024-10-21 RX ADMIN — KETOROLAC TROMETHAMINE 15 MG: 30 INJECTION, SOLUTION INTRAMUSCULAR at 12:05

## 2024-10-21 RX ADMIN — IOPAMIDOL 100 ML: 755 INJECTION, SOLUTION INTRAVENOUS at 12:28

## 2024-10-21 ASSESSMENT — PAIN DESCRIPTION - PAIN TYPE: TYPE: ACUTE PAIN

## 2024-10-21 ASSESSMENT — PAIN DESCRIPTION - DIRECTION: RADIATING_TOWARDS: BACK

## 2024-10-21 ASSESSMENT — PAIN DESCRIPTION - LOCATION
LOCATION: CHEST;BACK
LOCATION: CHEST

## 2024-10-21 ASSESSMENT — PAIN DESCRIPTION - ORIENTATION
ORIENTATION: LEFT
ORIENTATION: MID;UPPER

## 2024-10-21 ASSESSMENT — PAIN - FUNCTIONAL ASSESSMENT
PAIN_FUNCTIONAL_ASSESSMENT: ACTIVITIES ARE NOT PREVENTED
PAIN_FUNCTIONAL_ASSESSMENT: ACTIVITIES ARE NOT PREVENTED
PAIN_FUNCTIONAL_ASSESSMENT: 0-10

## 2024-10-21 ASSESSMENT — PAIN DESCRIPTION - DESCRIPTORS
DESCRIPTORS: TINGLING;BURNING
DESCRIPTORS: ACHING

## 2024-10-21 ASSESSMENT — PAIN SCALES - GENERAL
PAINLEVEL_OUTOF10: 8
PAINLEVEL_OUTOF10: 7

## 2024-10-21 ASSESSMENT — HEART SCORE: ECG: NORMAL

## 2024-10-21 ASSESSMENT — PAIN DESCRIPTION - ONSET: ONSET: ON-GOING

## 2024-10-21 ASSESSMENT — PAIN DESCRIPTION - FREQUENCY: FREQUENCY: CONTINUOUS

## 2024-10-21 NOTE — ED PROVIDER NOTES
time.    Back pain most consistent with musculoskeletal spasm/strain.  It is reproducible on exam, with tenderness over the left paraspinal muscles in the mid back.  No midline tenderness.  No back pain red flags on history or physical.  Presentation not consistent with malignancy given no history of malignancy and lack of B symptoms.  Low suspicion for fracture given no history of trauma and no bony tenderness on exam.  Considered but doubt transverse myelitis given no sensory loss and no distal weakness.  Low suspicion for thoracic aortic dissection as patient has equal peripheral pulses.  Low suspicion for osteomyelitis or epidural abscess given no history of IV drug use and no vertebral tenderness.  Given the clinical picture, no indication for imaging at this time.    Patient is stable for discharge home at this time.  Recommend that she continue ibuprofen/Tylenol as needed.  Will provide with a prescription for lidocaine patches to use as needed.  Return precautions discussed with patient who expresses understanding and is in agreement with the current plan.  Recommend follow-up with PCP.    Amount and/or Complexity of Data Reviewed  Labs: ordered.  Radiology: ordered.  ECG/medicine tests: ordered.    Risk  Prescription drug management.            FINAL IMPRESSION      1. Chest pain, unspecified type    2. Acute left-sided thoracic back pain          DISPOSITION/PLAN   DISPOSITION Decision To Discharge 10/21/2024 01:16:42 PM      PATIENT REFERRED TO:  Isidoro Hernández MD  [address] [facility]  [address]  [facility] [address] [phone]    In 2 days      [facility] EMERGENCY DEP  [address] [place] [address] [phone]    As needed, If symptoms worsen      DISCHARGE MEDICATIONS:  Discharge Medication List as of 10/21/2024  1:18 PM        START taking these medications    Details   lidocaine (LIDODERM) 5 % Place 1 patch onto the skin daily for 7 days 12 hours on, 12 hours off., Disp-7 patch, R-0Normal

## 2024-10-21 NOTE — ED TRIAGE NOTES
Pain under left breast radiates to back and in chest. Symptoms for 5 days. Michael N/V/D. Stevensnoies SOB.

## 2025-07-23 ENCOUNTER — TRANSCRIBE ORDERS (OUTPATIENT)
Facility: HOSPITAL | Age: 45
End: 2025-07-23

## 2025-07-23 DIAGNOSIS — Z12.31 ENCOUNTER FOR SCREENING MAMMOGRAM FOR BREAST CANCER: Primary | ICD-10-CM

## 2025-07-31 PROCEDURE — 83036 HEMOGLOBIN GLYCOSYLATED A1C: CPT

## 2025-07-31 PROCEDURE — 84443 ASSAY THYROID STIM HORMONE: CPT

## 2025-07-31 PROCEDURE — 80053 COMPREHEN METABOLIC PANEL: CPT

## 2025-07-31 PROCEDURE — 85025 COMPLETE CBC W/AUTO DIFF WBC: CPT

## 2025-07-31 PROCEDURE — 80061 LIPID PANEL: CPT

## 2025-08-01 ENCOUNTER — HOSPITAL ENCOUNTER (OUTPATIENT)
Facility: HOSPITAL | Age: 45
Setting detail: SPECIMEN
Discharge: HOME | End: 2025-08-04

## 2025-08-01 ENCOUNTER — HOSPITAL ENCOUNTER (OUTPATIENT)
Facility: HOSPITAL | Age: 45
Discharge: HOME | End: 2025-08-01
Attending: INTERNAL MEDICINE

## 2025-08-01 VITALS — WEIGHT: 228 LBS | BODY MASS INDEX: 35.71 KG/M2

## 2025-08-01 DIAGNOSIS — Z12.31 ENCOUNTER FOR SCREENING MAMMOGRAM FOR BREAST CANCER: ICD-10-CM

## 2025-08-01 LAB
ALBUMIN SERPL-MCNC: 3.9 G/DL (ref 3.5–5.2)
ALBUMIN/GLOB SERPL: 1.2 (ref 1.1–2.2)
ALP SERPL-CCNC: 68 U/L (ref 35–104)
ALT SERPL-CCNC: 22 U/L (ref 10–35)
ANION GAP SERPL CALC-SCNC: 11 MMOL/L (ref 2–14)
AST SERPL-CCNC: 21 U/L (ref 10–35)
BASOPHILS # BLD: 0.03 K/UL (ref 0–0.1)
BASOPHILS NFR BLD: 0.4 % (ref 0–1)
BILIRUB SERPL-MCNC: 0.4 MG/DL (ref 0–1.2)
BUN SERPL-MCNC: 11 MG/DL (ref 6–20)
BUN/CREAT SERPL: 19 (ref 12–20)
CALCIUM SERPL-MCNC: 9.1 MG/DL (ref 8.6–10)
CHLORIDE SERPL-SCNC: 103 MMOL/L (ref 98–107)
CHOLEST SERPL-MCNC: 192 MG/DL (ref 0–200)
CO2 SERPL-SCNC: 23 MMOL/L (ref 20–29)
CREAT SERPL-MCNC: 0.57 MG/DL (ref 0.6–1)
DIFFERENTIAL METHOD BLD: ABNORMAL
EOSINOPHIL # BLD: 0.11 K/UL (ref 0–0.4)
EOSINOPHIL NFR BLD: 1.5 % (ref 0–7)
ERYTHROCYTE [DISTWIDTH] IN BLOOD BY AUTOMATED COUNT: 14.6 % (ref 11.5–14.5)
EST. AVERAGE GLUCOSE BLD GHB EST-MCNC: 131 MG/DL
GLOBULIN SER CALC-MCNC: 3.1 G/DL (ref 2–4)
GLUCOSE SERPL-MCNC: 105 MG/DL (ref 65–100)
HBA1C MFR BLD: 6.2 % (ref 4–5.6)
HCT VFR BLD AUTO: 38.1 % (ref 35–47)
HDLC SERPL-MCNC: 71 MG/DL (ref 40–60)
HDLC SERPL: 2.7
HGB BLD-MCNC: 12.3 G/DL (ref 11.5–16)
IMM GRANULOCYTES # BLD AUTO: 0.01 K/UL (ref 0–0.04)
IMM GRANULOCYTES NFR BLD AUTO: 0.1 % (ref 0–0.5)
LDLC SERPL CALC-MCNC: 99 MG/DL
LYMPHOCYTES # BLD: 1.95 K/UL (ref 0.8–3.5)
LYMPHOCYTES NFR BLD: 27.3 % (ref 12–49)
MCH RBC QN AUTO: 26.7 PG (ref 26–34)
MCHC RBC AUTO-ENTMCNC: 32.3 G/DL (ref 30–36.5)
MCV RBC AUTO: 82.8 FL (ref 80–99)
MONOCYTES # BLD: 0.64 K/UL (ref 0–1)
MONOCYTES NFR BLD: 9 % (ref 5–13)
NEUTS SEG # BLD: 4.41 K/UL (ref 1.8–8)
NEUTS SEG NFR BLD: 61.7 % (ref 32–75)
NRBC # BLD: 0 K/UL (ref 0–0.01)
NRBC BLD-RTO: 0 PER 100 WBC
PLATELET # BLD AUTO: 278 K/UL (ref 150–400)
PMV BLD AUTO: 11.7 FL (ref 8.9–12.9)
POTASSIUM SERPL-SCNC: 4.4 MMOL/L (ref 3.5–5.1)
PROT SERPL-MCNC: 7 G/DL (ref 6.4–8.3)
RBC # BLD AUTO: 4.6 M/UL (ref 3.8–5.2)
SODIUM SERPL-SCNC: 137 MMOL/L (ref 136–145)
TRIGL SERPL-MCNC: 108 MG/DL (ref 0–150)
TSH, 3RD GENERATION: 1.86 UIU/ML (ref 0.27–4.2)
VLDLC SERPL CALC-MCNC: 22 MG/DL
WBC # BLD AUTO: 7.2 K/UL (ref 3.6–11)

## 2025-08-01 PROCEDURE — 77063 BREAST TOMOSYNTHESIS BI: CPT

## (undated) DEVICE — C-ARM: Brand: UNBRANDED

## (undated) DEVICE — LAPAROSCOPIC TROCAR SLEEVE/SINGLE USE: Brand: KII® OPTICAL ACCESS SYSTEM

## (undated) DEVICE — INFECTION CONTROL KIT SYS

## (undated) DEVICE — Device

## (undated) DEVICE — SUTURE MCRYL SZ 4-0 L27IN ABSRB UD L19MM PS-2 1/2 CIR PRIM Y426H

## (undated) DEVICE — ELECTRODE ES 36CM LAP FLAT L HK COAT DISP CLEANCOAT

## (undated) DEVICE — Device: Brand: ENDO SMARTCAP

## (undated) DEVICE — TISSUE RETRIEVAL SYSTEM: Brand: INZII RETRIEVAL SYSTEM

## (undated) DEVICE — STRAP,POSITIONING,KNEE/BODY,FOAM,4X60": Brand: MEDLINE

## (undated) DEVICE — PREP SKN CHLRAPRP APL 26ML STR --

## (undated) DEVICE — BALLOON DILATATION CATHETER: Brand: HURRICANE™ RX

## (undated) DEVICE — DECANTER BAG 9": Brand: MEDLINE INDUSTRIES, INC.

## (undated) DEVICE — DERMABOND SKIN ADH 0.7ML -- DERMABOND ADVANCED 12/BX

## (undated) DEVICE — FALCON® SAMPLE CONTAINER, WITH LID, 4.5OZ (110ML), INDIVIDUALLY WRAPPED, STERILE, 100/CASE: Brand: FALCON A CORNING BRAND

## (undated) DEVICE — TROCAR: Brand: KII® SLEEVE

## (undated) DEVICE — STERILE POLYISOPRENE POWDER-FREE SURGICAL GLOVES: Brand: PROTEXIS

## (undated) DEVICE — ENDOLOOP SUT LIGATURE 18IN -- 12/BX PDS II

## (undated) DEVICE — KIT CHOLGM POLYUR W/ KARLAN BLLN CATH 4FR L60CM 5MM INTRO

## (undated) DEVICE — KIT,1200CC CANISTER,3/16"X6' TUBING: Brand: MEDLINE INDUSTRIES, INC.

## (undated) DEVICE — SOL INJ SOD CL 0.9% 500ML BG --

## (undated) DEVICE — NDL PRT INJ NSAF BLNT 18GX1.5 --

## (undated) DEVICE — TROCAR: Brand: KII FIOS FIRST ENTRY

## (undated) DEVICE — NEEDLE HYPO 22GA L1.5IN BLK S STL HUB POLYPR SHLD REG BVL

## (undated) DEVICE — Z DISCONTINUED NO SUB IDED SET EXTN W/ 4 W STPCOCK M SPIN LOK 36IN

## (undated) DEVICE — INFLATION DEVICE: Brand: ENCORE 26

## (undated) DEVICE — APPLIER CLP M L L11.4IN DIA10MM ENDOSCP ROT MULT FOR LIG

## (undated) DEVICE — BITE BLK ENDOSCP AD 54FR GRN POLYETH ENDOSCP W STRP SLD

## (undated) DEVICE — REM POLYHESIVE ADULT PATIENT RETURN ELECTRODE: Brand: VALLEYLAB

## (undated) DEVICE — SURGICAL PROCEDURE KIT GEN LAPAROSCOPY LF

## (undated) DEVICE — CLICKLINE SCISSORS INSERT: Brand: CLICKLINE

## (undated) DEVICE — SUTURE SZ 0 27IN 5/8 CIR UR-6  TAPER PT VIOLET ABSRB VICRYL J603H

## (undated) DEVICE — RETRIEVAL BALLOON CATHETER: Brand: EXTRACTOR™ PRO RX

## (undated) DEVICE — SOL IRR SOD CL 0.9% 3000ML --

## (undated) DEVICE — GARMENT,MEDLINE,DVT,INT,CALF,MED, GEN2: Brand: MEDLINE

## (undated) DEVICE — GOWN,SIRUS,NONRNF,SETINSLV,2XL,18/CS: Brand: MEDLINE

## (undated) DEVICE — SYR 20ML LL STRL LF --